# Patient Record
Sex: FEMALE | Race: WHITE | Employment: OTHER | URBAN - METROPOLITAN AREA
[De-identification: names, ages, dates, MRNs, and addresses within clinical notes are randomized per-mention and may not be internally consistent; named-entity substitution may affect disease eponyms.]

---

## 2017-03-09 ENCOUNTER — HOSPITAL ENCOUNTER (OUTPATIENT)
Dept: CT IMAGING | Age: 72
Discharge: HOME | End: 2017-03-09
Attending: SURGERY
Payer: MEDICARE

## 2017-03-09 ENCOUNTER — HOSPITAL ENCOUNTER (OUTPATIENT)
Dept: CT IMAGING | Age: 72
End: 2017-03-09
Attending: SURGERY
Payer: MEDICARE

## 2017-03-09 VITALS — HEIGHT: 63 IN | WEIGHT: 163 LBS | BODY MASS INDEX: 28.88 KG/M2

## 2017-03-09 DIAGNOSIS — I65.23 BILATERAL CAROTID ARTERY STENOSIS: ICD-10-CM

## 2017-03-09 LAB — CREAT BLD-MCNC: 1 MG/DL (ref 0.6–1)

## 2017-03-09 PROCEDURE — 74011636320 HC RX REV CODE- 636/320: Performed by: SURGERY

## 2017-03-09 PROCEDURE — 82565 ASSAY OF CREATININE: CPT

## 2017-03-09 PROCEDURE — 74011000258 HC RX REV CODE- 258: Performed by: SURGERY

## 2017-03-09 PROCEDURE — 70498 CT ANGIOGRAPHY NECK: CPT

## 2017-03-09 RX ORDER — SODIUM CHLORIDE 0.9 % (FLUSH) 0.9 %
10 SYRINGE (ML) INJECTION
Status: COMPLETED | OUTPATIENT
Start: 2017-03-09 | End: 2017-03-09

## 2017-03-09 RX ADMIN — Medication 10 ML: at 10:33

## 2017-03-09 RX ADMIN — IOVERSOL 80 ML: 741 INJECTION INTRA-ARTERIAL; INTRAVENOUS at 10:33

## 2017-03-09 RX ADMIN — SODIUM CHLORIDE 100 ML: 900 INJECTION, SOLUTION INTRAVENOUS at 10:33

## 2017-03-21 PROBLEM — I25.10 CORONARY ATHEROSCLEROSIS OF NATIVE CORONARY VESSEL: Status: ACTIVE | Noted: 2017-03-21

## 2017-03-21 PROBLEM — R01.1 HEART MURMUR: Status: ACTIVE | Noted: 2017-03-21

## 2017-03-21 PROBLEM — D50.9 IRON DEFICIENCY ANEMIA: Status: ACTIVE | Noted: 2017-03-21

## 2017-03-21 PROBLEM — I65.29 CAROTID ARTERY STENOSIS WITHOUT CEREBRAL INFARCTION: Status: ACTIVE | Noted: 2017-03-21

## 2017-03-23 ENCOUNTER — ANESTHESIA EVENT (OUTPATIENT)
Dept: SURGERY | Age: 72
DRG: 039 | End: 2017-03-23
Payer: MEDICARE

## 2017-03-23 ENCOUNTER — HOSPITAL ENCOUNTER (OUTPATIENT)
Dept: SURGERY | Age: 72
Discharge: HOME | End: 2017-03-23
Payer: MEDICARE

## 2017-03-23 VITALS
OXYGEN SATURATION: 96 % | RESPIRATION RATE: 16 BRPM | HEART RATE: 81 BPM | WEIGHT: 167.7 LBS | HEIGHT: 63 IN | SYSTOLIC BLOOD PRESSURE: 183 MMHG | TEMPERATURE: 97.4 F | BODY MASS INDEX: 29.71 KG/M2 | DIASTOLIC BLOOD PRESSURE: 82 MMHG

## 2017-03-23 LAB
ANION GAP BLD CALC-SCNC: 8 MMOL/L (ref 7–16)
BUN SERPL-MCNC: 26 MG/DL (ref 8–23)
CALCIUM SERPL-MCNC: 9.6 MG/DL (ref 8.3–10.4)
CHLORIDE SERPL-SCNC: 104 MMOL/L (ref 98–107)
CO2 SERPL-SCNC: 28 MMOL/L (ref 21–32)
CREAT SERPL-MCNC: 1.23 MG/DL (ref 0.6–1)
ERYTHROCYTE [DISTWIDTH] IN BLOOD BY AUTOMATED COUNT: 15.2 % (ref 11.9–14.6)
GLUCOSE SERPL-MCNC: 183 MG/DL (ref 65–100)
HCT VFR BLD AUTO: 36 % (ref 35.8–46.3)
HGB BLD-MCNC: 12.2 G/DL (ref 11.7–15.4)
MCH RBC QN AUTO: 29 PG (ref 26.1–32.9)
MCHC RBC AUTO-ENTMCNC: 33.9 G/DL (ref 31.4–35)
MCV RBC AUTO: 85.7 FL (ref 79.6–97.8)
PLATELET # BLD AUTO: 221 K/UL (ref 150–450)
PMV BLD AUTO: 10.2 FL (ref 10.8–14.1)
POTASSIUM SERPL-SCNC: 4.4 MMOL/L (ref 3.5–5.1)
RBC # BLD AUTO: 4.2 M/UL (ref 4.05–5.25)
SODIUM SERPL-SCNC: 140 MMOL/L (ref 136–145)
WBC # BLD AUTO: 5.2 K/UL (ref 4.3–11.1)

## 2017-03-23 PROCEDURE — 80048 BASIC METABOLIC PNL TOTAL CA: CPT | Performed by: SURGERY

## 2017-03-23 PROCEDURE — 85027 COMPLETE CBC AUTOMATED: CPT | Performed by: SURGERY

## 2017-03-23 RX ORDER — ACETAMINOPHEN 325 MG/1
325 TABLET ORAL AS NEEDED
COMMUNITY

## 2017-03-23 NOTE — PERIOP NOTES
Patient verified name, , and surgery as listed in [facility]. TYPE  CASE:2            Orders: received. Labs per surgeon:  CBC/BMP. T/S Order signed and held for PREOP. Labs per anesthesia protocol: None needed   EKG  :  EKG ON CHART FROM 3/23/17     [facility] IN TO ASSESS PT AND REVIEW DOCS (CAROTIDS 17, CT HEAD 3/9/17, ECHO 10/1/14, EKG 14, CARDIAC CLEARANCE 3/23/17). NO ORDERS GIVEN OTHER THAN TO FOLLOW UP W/ ECHO DR SHELLEY IS ORDERING. DR SHELLEY ORDERED ECHO FOR PT TO HAVE DONE PRIOR TO SX. ECHO TIME AND DATE NOT YET KNOWN, BUT ORDER IS NOTED IN [facility]. Chart marked and placed in chart room for charge RN to follow up. BLOOD CONSENT ON CHART     Pt medications obtained  PT MEMORY, med bottles NOT visualized. Requested pt to validate each medication, dose and frequency while here today. Instructed patient to continue previous medications as prescribed prior to surgery and  to take the following medications the day of surgery according to anesthesia guidelines with a small sip of water : ASA 81MG, NEXIUM       Continue all previous medications unless otherwise directed. Instructed patient to hold  the following medications prior to surgery: ALL VITAMINS/SUPPLEMENTS     INSTRUCTED PT TO CONTINUE ASA 81MG DAILY    Patient instructed on the following and verbalized understanding:  Arrive at MAIN entrance, time of arrival to be called the day before by 1700. Responsible adult must drive patient to and from hospital, stay during surgery and 24 hours postoperatively. Npo after midnight including gum, mints and ice chips. Use hibiclens in the shower the night before and the morning of surgery. Leave all valuables at home. Instructed on no jewelry or body piercings on the dos. Bring insurance card and ID. No perfumes, oil, powder, colognes, makeup or  lotions on the skin.     Patient verbalized understanding of all instructions and provided all medical/health information to the best of their ability.

## 2017-03-23 NOTE — ANESTHESIA PREPROCEDURE EVALUATION
Anesthetic History     PONV          Review of Systems / Medical History  Patient summary reviewed and pertinent labs reviewed    Pulmonary  Within defined limits                 Neuro/Psych   Within defined limits           Cardiovascular    Hypertension: well controlled  Valvular problems/murmurs        PAD and hyperlipidemia  Pertinent negatives: CAD: denies. Exercise tolerance: >4 METS  Comments: Pt has heart murmur. Dr. Charito Javier has ordered an ECHO prior to surgery and will need review.     GI/Hepatic/Renal     GERD: well controlled           Endo/Other        Arthritis and anemia     Other Findings            Physical Exam    Airway  Mallampati: II  TM Distance: 4 - 6 cm  Neck ROM: normal range of motion   Mouth opening: Normal     Cardiovascular  Regular rate and rhythm,  S1 and S2 normal,  no murmur, click, rub, or gallop  Rhythm: regular  Rate: normal         Dental    Dentition: Full upper dentures and Full lower dentures     Pulmonary  Breath sounds clear to auscultation               Abdominal  GI exam deferred       Other Findings            Anesthetic Plan    ASA: 3  Anesthesia type: general    Monitoring Plan: Arterial line      Induction: Intravenous

## 2017-03-23 NOTE — PERIOP NOTES
Recent Results (from the past 12 hour(s))   CBC W/O DIFF    Collection Time: 03/23/17  2:15 PM   Result Value Ref Range    WBC 5.2 4.3 - 11.1 K/uL    RBC 4.20 4.05 - 5.25 M/uL    HGB 12.2 11.7 - 15.4 g/dL    HCT 36.0 35.8 - 46.3 %    MCV 85.7 79.6 - 97.8 FL    MCH 29.0 26.1 - 32.9 PG    MCHC 33.9 31.4 - 35.0 g/dL    RDW 15.2 (H) 11.9 - 14.6 %    PLATELET 353 666 - 117 K/uL    MPV 10.2 (L) 10.8 - 14.1 FL    BMP \"IN PROCESS\". Chart marked and placed in chart room for charge RN to follow up.

## 2017-03-24 ENCOUNTER — HOSPITAL ENCOUNTER (OUTPATIENT)
Dept: NON INVASIVE DIAGNOSTICS | Age: 72
Discharge: HOME | End: 2017-03-24
Attending: INTERNAL MEDICINE
Payer: MEDICARE

## 2017-03-24 DIAGNOSIS — I65.23 CAROTID ARTERY STENOSIS WITHOUT CEREBRAL INFARCTION, BILATERAL: ICD-10-CM

## 2017-03-24 DIAGNOSIS — R01.1 HEART MURMUR: ICD-10-CM

## 2017-03-24 PROCEDURE — 93306 TTE W/DOPPLER COMPLETE: CPT

## 2017-03-24 NOTE — PERIOP NOTES
Recent Results (from the past 24 hour(s))   CBC W/O DIFF    Collection Time: 03/23/17  2:15 PM   Result Value Ref Range    WBC 5.2 4.3 - 11.1 K/uL    RBC 4.20 4.05 - 5.25 M/uL    HGB 12.2 11.7 - 15.4 g/dL    HCT 36.0 35.8 - 46.3 %    MCV 85.7 79.6 - 97.8 FL    MCH 29.0 26.1 - 32.9 PG    MCHC 33.9 31.4 - 35.0 g/dL    RDW 15.2 (H) 11.9 - 14.6 %    PLATELET 336 482 - 309 K/uL    MPV 10.2 (L) 10.8 - 97.5 FL   METABOLIC PANEL, BASIC    Collection Time: 03/23/17  2:15 PM   Result Value Ref Range    Sodium 140 136 - 145 mmol/L    Potassium 4.4 3.5 - 5.1 mmol/L    Chloride 104 98 - 107 mmol/L    CO2 28 21 - 32 mmol/L    Anion gap 8 7 - 16 mmol/L    Glucose 183 (H) 65 - 100 mg/dL    BUN 26 (H) 8 - 23 MG/DL    Creatinine 1.23 (H) 0.6 - 1.0 MG/DL    GFR est AA 55 (L) >60 ml/min/1.73m2    GFR est non-AA 46 (L) >60 ml/min/1.73m2    Calcium 9.6 8.3 - 10.4 MG/DL   Patient is not diagnosed as diabetic

## 2017-03-27 NOTE — PROGRESS NOTES
Please call patient. Echo with normal LV function and mild aortic stenosis. Low risk for surgery. Will follow aortic valve in office.   Thank you

## 2017-03-30 ENCOUNTER — HOSPITAL ENCOUNTER (INPATIENT)
Age: 72
LOS: 2 days | Discharge: HOME | DRG: 039 | End: 2017-04-01
Attending: SURGERY | Admitting: SURGERY
Payer: MEDICARE

## 2017-03-30 ENCOUNTER — ANESTHESIA (OUTPATIENT)
Dept: SURGERY | Age: 72
DRG: 039 | End: 2017-03-30
Payer: MEDICARE

## 2017-03-30 PROBLEM — I65.22 CAROTID STENOSIS, LEFT: Status: ACTIVE | Noted: 2017-03-30

## 2017-03-30 LAB
ABO + RH BLD: NORMAL
ANION GAP BLD CALC-SCNC: 12 MMOL/L (ref 7–16)
ATRIAL RATE: 74 BPM
BLOOD GROUP ANTIBODIES SERPL: NORMAL
BUN SERPL-MCNC: 17 MG/DL (ref 8–23)
CALCIUM SERPL-MCNC: 8.8 MG/DL (ref 8.3–10.4)
CALCULATED P AXIS, ECG09: 57 DEGREES
CALCULATED R AXIS, ECG10: 44 DEGREES
CALCULATED T AXIS, ECG11: 99 DEGREES
CHLORIDE SERPL-SCNC: 105 MMOL/L (ref 98–107)
CO2 SERPL-SCNC: 22 MMOL/L (ref 21–32)
CREAT SERPL-MCNC: 0.98 MG/DL (ref 0.6–1)
DIAGNOSIS, 93000: NORMAL
ERYTHROCYTE [DISTWIDTH] IN BLOOD BY AUTOMATED COUNT: 14.7 % (ref 11.9–14.6)
GLUCOSE SERPL-MCNC: 176 MG/DL (ref 65–100)
HCT VFR BLD AUTO: 28.9 % (ref 35.8–46.3)
HGB BLD-MCNC: 9.8 G/DL (ref 11.7–15.4)
MAGNESIUM SERPL-MCNC: 1.6 MG/DL (ref 1.8–2.4)
MCH RBC QN AUTO: 28.4 PG (ref 26.1–32.9)
MCHC RBC AUTO-ENTMCNC: 33.9 G/DL (ref 31.4–35)
MCV RBC AUTO: 83.8 FL (ref 79.6–97.8)
P-R INTERVAL, ECG05: 194 MS
PLATELET # BLD AUTO: 154 K/UL (ref 150–450)
PMV BLD AUTO: 9.6 FL (ref 10.8–14.1)
POTASSIUM SERPL-SCNC: 4 MMOL/L (ref 3.5–5.1)
Q-T INTERVAL, ECG07: 376 MS
QRS DURATION, ECG06: 66 MS
QTC CALCULATION (BEZET), ECG08: 417 MS
RBC # BLD AUTO: 3.45 M/UL (ref 4.05–5.25)
SODIUM SERPL-SCNC: 139 MMOL/L (ref 136–145)
SPECIMEN EXP DATE BLD: NORMAL
VENTRICULAR RATE, ECG03: 74 BPM
WBC # BLD AUTO: 4.5 K/UL (ref 4.3–11.1)

## 2017-03-30 PROCEDURE — 77030013794 HC KT TRNSDUC BLD EDWD -B: Performed by: ANESTHESIOLOGY

## 2017-03-30 PROCEDURE — 3E033GC INTRODUCTION OF OTHER THERAPEUTIC SUBSTANCE INTO PERIPHERAL VEIN, PERCUTANEOUS APPROACH: ICD-10-PCS | Performed by: SURGERY

## 2017-03-30 PROCEDURE — 93005 ELECTROCARDIOGRAM TRACING: CPT | Performed by: SURGERY

## 2017-03-30 PROCEDURE — 77030020407 HC IV BLD WRMR ST 3M -A: Performed by: ANESTHESIOLOGY

## 2017-03-30 PROCEDURE — 74011250636 HC RX REV CODE- 250/636: Performed by: ANESTHESIOLOGY

## 2017-03-30 PROCEDURE — 74011000250 HC RX REV CODE- 250: Performed by: SURGERY

## 2017-03-30 PROCEDURE — 77030031139 HC SUT VCRL2 J&J -A: Performed by: SURGERY

## 2017-03-30 PROCEDURE — 77030002986 HC SUT PROL J&J -A: Performed by: SURGERY

## 2017-03-30 PROCEDURE — 80048 BASIC METABOLIC PNL TOTAL CA: CPT | Performed by: SURGERY

## 2017-03-30 PROCEDURE — 76010000173 HC OR TIME 3 TO 3.5 HR INTENSV-TIER 1: Performed by: SURGERY

## 2017-03-30 PROCEDURE — 76060000037 HC ANESTHESIA 3 TO 3.5 HR: Performed by: SURGERY

## 2017-03-30 PROCEDURE — 77030008477 HC STYL SATN SLP COVD -A: Performed by: ANESTHESIOLOGY

## 2017-03-30 PROCEDURE — 03CJ0ZZ EXTIRPATION OF MATTER FROM LEFT COMMON CAROTID ARTERY, OPEN APPROACH: ICD-10-PCS | Performed by: SURGERY

## 2017-03-30 PROCEDURE — C1768 GRAFT, VASCULAR: HCPCS | Performed by: SURGERY

## 2017-03-30 PROCEDURE — 77030002933 HC SUT MCRYL J&J -A: Performed by: SURGERY

## 2017-03-30 PROCEDURE — 77030014008 HC SPNG HEMSTAT J&J -C: Performed by: SURGERY

## 2017-03-30 PROCEDURE — 65610000006 HC RM INTENSIVE CARE

## 2017-03-30 PROCEDURE — 36600 WITHDRAWAL OF ARTERIAL BLOOD: CPT

## 2017-03-30 PROCEDURE — 77030010507 HC ADH SKN DERMBND J&J -B: Performed by: SURGERY

## 2017-03-30 PROCEDURE — 77030032490 HC SLV COMPR SCD KNE COVD -B: Performed by: SURGERY

## 2017-03-30 PROCEDURE — 86900 BLOOD TYPING SEROLOGIC ABO: CPT | Performed by: SURGERY

## 2017-03-30 PROCEDURE — 74011250636 HC RX REV CODE- 250/636: Performed by: SURGERY

## 2017-03-30 PROCEDURE — 77030002987 HC SUT PROL J&J -B: Performed by: SURGERY

## 2017-03-30 PROCEDURE — 77030018824 HC SHNT CAR ARGY COVD -B: Performed by: SURGERY

## 2017-03-30 PROCEDURE — 74011250637 HC RX REV CODE- 250/637: Performed by: SURGERY

## 2017-03-30 PROCEDURE — 77030012890

## 2017-03-30 PROCEDURE — 83735 ASSAY OF MAGNESIUM: CPT | Performed by: SURGERY

## 2017-03-30 PROCEDURE — 77030034888 HC SUT PROL 2 J&J -B: Performed by: SURGERY

## 2017-03-30 PROCEDURE — 77030019908 HC STETH ESOPH SIMS -A: Performed by: ANESTHESIOLOGY

## 2017-03-30 PROCEDURE — 03UN0KZ SUPPLEMENT LEFT EXTERNAL CAROTID ARTERY WITH NONAUTOLOGOUS TISSUE SUBSTITUTE, OPEN APPROACH: ICD-10-PCS | Performed by: SURGERY

## 2017-03-30 PROCEDURE — 85027 COMPLETE CBC AUTOMATED: CPT | Performed by: SURGERY

## 2017-03-30 PROCEDURE — 74011250637 HC RX REV CODE- 250/637: Performed by: ANESTHESIOLOGY

## 2017-03-30 PROCEDURE — 03CN0ZZ EXTIRPATION OF MATTER FROM LEFT EXTERNAL CAROTID ARTERY, OPEN APPROACH: ICD-10-PCS | Performed by: SURGERY

## 2017-03-30 PROCEDURE — 77030018836 HC SOL IRR NACL ICUM -A: Performed by: SURGERY

## 2017-03-30 PROCEDURE — 77030002996 HC SUT SLK J&J -A: Performed by: SURGERY

## 2017-03-30 PROCEDURE — 77030018673: Performed by: SURGERY

## 2017-03-30 PROCEDURE — 74011250636 HC RX REV CODE- 250/636

## 2017-03-30 PROCEDURE — 77030005401 HC CATH RAD ARRO -A: Performed by: ANESTHESIOLOGY

## 2017-03-30 PROCEDURE — 77030011640 HC PAD GRND REM COVD -A: Performed by: SURGERY

## 2017-03-30 PROCEDURE — 74011000250 HC RX REV CODE- 250

## 2017-03-30 PROCEDURE — 77030034850: Performed by: SURGERY

## 2017-03-30 PROCEDURE — 77030020782 HC GWN BAIR PAWS FLX 3M -B: Performed by: ANESTHESIOLOGY

## 2017-03-30 PROCEDURE — 77030013292 HC BOWL MX PRSM J&J -A: Performed by: ANESTHESIOLOGY

## 2017-03-30 PROCEDURE — 03UJ0KZ SUPPLEMENT LEFT COMMON CAROTID ARTERY WITH NONAUTOLOGOUS TISSUE SUBSTITUTE, OPEN APPROACH: ICD-10-PCS | Performed by: SURGERY

## 2017-03-30 PROCEDURE — 77030008703 HC TU ET UNCUF COVD -A: Performed by: ANESTHESIOLOGY

## 2017-03-30 PROCEDURE — 77030010514 HC APPL CLP LIG COVD -B: Performed by: SURGERY

## 2017-03-30 DEVICE — XENOSURE BIOLOGIC PATCH, 0.8CM X 8CM, EIFU
Type: IMPLANTABLE DEVICE | Site: CAROTID | Status: FUNCTIONAL
Brand: XENOSURE BIOLOGIC PATCH

## 2017-03-30 RX ORDER — SODIUM CHLORIDE 0.9 % (FLUSH) 0.9 %
5-10 SYRINGE (ML) INJECTION AS NEEDED
Status: DISCONTINUED | OUTPATIENT
Start: 2017-03-30 | End: 2017-04-01 | Stop reason: HOSPADM

## 2017-03-30 RX ORDER — DEXAMETHASONE SODIUM PHOSPHATE 4 MG/ML
INJECTION, SOLUTION INTRA-ARTICULAR; INTRALESIONAL; INTRAMUSCULAR; INTRAVENOUS; SOFT TISSUE AS NEEDED
Status: DISCONTINUED | OUTPATIENT
Start: 2017-03-30 | End: 2017-03-30 | Stop reason: HOSPADM

## 2017-03-30 RX ORDER — HYDROCODONE BITARTRATE AND ACETAMINOPHEN 7.5; 325 MG/1; MG/1
1 TABLET ORAL AS NEEDED
Status: CANCELLED | OUTPATIENT
Start: 2017-03-30

## 2017-03-30 RX ORDER — SODIUM CHLORIDE, SODIUM LACTATE, POTASSIUM CHLORIDE, CALCIUM CHLORIDE 600; 310; 30; 20 MG/100ML; MG/100ML; MG/100ML; MG/100ML
INJECTION, SOLUTION INTRAVENOUS
Status: DISCONTINUED | OUTPATIENT
Start: 2017-03-30 | End: 2017-03-30 | Stop reason: HOSPADM

## 2017-03-30 RX ORDER — CEFAZOLIN SODIUM IN 0.9 % NACL 2 G/50 ML
2 INTRAVENOUS SOLUTION, PIGGYBACK (ML) INTRAVENOUS EVERY 8 HOURS
Status: DISCONTINUED | OUTPATIENT
Start: 2017-03-30 | End: 2017-03-30

## 2017-03-30 RX ORDER — SODIUM CHLORIDE 0.9 % (FLUSH) 0.9 %
5-10 SYRINGE (ML) INJECTION EVERY 8 HOURS
Status: DISCONTINUED | OUTPATIENT
Start: 2017-03-30 | End: 2017-04-01 | Stop reason: HOSPADM

## 2017-03-30 RX ORDER — OXYCODONE AND ACETAMINOPHEN 7.5; 325 MG/1; MG/1
1 TABLET ORAL
Status: DISCONTINUED | OUTPATIENT
Start: 2017-03-30 | End: 2017-04-01 | Stop reason: HOSPADM

## 2017-03-30 RX ORDER — NICARDIPINE HYDROCHLORIDE 0.1 MG/ML
5-15 INJECTION INTRAVENOUS
Status: DISCONTINUED | OUTPATIENT
Start: 2017-03-30 | End: 2017-03-30

## 2017-03-30 RX ORDER — NEOSTIGMINE METHYLSULFATE 1 MG/ML
INJECTION INTRAVENOUS AS NEEDED
Status: DISCONTINUED | OUTPATIENT
Start: 2017-03-30 | End: 2017-03-30 | Stop reason: HOSPADM

## 2017-03-30 RX ORDER — ROCURONIUM BROMIDE 10 MG/ML
INJECTION, SOLUTION INTRAVENOUS AS NEEDED
Status: DISCONTINUED | OUTPATIENT
Start: 2017-03-30 | End: 2017-03-30 | Stop reason: HOSPADM

## 2017-03-30 RX ORDER — PROPOFOL 10 MG/ML
INJECTION, EMULSION INTRAVENOUS AS NEEDED
Status: DISCONTINUED | OUTPATIENT
Start: 2017-03-30 | End: 2017-03-30 | Stop reason: HOSPADM

## 2017-03-30 RX ORDER — LISINOPRIL AND HYDROCHLOROTHIAZIDE 12.5; 2 MG/1; MG/1
1 TABLET ORAL
Status: DISCONTINUED | OUTPATIENT
Start: 2017-03-31 | End: 2017-04-01 | Stop reason: HOSPADM

## 2017-03-30 RX ORDER — SODIUM CHLORIDE 9 MG/ML
25 INJECTION, SOLUTION INTRAVENOUS CONTINUOUS
Status: DISCONTINUED | OUTPATIENT
Start: 2017-03-30 | End: 2017-03-30 | Stop reason: HOSPADM

## 2017-03-30 RX ORDER — SODIUM CHLORIDE 0.9 % (FLUSH) 0.9 %
5-10 SYRINGE (ML) INJECTION AS NEEDED
Status: DISCONTINUED | OUTPATIENT
Start: 2017-03-30 | End: 2017-03-30 | Stop reason: HOSPADM

## 2017-03-30 RX ORDER — OXYCODONE HYDROCHLORIDE 5 MG/1
5 TABLET ORAL
Status: CANCELLED | OUTPATIENT
Start: 2017-03-30

## 2017-03-30 RX ORDER — HYDROMORPHONE HYDROCHLORIDE 2 MG/ML
0.5 INJECTION, SOLUTION INTRAMUSCULAR; INTRAVENOUS; SUBCUTANEOUS
Status: CANCELLED | OUTPATIENT
Start: 2017-03-30

## 2017-03-30 RX ORDER — MIDAZOLAM HYDROCHLORIDE 1 MG/ML
2 INJECTION, SOLUTION INTRAMUSCULAR; INTRAVENOUS
Status: DISCONTINUED | OUTPATIENT
Start: 2017-03-30 | End: 2017-03-30 | Stop reason: HOSPADM

## 2017-03-30 RX ORDER — CEFAZOLIN SODIUM IN 0.9 % NACL 2 G/50 ML
2 INTRAVENOUS SOLUTION, PIGGYBACK (ML) INTRAVENOUS EVERY 8 HOURS
Status: COMPLETED | OUTPATIENT
Start: 2017-03-30 | End: 2017-03-31

## 2017-03-30 RX ORDER — DIPHENHYDRAMINE HCL 25 MG
50 CAPSULE ORAL
Status: DISCONTINUED | OUTPATIENT
Start: 2017-03-30 | End: 2017-04-01 | Stop reason: HOSPADM

## 2017-03-30 RX ORDER — SODIUM CHLORIDE, SODIUM LACTATE, POTASSIUM CHLORIDE, CALCIUM CHLORIDE 600; 310; 30; 20 MG/100ML; MG/100ML; MG/100ML; MG/100ML
100 INJECTION, SOLUTION INTRAVENOUS CONTINUOUS
Status: DISCONTINUED | OUTPATIENT
Start: 2017-03-30 | End: 2017-03-30 | Stop reason: HOSPADM

## 2017-03-30 RX ORDER — SODIUM CHLORIDE 0.9 % (FLUSH) 0.9 %
5-10 SYRINGE (ML) INJECTION EVERY 8 HOURS
Status: DISCONTINUED | OUTPATIENT
Start: 2017-03-30 | End: 2017-03-30 | Stop reason: HOSPADM

## 2017-03-30 RX ORDER — BUPIVACAINE HYDROCHLORIDE 2.5 MG/ML
INJECTION, SOLUTION EPIDURAL; INFILTRATION; INTRACAUDAL AS NEEDED
Status: DISCONTINUED | OUTPATIENT
Start: 2017-03-30 | End: 2017-03-30 | Stop reason: HOSPADM

## 2017-03-30 RX ORDER — HYDROMORPHONE HYDROCHLORIDE 1 MG/ML
0.5 INJECTION, SOLUTION INTRAMUSCULAR; INTRAVENOUS; SUBCUTANEOUS
Status: DISCONTINUED | OUTPATIENT
Start: 2017-03-30 | End: 2017-03-31

## 2017-03-30 RX ORDER — NALOXONE HYDROCHLORIDE 0.4 MG/ML
0.4 INJECTION, SOLUTION INTRAMUSCULAR; INTRAVENOUS; SUBCUTANEOUS AS NEEDED
Status: DISCONTINUED | OUTPATIENT
Start: 2017-03-30 | End: 2017-04-01 | Stop reason: HOSPADM

## 2017-03-30 RX ORDER — SODIUM CHLORIDE 0.9 % (FLUSH) 0.9 %
5-10 SYRINGE (ML) INJECTION AS NEEDED
Status: CANCELLED | OUTPATIENT
Start: 2017-03-30

## 2017-03-30 RX ORDER — ACETAMINOPHEN 325 MG/1
325 TABLET ORAL
Status: DISCONTINUED | OUTPATIENT
Start: 2017-03-30 | End: 2017-04-01 | Stop reason: HOSPADM

## 2017-03-30 RX ORDER — LIDOCAINE HYDROCHLORIDE 10 MG/ML
INJECTION INFILTRATION; PERINEURAL AS NEEDED
Status: DISCONTINUED | OUTPATIENT
Start: 2017-03-30 | End: 2017-03-30 | Stop reason: HOSPADM

## 2017-03-30 RX ORDER — ONDANSETRON 2 MG/ML
INJECTION INTRAMUSCULAR; INTRAVENOUS AS NEEDED
Status: DISCONTINUED | OUTPATIENT
Start: 2017-03-30 | End: 2017-03-30 | Stop reason: HOSPADM

## 2017-03-30 RX ORDER — GLYCOPYRROLATE 0.2 MG/ML
INJECTION INTRAMUSCULAR; INTRAVENOUS AS NEEDED
Status: DISCONTINUED | OUTPATIENT
Start: 2017-03-30 | End: 2017-03-30 | Stop reason: HOSPADM

## 2017-03-30 RX ORDER — MAGNESIUM SULFATE 1 G/100ML
1 INJECTION INTRAVENOUS AS NEEDED
Status: DISCONTINUED | OUTPATIENT
Start: 2017-03-30 | End: 2017-04-01 | Stop reason: HOSPADM

## 2017-03-30 RX ORDER — DEXTROSE, SODIUM CHLORIDE, AND POTASSIUM CHLORIDE 5; .45; .15 G/100ML; G/100ML; G/100ML
125 INJECTION INTRAVENOUS CONTINUOUS
Status: DISCONTINUED | OUTPATIENT
Start: 2017-03-30 | End: 2017-03-31

## 2017-03-30 RX ORDER — ONDANSETRON 2 MG/ML
4 INJECTION INTRAMUSCULAR; INTRAVENOUS
Status: DISCONTINUED | OUTPATIENT
Start: 2017-03-30 | End: 2017-04-01 | Stop reason: HOSPADM

## 2017-03-30 RX ORDER — FAMOTIDINE 20 MG/1
20 TABLET, FILM COATED ORAL ONCE
Status: COMPLETED | OUTPATIENT
Start: 2017-03-30 | End: 2017-03-30

## 2017-03-30 RX ORDER — PANTOPRAZOLE SODIUM 40 MG/1
40 TABLET, DELAYED RELEASE ORAL
Status: DISCONTINUED | OUTPATIENT
Start: 2017-03-31 | End: 2017-04-01 | Stop reason: HOSPADM

## 2017-03-30 RX ORDER — HEPARIN SODIUM 5000 [USP'U]/ML
INJECTION, SOLUTION INTRAVENOUS; SUBCUTANEOUS AS NEEDED
Status: DISCONTINUED | OUTPATIENT
Start: 2017-03-30 | End: 2017-03-30 | Stop reason: HOSPADM

## 2017-03-30 RX ORDER — ESMOLOL HYDROCHLORIDE 10 MG/ML
INJECTION INTRAVENOUS AS NEEDED
Status: DISCONTINUED | OUTPATIENT
Start: 2017-03-30 | End: 2017-03-30 | Stop reason: HOSPADM

## 2017-03-30 RX ORDER — CEFAZOLIN SODIUM IN 0.9 % NACL 2 G/50 ML
2 INTRAVENOUS SOLUTION, PIGGYBACK (ML) INTRAVENOUS ONCE
Status: COMPLETED | OUTPATIENT
Start: 2017-03-30 | End: 2017-03-30

## 2017-03-30 RX ORDER — FENTANYL CITRATE 50 UG/ML
100 INJECTION, SOLUTION INTRAMUSCULAR; INTRAVENOUS ONCE
Status: DISCONTINUED | OUTPATIENT
Start: 2017-03-30 | End: 2017-03-30 | Stop reason: HOSPADM

## 2017-03-30 RX ORDER — ASPIRIN 81 MG/1
81 TABLET ORAL
Status: DISCONTINUED | OUTPATIENT
Start: 2017-03-31 | End: 2017-03-30

## 2017-03-30 RX ORDER — NALOXONE HYDROCHLORIDE 0.4 MG/ML
0.1 INJECTION, SOLUTION INTRAMUSCULAR; INTRAVENOUS; SUBCUTANEOUS AS NEEDED
Status: CANCELLED | OUTPATIENT
Start: 2017-03-30

## 2017-03-30 RX ORDER — PROTAMINE SULFATE 10 MG/ML
INJECTION, SOLUTION INTRAVENOUS AS NEEDED
Status: DISCONTINUED | OUTPATIENT
Start: 2017-03-30 | End: 2017-03-30 | Stop reason: HOSPADM

## 2017-03-30 RX ORDER — FENTANYL CITRATE 50 UG/ML
INJECTION, SOLUTION INTRAMUSCULAR; INTRAVENOUS AS NEEDED
Status: DISCONTINUED | OUTPATIENT
Start: 2017-03-30 | End: 2017-03-30 | Stop reason: HOSPADM

## 2017-03-30 RX ORDER — LIDOCAINE HYDROCHLORIDE 10 MG/ML
0.1 INJECTION INFILTRATION; PERINEURAL AS NEEDED
Status: DISCONTINUED | OUTPATIENT
Start: 2017-03-30 | End: 2017-03-30 | Stop reason: HOSPADM

## 2017-03-30 RX ORDER — SIMVASTATIN 20 MG/1
20 TABLET, FILM COATED ORAL
Status: DISCONTINUED | OUTPATIENT
Start: 2017-03-30 | End: 2017-04-01 | Stop reason: HOSPADM

## 2017-03-30 RX ORDER — LIDOCAINE HYDROCHLORIDE 20 MG/ML
INJECTION, SOLUTION EPIDURAL; INFILTRATION; INTRACAUDAL; PERINEURAL AS NEEDED
Status: DISCONTINUED | OUTPATIENT
Start: 2017-03-30 | End: 2017-03-30 | Stop reason: HOSPADM

## 2017-03-30 RX ORDER — ASPIRIN 81 MG/1
81 TABLET ORAL DAILY
Status: DISCONTINUED | OUTPATIENT
Start: 2017-03-31 | End: 2017-04-01 | Stop reason: HOSPADM

## 2017-03-30 RX ADMIN — ONDANSETRON 4 MG: 2 INJECTION INTRAMUSCULAR; INTRAVENOUS at 17:53

## 2017-03-30 RX ADMIN — HEPARIN SODIUM 7400 UNITS: 5000 INJECTION, SOLUTION INTRAVENOUS; SUBCUTANEOUS at 11:29

## 2017-03-30 RX ADMIN — DEXTROSE MONOHYDRATE, SODIUM CHLORIDE, AND POTASSIUM CHLORIDE 125 ML/HR: 50; 4.5; 1.49 INJECTION, SOLUTION INTRAVENOUS at 13:59

## 2017-03-30 RX ADMIN — Medication 10 ML: at 14:00

## 2017-03-30 RX ADMIN — DEXAMETHASONE SODIUM PHOSPHATE 6 MG: 4 INJECTION, SOLUTION INTRA-ARTICULAR; INTRALESIONAL; INTRAMUSCULAR; INTRAVENOUS; SOFT TISSUE at 11:15

## 2017-03-30 RX ADMIN — SODIUM CHLORIDE, SODIUM LACTATE, POTASSIUM CHLORIDE, AND CALCIUM CHLORIDE 100 ML/HR: 600; 310; 30; 20 INJECTION, SOLUTION INTRAVENOUS at 08:38

## 2017-03-30 RX ADMIN — SODIUM CHLORIDE, SODIUM LACTATE, POTASSIUM CHLORIDE, CALCIUM CHLORIDE: 600; 310; 30; 20 INJECTION, SOLUTION INTRAVENOUS at 11:55

## 2017-03-30 RX ADMIN — MIDAZOLAM HYDROCHLORIDE 2 MG: 1 INJECTION, SOLUTION INTRAMUSCULAR; INTRAVENOUS at 09:59

## 2017-03-30 RX ADMIN — GLYCOPYRROLATE 0.4 MG: 0.2 INJECTION INTRAMUSCULAR; INTRAVENOUS at 12:45

## 2017-03-30 RX ADMIN — ONDANSETRON 4 MG: 2 INJECTION INTRAMUSCULAR; INTRAVENOUS at 22:03

## 2017-03-30 RX ADMIN — CEFAZOLIN 2 G: 1 INJECTION, POWDER, FOR SOLUTION INTRAMUSCULAR; INTRAVENOUS; PARENTERAL at 10:45

## 2017-03-30 RX ADMIN — FENTANYL CITRATE 50 MCG: 50 INJECTION, SOLUTION INTRAMUSCULAR; INTRAVENOUS at 11:10

## 2017-03-30 RX ADMIN — SODIUM CHLORIDE, SODIUM LACTATE, POTASSIUM CHLORIDE, CALCIUM CHLORIDE: 600; 310; 30; 20 INJECTION, SOLUTION INTRAVENOUS at 10:37

## 2017-03-30 RX ADMIN — PROTAMINE SULFATE 40 MG: 10 INJECTION, SOLUTION INTRAVENOUS at 12:27

## 2017-03-30 RX ADMIN — LIDOCAINE HYDROCHLORIDE 60 MG: 20 INJECTION, SOLUTION EPIDURAL; INFILTRATION; INTRACAUDAL; PERINEURAL at 10:34

## 2017-03-30 RX ADMIN — CEFAZOLIN 2 G: 1 INJECTION, POWDER, FOR SOLUTION INTRAMUSCULAR; INTRAVENOUS; PARENTERAL at 19:53

## 2017-03-30 RX ADMIN — MAGNESIUM SULFATE HEPTAHYDRATE 1 G: 1 INJECTION, SOLUTION INTRAVENOUS at 15:03

## 2017-03-30 RX ADMIN — ESMOLOL HYDROCHLORIDE 30 MG: 10 INJECTION INTRAVENOUS at 12:51

## 2017-03-30 RX ADMIN — SODIUM CHLORIDE, SODIUM LACTATE, POTASSIUM CHLORIDE, AND CALCIUM CHLORIDE: 600; 310; 30; 20 INJECTION, SOLUTION INTRAVENOUS at 11:32

## 2017-03-30 RX ADMIN — ROCURONIUM BROMIDE 40 MG: 10 INJECTION, SOLUTION INTRAVENOUS at 10:34

## 2017-03-30 RX ADMIN — FAMOTIDINE 20 MG: 20 TABLET, FILM COATED ORAL at 08:38

## 2017-03-30 RX ADMIN — HYDROMORPHONE HYDROCHLORIDE 0.5 MG: 1 INJECTION, SOLUTION INTRAMUSCULAR; INTRAVENOUS; SUBCUTANEOUS at 14:26

## 2017-03-30 RX ADMIN — OXYCODONE HYDROCHLORIDE AND ACETAMINOPHEN 1 TABLET: 7.5; 325 TABLET ORAL at 22:56

## 2017-03-30 RX ADMIN — NEOSTIGMINE METHYLSULFATE 3 MG: 1 INJECTION INTRAVENOUS at 12:45

## 2017-03-30 RX ADMIN — FENTANYL CITRATE 50 MCG: 50 INJECTION, SOLUTION INTRAMUSCULAR; INTRAVENOUS at 10:25

## 2017-03-30 RX ADMIN — ESMOLOL HYDROCHLORIDE 30 MG: 10 INJECTION INTRAVENOUS at 12:55

## 2017-03-30 RX ADMIN — ROCURONIUM BROMIDE 10 MG: 10 INJECTION, SOLUTION INTRAVENOUS at 11:31

## 2017-03-30 RX ADMIN — ONDANSETRON 4 MG: 2 INJECTION INTRAMUSCULAR; INTRAVENOUS at 12:23

## 2017-03-30 RX ADMIN — PROPOFOL 160 MG: 10 INJECTION, EMULSION INTRAVENOUS at 10:34

## 2017-03-30 NOTE — PERIOP NOTES
TRANSFER - OUT REPORT:    Verbal report given to Cam RN on [employer]  being transferred to CVU for routine progression of care       Report consisted of patients Situation, Background, Assessment and   Recommendations(SBAR). Information from the following report(s) OR Summary was reviewed with the receiving nurse. Lines:   Peripheral IV 03/30/17 Right Wrist (Active)   Site Assessment Clean, dry, & intact 3/30/2017  8:39 AM   Phlebitis Assessment 0 3/30/2017  8:39 AM   Infiltration Assessment 0 3/30/2017  8:39 AM   Dressing Status Clean, dry, & intact 3/30/2017  8:39 AM   Dressing Type Tape;Transparent 3/30/2017  8:39 AM   Hub Color/Line Status Infusing 3/30/2017  8:39 AM       Peripheral IV 03/30/17 Left Forearm (Active)       Arterial Line 03/30/17 Left Radial artery (Active)        Opportunity for questions and clarification was provided.       Patient transported with:   Monitor  O2 @ 8 liters  Registered Nurse X2; CRNA and Novalux

## 2017-03-30 NOTE — PROGRESS NOTES
TRANSFER - IN REPORT:    Verbal report received from CRNA on [facility]  being received from OR for routine post - op      Report consisted of patients Situation, Background, Assessment and   Recommendations(SBAR). Information from the following report(s) SBAR, Kardex, OR Summary, Procedure Summary, Intake/Output, MAR, Accordion, Recent Results, Med Rec Status, Cardiac Rhythm NSR and Alarm Parameters  was reviewed with the receiving nurse. Opportunity for questions and clarification was provided. Assessment completed upon patients arrival to unit and care assumed.

## 2017-03-30 NOTE — ANESTHESIA POSTPROCEDURE EVALUATION
Post-Anesthesia Evaluation and Assessment    Patient: Julia Hardy MRN: [id number]  SSN: [id number]    YOB: 1945  Age: 66 y.o. Sex: female       Cardiovascular Function/Vital Signs  Visit Vitals    /50    Pulse 70    Temp 36.2 °C (97.1 °F)    Resp 18    Ht 5' 3\" (1.6 m)    Wt 74.3 kg (163 lb 12 oz)    SpO2 100%    BMI 29.01 kg/m2       Patient is status post general anesthesia for Procedure(s):  LEFT CAROTID ARTERY ENDARTERECTOMY. Nausea/Vomiting: None    Postoperative hydration reviewed and adequate. Pain:  Pain Scale 1: Numeric (0 - 10) (03/30/17 1426)  Pain Intensity 1: 7 (03/30/17 1426)   Managed    Neurological Status:   Neuro (WDL): Within Defined Limits (03/30/17 0846)  Neuro  Neurologic State: Alert;Drowsy (03/30/17 1326)  Orientation Level: Oriented X4 (03/30/17 1326)  Cognition: Follows commands (03/30/17 1326)  Speech: Clear (03/30/17 1326)  Assessment L Pupil: Brisk;Round (03/30/17 1326)  Size L Pupil (mm): 3 (03/30/17 1326)  Assessment R Pupil: Brisk;Round (03/30/17 1326)  Size R Pupil (mm): 3 (03/30/17 1326)  LUE Motor Response: Purposeful;Weak (03/30/17 1326)  LLE Motor Response: Purposeful;Weak (03/30/17 1326)  RUE Motor Response: Purposeful;Weak (03/30/17 1326)  RLE Motor Response: Purposeful;Weak (03/30/17 1326)   At baseline    Mental Status and Level of Consciousness: Arousable    Pulmonary Status:   O2 Device: Nasal cannula (03/30/17 1325)   Adequate oxygenation and airway patent    Complications related to anesthesia: None    Post-anesthesia assessment completed.  No concerns- post-op EKG unchanged from prior    Signed By: Maynor Pardo MD     March 30, 2017

## 2017-03-30 NOTE — IP AVS SNAPSHOT
Jennifer Kelly 
 
 
 [address] 
[phone] Patient: Audelia Zepeda MRN: [id number] QRI:2[birth year]03 You are allergic to the following No active allergies Recent Documentation Height Weight BMI OB Status Smoking Status 1.6 m 74.3 kg 29.01 kg/m2 Hysterectomy Former Smoker Emergency Contacts Name Discharge Info Relation Home Work Mobile Leonarda Burleson  Spouse [3] [phone] Indu Lenz  Child [2] [phone] About your hospitalization You were admitted on:  March 30, 2017 You last received care in the:  [facility] 7 MED SURG You were discharged on:  April 1, 2017 Unit phone number:  [phone] Why you were hospitalized Your primary diagnosis was:  Carotid Stenosis, Left Your diagnoses also included:  Hypertension, Post-Operative Nausea And Vomiting Providers Seen During Your Hospitalizations Provider Role Specialty Primary office phone Denise Huddleston MD Attending Provider Vascular Surgery [phone] Your Primary Care Physician (PCP) Primary Care Physician Office Phone Office Fax [facility] [phone] Follow-up Information Follow up With Details Comments Contact Info Tj Roland MD   [address] [place] [address] [phone] Your Appointments Tuesday April 18, 2017 11:15 AM EDT Global Post Op with Denise Huddleston MD  
[facility] ([facility] [address] [place] [id number] [phone] Current Discharge Medication List  
  
START taking these medications Dose & Instructions Dispensing Information Comments Morning Noon Evening Bedtime  
 guaiFENesin  mg ER tablet Commonly known as:  Baljinder & Baljinder Your last dose was: Your next dose is: Over the counter Mucinex, take to keep secretions thin Quantity:  30 Tab Refills:  0  
     
   
   
   
  
 ondansetron 4 mg disintegrating tablet Commonly known as:  ZOFRAN ODT Your last dose was: Your next dose is:    
   
   
 Dose:  4 mg Take 1 Tab by mouth every eight (8) hours as needed for Nausea. Quantity:  30 Tab Refills:  0 CONTINUE these medications which have NOT CHANGED Dose & Instructions Dispensing Information Comments Morning Noon Evening Bedtime Aspirin EC 81 mg tablet Generic drug:  aspirin delayed-release Your last dose was: Your next dose is:    
   
   
 Dose:  81 mg Take 81 mg by mouth every morning. Refills:  0  
     
   
   
   
  
 lisinopril-hydroCHLOROthiazide 20-12.5 mg per tablet Commonly known as:  Wynetta Nose Your last dose was: Your next dose is:    
   
   
 Dose:  1 Tab Take 1 Tab by mouth every morning. Refills:  0 NexIUM 40 mg capsule Generic drug:  esomeprazole Your last dose was: Your next dose is:    
   
   
 Dose:  20 mg Take 20 mg by mouth every morning. Refills:  0  
     
   
   
   
  
 simvastatin 20 mg tablet Commonly known as:  ZOCOR Your last dose was: Your next dose is:    
   
   
 Dose:  20 mg Take 1 Tab by mouth nightly. Quantity:  90 Tab Refills:  0  
     
   
   
   
  
 TYLENOL 325 mg tablet Generic drug:  acetaminophen Your last dose was: Your next dose is:    
   
   
 Dose:  325 mg Take 325 mg by mouth as needed for Pain. Refills:  0  
     
   
   
   
  
 VITAMIN C PO Your last dose was: Your next dose is:    
   
   
 Dose:  1 Tab Take 1 Tab by mouth. Couple of times a week Refills:  0  
     
   
   
   
  
 VITAMIN D3 2,000 unit Tab Generic drug:  cholecalciferol (vitamin D3) Your last dose was: Your next dose is:    
   
   
 Dose:  2000 Units Take 2,000 Units by mouth. Couple times a week Refills:  0  
     
   
   
   
  
 vitamin E 400 unit capsule Commonly known as:  [address] Your last dose was: Your next dose is:    
   
   
 Dose:  400 Units Take 400 Units by mouth. Couple times a week Refills:  0 Where to Get Your Medications Information on where to get these meds will be given to you by the nurse or doctor. ! Ask your nurse or doctor about these medications  
  guaiFENesin  mg ER tablet  
 ondansetron 4 mg disintegrating tablet Discharge Instructions Call Dr. Claudean Fries for fever, chills, bleeding or other concerns  [phone] No heavy lifting greater than 5lbs for 6 weeks Shower ok, no tub bath, driving or swimming for 2 weeks Discharge Orders None ACO Transitions of Care Introducing [facility] [address] Silvia Verma offers a voluntary care coordination program to provide high quality service and care to [facility] fee-for-service beneficiaries. Sathya Casas was designed to help you enhance your health and well-being through the following services: ? Transitions of Care  support for individuals who are transitioning from one care setting to another (example: Hospital to home). ? Chronic and Complex Care Coordination  support for individuals and caregivers of those with serious or chronic illnesses or with more than one chronic (ongoing) condition and those who take a number of different medications. If you meet specific medical criteria, a [address] [place] [address] Rd may call you directly to coordinate your care with your primary care physician and your other care providers. For questions about the [facility] programs, please, contact your physicians office. For general questions or additional information about Accountable Care Organizations: 
Please visit www.medicare.gov/acos. html or call 1-800-MEDICARE (0-373.734.5026) TTY users should call 0-286.440.5229. [employer] Announcement We are excited to announce that we are making your provider's discharge notes available to you in [employer]. You will see these notes when they are completed and signed by the physician that discharged you from your recent hospital stay. If you have any questions or concerns about any information you see in Inf[employer], please call the Health Information Department where you were seen or reach out to your Primary Care Provider for more information about your plan of care. General Information Please provide this summary of care documentation to your next provider. Patient Signature:  ____________________________________________________________ Date:  ____________________________________________________________  
  
Osmar Campbell Provider Signature:  ____________________________________________________________ Date:  ____________________________________________________________

## 2017-03-30 NOTE — BRIEF OP NOTE
BRIEF OPERATIVE NOTE    Date of Procedure: 3/30/2017   Preoperative Diagnosis: Stenosis of left carotid artery [I65.22]  Postoperative Diagnosis: Stenosis of left carotid artery [I65.22    Procedure(s):  LEFT CAROTID ARTERY ENDARTERECTOMY  Surgeon(s) and Role:     Wilma Dixon MD - Primary     * Rancho De La Torre MD - Assisting        Surgical Staff:  Circ-1: Ben Foote RN  Circ-2: Elgin Sanchez  Circ-Relief: Catalina Sy RN  Scrub Tech-1: Ilean Gaucher  Scrub Tech-2: Giselle Hernandez  Scrub Tech-Relief: Yuki Valentin CNA  Event Time In   Incision Start 1105   Incision Close 1257     Anesthesia: General   Estimated Blood Loss: 60 mL  Specimens: * No specimens in log *   Findings: LICA plaque with severe stenosis. Normal completion Doppler signals. Complications: none  Implants:   Implant Name Type Inv.  Item Serial No.  Lot No. LRB No. Used [facility] VASC PERIPATCH [id number] -- Quan Hernández - [id number]   PATCH VASC PERIPATCH [id number] -- Lele Peel [employer] [id number] Left 1 Implanted

## 2017-03-30 NOTE — ANESTHESIA PROCEDURE NOTES
Arterial Line Placement    Start time: 3/30/2017 10:25 AM  End time: 3/30/2017 10:33 AM  Performed by: Kush Dangelo  Authorized by: Mik NORTON     Pre-Procedure  Indications:  Arterial pressure monitoring and blood sampling  Preanesthetic Checklist: patient identified, risks and benefits discussed, anesthesia consent, site marked, patient being monitored, timeout performed and patient being monitored    Timeout Time: 10:24        Procedure:   Prep:  Chlorhexidine  Orientation:  Left  Location:  Radial artery  Catheter size:  20 G  Number of attempts:  2    Assessment:   Post-procedure:  Sterile dressing applied  Patient Tolerance:  Patient tolerated the procedure well with no immediate complications  Comment:   IGNACIA Doshi first attempt  MIRTHA Sanabria CRNA second attempt

## 2017-03-30 NOTE — OP NOTES
[facility]   OPERATIVE REPORT       Name:  Paulina Kim   MR#:  [id number]   :  1945   Account #:  [de-identified]   Date of Adm:  2017       DATE OF SURGERY: 2017    PREOPERATIVE DIAGNOSIS: Left carotid stenosis. POSTOPERATIVE DIAGNOSIS: Left carotid stenosis. NAME OF PROCEDURE: Left carotid endarterectomy. SURGEON: Marianne Samuel MD.    ASSISTANT: Markel Mcneill MD.    ANESTHESIA: General with local supplement. ESTIMATED BLOOD LOSS: 60 mL. SPECIMEN: None. STATEMENT OF MEDICAL NECESSITY: The patient is a 76-year-old   woman who has severe bilateral carotid stenosis that has shown   progression by duplex ultrasound and is confirmed to be greater   than 70% diameter reduction bilaterally. PROCEDURE: The patient was taken to the operating room and   general anesthetic was administered. The left side of the neck   and the upper chest were prepped and draped in the usual sterile   fashion. An [place] cover was used. The skin was anesthetized   along the anterior border of the left sternocleidomastoid muscle   and a standard cervical incision was made. The carotid sheath   was exposed and the common internal and external carotid   arteries were carefully dissected and surrounded with umbilical   tapes. The vagus and hypoglossal nerves were identified and   preserved. The common facial vein was divided between ligatures   of 2-0 silk reinforced with clips. The patient was given 100 units of heparin per kilogram body   weight intravenously and then 2 minutes later the internal, then   the external and then the common carotid arteries in that order   were occluded with Rumels. A longitudinal arteriotomy was made   in the common carotid artery and extended to the plaque and then   into the normal distal left internal carotid artery.  An 8-Gu[place]alan   [place] shunt was then gently inserted into the internal carotid   artery, back-bled and then placed into the common carotid   artery. There arteries were somewhat small in diameter and that   is why 8-Yakut shunt was used rather than 10-Yakut. Patency of   the shunt was confirmed with continuous wave Doppler. The endarterectomy plane was then started in the deep layer of   arterial wall media using an elevator. The proximal endpoint was   sharply transected flush with the common carotid artery. The   external carotid artery was cleared with a standard eversion   technique. The distal endpoint feathered off nicely and was   secured with several tacking sutures of 6-0 Prolene. Two tacking   sutures of 6-0 Prolene were also used on the proximal endpoint. All loose shards of atheromatous debris were carefully removed   with microforceps and the surface of the endarterectomy was   thoroughly irrigated with heparinized saline to ensure   completeness. The arteriotomy was then closed with a standard bovine   pericardium patch anastomosed with 2 running 6-0 Prolene   sutures. Prior to completing the patch closure, the shunt was   divided and then removed from the internal and then the common   carotid artery. The external was then briefly released and the   surface of the endarterectomy was again thoroughly irrigated   with heparinized saline. As the patch closure was being   completed, the Rumel was released from the external to again   back bleed the bifurcation and flow was first restored into the   external carotid artery by releasing the Rumel from the common   carotid artery. Several heartbeats later, the Rumel was released   from the internal carotid artery. There was a normal palpable   pulse, normal external appearance and normal continuous wave   Doppler signal at the internal, external and common carotid   arteries including where the Rumels had been applied. Protamine   was then used to reverse the remaining heparin. The incision was   thoroughly irrigated and reanesthetized.  A small amount of   fibrillar was used to assist with topical hemostasis. The incision was then closed in layers with running 3-0 Vicryl   in the carotid sheath, running 3-0 Vicryl in the platysma and   running 4-0 subcuticular Monocryl in the skin. Dermabond was   applied to the skin incision. The patient tolerated the   procedure well and went to recovery in stable condition.         MD Maria De Jesus Coleman / Curtis Rios   D:  03/30/2017   13:10   T:  03/30/2017   15:50   Job #:  [id number]

## 2017-03-31 LAB — MAGNESIUM SERPL-MCNC: 1.8 MG/DL (ref 1.8–2.4)

## 2017-03-31 PROCEDURE — 65270000029 HC RM PRIVATE

## 2017-03-31 PROCEDURE — 74011250636 HC RX REV CODE- 250/636: Performed by: NURSE PRACTITIONER

## 2017-03-31 PROCEDURE — 74011250636 HC RX REV CODE- 250/636: Performed by: SURGERY

## 2017-03-31 PROCEDURE — 74011250637 HC RX REV CODE- 250/637: Performed by: SURGERY

## 2017-03-31 PROCEDURE — 83735 ASSAY OF MAGNESIUM: CPT | Performed by: SURGERY

## 2017-03-31 PROCEDURE — 36600 WITHDRAWAL OF ARTERIAL BLOOD: CPT

## 2017-03-31 RX ORDER — SODIUM CHLORIDE 9 MG/ML
75 INJECTION, SOLUTION INTRAVENOUS CONTINUOUS
Status: DISCONTINUED | OUTPATIENT
Start: 2017-03-31 | End: 2017-04-01 | Stop reason: HOSPADM

## 2017-03-31 RX ADMIN — PANTOPRAZOLE SODIUM 40 MG: 40 TABLET, DELAYED RELEASE ORAL at 09:27

## 2017-03-31 RX ADMIN — OXYCODONE HYDROCHLORIDE AND ACETAMINOPHEN 1 TABLET: 7.5; 325 TABLET ORAL at 05:35

## 2017-03-31 RX ADMIN — Medication 5 ML: at 14:20

## 2017-03-31 RX ADMIN — SODIUM CHLORIDE 75 ML/HR: 900 INJECTION, SOLUTION INTRAVENOUS at 16:00

## 2017-03-31 RX ADMIN — ASPIRIN 81 MG: 81 TABLET, COATED ORAL at 09:27

## 2017-03-31 RX ADMIN — ONDANSETRON 4 MG: 2 INJECTION INTRAMUSCULAR; INTRAVENOUS at 08:31

## 2017-03-31 RX ADMIN — ACETAMINOPHEN 325 MG: 325 TABLET, FILM COATED ORAL at 21:26

## 2017-03-31 RX ADMIN — ONDANSETRON 4 MG: 2 INJECTION INTRAMUSCULAR; INTRAVENOUS at 14:20

## 2017-03-31 RX ADMIN — SIMVASTATIN 20 MG: 20 TABLET, FILM COATED ORAL at 21:26

## 2017-03-31 RX ADMIN — ACETAMINOPHEN 325 MG: 325 TABLET, FILM COATED ORAL at 14:19

## 2017-03-31 RX ADMIN — LISINOPRIL AND HYDROCHLOROTHIAZIDE 1 TABLET: 12.5; 2 TABLET ORAL at 09:27

## 2017-03-31 RX ADMIN — CEFAZOLIN 2 G: 1 INJECTION, POWDER, FOR SOLUTION INTRAMUSCULAR; INTRAVENOUS; PARENTERAL at 05:26

## 2017-03-31 RX ADMIN — ONDANSETRON 4 MG: 2 INJECTION INTRAMUSCULAR; INTRAVENOUS at 05:43

## 2017-03-31 RX ADMIN — MAGNESIUM SULFATE HEPTAHYDRATE 1 G: 1 INJECTION, SOLUTION INTRAVENOUS at 06:26

## 2017-03-31 RX ADMIN — ACETAMINOPHEN 325 MG: 325 TABLET, FILM COATED ORAL at 09:27

## 2017-03-31 NOTE — PROGRESS NOTES
TRANSFER - OUT REPORT:    Verbal report given to Angelika Gaxiola RN(name) on [facility] [place]  being transferred to 7th floor(unit) for routine progression of care       Report consisted of patients Situation, Background, Assessment and   Recommendations(SBAR). Information from the following report(s) SBAR and OR Summary Med rec, Labs, NSR was reviewed with the receiving nurse. Lines:   Peripheral IV 03/30/17 Left Forearm (Active)   Site Assessment Clean, dry, & intact 3/31/2017  7:00 AM   Phlebitis Assessment 0 3/31/2017  7:00 AM   Infiltration Assessment 0 3/31/2017  7:00 AM   Dressing Status Clean, dry, & intact 3/31/2017  7:00 AM   Dressing Type Tape;Transparent 3/31/2017  7:00 AM   Hub Color/Line Status Green;Patent 3/31/2017  7:00 AM        Opportunity for questions and clarification was provided.       Patient transported with:   [employer]

## 2017-03-31 NOTE — PROGRESS NOTES
TRANSFER - IN REPORT:    Verbal report received from Jeremias RN(name) on Audelia Zepeda  being received from ICU(unit) for routine progression of care      Report consisted of patients Situation, Background, Assessment and   Recommendations(SBAR). Information from the following report(s) SBAR was reviewed with the receiving nurse. Opportunity for questions and clarification was provided. Assessment completed upon patients arrival to unit and care assumed.

## 2017-03-31 NOTE — PROGRESS NOTES
[address] . [place] [address] FAX: [phone]        Vascular Surgery Progress Note    Admit Date: 3/30/2017  POD 1 Day Post-Op    Procedure:  Procedure(s):  LEFT CAROTID ARTERY ENDARTERECTOMY      Subjective:     Patient has complaints of vomiting. Objective:     Blood pressure 143/59, pulse (!) 55, temperature 97 °F (36.1 °C), resp. rate 16, height 5' 3\" (1.6 m), weight 163 lb 12 oz (74.3 kg), SpO2 100 %.     Temp (24hrs), Av.1 °F (36.2 °C), Min:96.8 °F (36 °C), Max:97.5 °F (36.4 °C)      Physical Exam:  GENERAL: alert, cooperative, no distress, appears stated age, THROAT & NECK:  surgical dressing in place, HEART:  regular rate and rhythm, S1, S2 normal, no murmur, click, rub or gallop, INCISION:  neck  covered with surgical dressing, mild edema    Labs:   Recent Labs      17   1411   HGB  9.8*   WBC  4.5   K  4.0   GLU  176*       Data Review: images and reports reviewed  Current Facility-Administered Medications   Medication Dose Route Frequency    acetaminophen (TYLENOL) tablet 325 mg  325 mg Oral Q4H PRN    pantoprazole (PROTONIX) tablet 40 mg  40 mg Oral ACB    lisinopril-hydroCHLOROthiazide (PRINZIDE, ZESTORETIC) 20-12.5 mg per tablet 1 Tab  1 Tab Oral 7am    simvastatin (ZOCOR) tablet 20 mg  20 mg Oral QHS    sodium chloride (NS) flush 5-10 mL  5-10 mL IntraVENous Q8H    sodium chloride (NS) flush 5-10 mL  5-10 mL IntraVENous PRN    naloxone (NARCAN) injection 0.4 mg  0.4 mg IntraVENous PRN    aspirin delayed-release tablet 81 mg  81 mg Oral DAILY    magnesium sulfate 1 g/100 ml IVPB (premix or compounded)  1 g IntraVENous PRN    oxyCODONE-acetaminophen (PERCOCET 7.5) 7.5-325 mg per tablet 1 Tab  1 Tab Oral Q4H PRN    HYDROmorphone (PF) (DILAUDID) injection 0.5 mg  0.5 mg IntraVENous Q3H PRN    ondansetron (ZOFRAN) injection 4 mg  4 mg IntraVENous Q4H PRN    PHENYLephrine (NEOSYNEPHRINE) 30,000 mcg in 0.9% sodium chloride 250 mL infusion  mcg/min IntraVENous TITRATE    diphenhydrAMINE (BENADRYL) capsule 50 mg  50 mg Oral Q6H PRN    sodium chloride 0.9 % bolus infusion 500 mL  500 mL IntraVENous PRN    dextrose 5% - 0.45% NaCl with KCl 20 mEq/L infusion  125 mL/hr IntraVENous CONTINUOUS    niCARdipine (CARDENE) 25 mg in 0.9% sodium chloride 250 mL infusion  0-15 mg/hr IntraVENous TITRATE     Assessment:     Principal Problem:    Carotid stenosis, left (3/30/2017)    Active Problems:    Hypertension ()        Plan/Recommendations/Medical Decision Making:   Continue present treatment   -Continue watching patient for now since she had vomiting episode. May D/C later this afternoon    Elements of this note have been dictated using speech recognition software. As a result, errors of speech recognition may have occurred.

## 2017-03-31 NOTE — PROGRESS NOTES
Sat up in chair position in bed. C/o nausea that passed. Then Vomited 450 cc of cl to light green emesis. Zofran given. Assisted oob to chair. VS stable. No acute distress noted. IVF remained on. Mag this Am below 2.0.  1 gram given for replacement.

## 2017-04-01 VITALS
BODY MASS INDEX: 29.02 KG/M2 | TEMPERATURE: 98 F | HEART RATE: 74 BPM | WEIGHT: 163.75 LBS | DIASTOLIC BLOOD PRESSURE: 62 MMHG | HEIGHT: 63 IN | RESPIRATION RATE: 16 BRPM | SYSTOLIC BLOOD PRESSURE: 147 MMHG | OXYGEN SATURATION: 94 %

## 2017-04-01 PROBLEM — R11.2 POST-OPERATIVE NAUSEA AND VOMITING: Status: ACTIVE | Noted: 2017-04-01

## 2017-04-01 PROBLEM — Z98.890 POST-OPERATIVE NAUSEA AND VOMITING: Status: ACTIVE | Noted: 2017-04-01

## 2017-04-01 PROCEDURE — 74011250637 HC RX REV CODE- 250/637: Performed by: SURGERY

## 2017-04-01 RX ORDER — ONDANSETRON 4 MG/1
4 TABLET, ORALLY DISINTEGRATING ORAL
Qty: 30 TAB | Refills: 0 | Status: SHIPPED | OUTPATIENT
Start: 2017-04-01 | End: 2019-06-20

## 2017-04-01 RX ORDER — GUAIFENESIN 600 MG/1
TABLET, EXTENDED RELEASE ORAL
Qty: 30 TAB | Refills: 0 | Status: SHIPPED
Start: 2017-04-01 | End: 2017-05-11

## 2017-04-01 RX ADMIN — ACETAMINOPHEN 325 MG: 325 TABLET, FILM COATED ORAL at 05:29

## 2017-04-01 RX ADMIN — ASPIRIN 81 MG: 81 TABLET, COATED ORAL at 09:00

## 2017-04-01 RX ADMIN — LISINOPRIL AND HYDROCHLOROTHIAZIDE 1 TABLET: 12.5; 2 TABLET ORAL at 07:00

## 2017-04-01 RX ADMIN — PANTOPRAZOLE SODIUM 40 MG: 40 TABLET, DELAYED RELEASE ORAL at 05:28

## 2017-04-01 RX ADMIN — ACETAMINOPHEN 325 MG: 325 TABLET, FILM COATED ORAL at 09:52

## 2017-04-01 NOTE — PROGRESS NOTES
Discharge instructions reviewed with patient and pt's daughter. Opportunity for questions given. Follow up appt already scheduled. Paper copy of pt's signature placed in chart.

## 2017-04-01 NOTE — DISCHARGE INSTRUCTIONS
Call Dr. WALL [facility] for fever, chills, bleeding or other concerns  [phone]  No heavy lifting greater than 5lbs for 6 weeks  Shower ok, no tub bath, driving or swimming for 2 weeks

## 2017-04-01 NOTE — DISCHARGE SUMMARY
[address] . [facility] 125 FAX: [phone]        Physician Discharge Summary     Patient: Khanh Cam MRN: [id number]  SSN: [id number]    YOB: 1945  Age: 66 y.o. Sex: female       Admit Date: 3/30/2017    Discharge Date: 4/1/2017      Admitting Physician: Rodri Doherty MD     Discharge Physician: Charles Roberts NP/ Dr. Mica Grace    Admission Diagnoses: Stenosis of left carotid artery [I65.22]    Discharge Diagnoses:   Problem List as of 4/1/2017  Date Reviewed: 4/1/2017          Codes Class Noted - Resolved    Post-operative nausea and vomiting ICD-10-CM: R11.2, Z98.890  ICD-9-CM: 787.01  4/1/2017 - Present        * (Principal)Carotid stenosis, left ICD-10-CM: A60.14  ICD-9-CM: 433.10  3/30/2017 - Present    Overview Signed 4/1/2017  6:57 AM by Charles Roberts NP     3/30/17 (Dr Crystal Enriquez) Left carotid endarterectomy. Iron deficiency anemia ICD-10-CM: D50.9  ICD-9-CM: 280.9  3/21/2017 - Present    Overview Signed 3/21/2017  3:56 PM by Jena Posadas      : Has requires frequent transfusions. No obvious source identified. Coronary atherosclerosis of native coronary vessel ICD-10-CM: I25.10  ICD-9-CM: 414.01  3/21/2017 - Present    Overview Signed 3/21/2017  3:57 PM by Jena Garcia. Cardiac  Calcium score (12/28/08) : Total: 218. LAD: 129, Lcx: 29, RCA: 60  2. Lexiscan Cardiolite (3/9/10):  Apical thinning. No ischemia. Normal LV function EF 68%. Heart murmur ICD-10-CM: R01.1  ICD-9-CM: 785.2  3/21/2017 - Present    Overview Signed 3/21/2017  3:59 PM by Jena Garcia. Echo (6/28/12): Normal LV systolic function. EF 60-65%. Grade 1 diastolic dysfunction. Normal atrial sizes. Aortic valve sclerosis with no stenosis. Mean gradient 7.2. No effusion.                Carotid artery stenosis without cerebral infarction ICD-10-CM: I65.29  ICD-9-CM: 433.10  3/21/2017 - Present Hypertension ICD-10-CM: I10  ICD-9-CM: 401.9  Unknown - Present        Cancer ([facility] ICD-10-CM: C80.1  ICD-9-CM: 199.1  Unknown - Present    Overview Signed 12/10/2013  1:06 PM by Lynsey Liu             Occlusion and stenosis of carotid artery without mention of cerebral infarction ICD-10-CM: I65.29  ICD-9-CM: 433.10  12/10/2013 - Present    Overview Signed 12/10/2013  1:08 PM by Darryl Sullivan     Asymptomatic bilateral 50-69%             CKD (chronic kidney disease), stage III ICD-10-CM: N18.3  ICD-9-CM: 585.3  12/10/2013 - Present        Mcneil's esophagus ICD-10-CM: K22.70  ICD-9-CM: 530.85  12/10/2013 - Present        Dyslipidemia ICD-10-CM: E78.5  ICD-9-CM: 272.4  12/10/2013 - Present        Anemia ICD-10-CM: D64.9  ICD-9-CM: 285.9  12/10/2013 - Present        PAD (peripheral artery disease) ([facility] ICD-10-CM: I73.9  ICD-9-CM: 443.9  12/10/2013 - Present    Overview Addendum 3/21/2017  3:58 PM by Chuck Ospina       1. Left iliac stent in 2006. Procedures for this admission: Procedure(s):  LEFT CAROTID ARTERY ENDARTERECTOMY    Discharged Condition: stable    Hospital Course: Patient was admitted for L CEA. This was performed without incident and tolerated well by patient 3/30/17. Post op recovery complicated by N/V. She remained hospitalized for monitoring for additional 24 hours to monitor N/V and wound observation/monitoring. She remained stable overnight, vomiting improved with Zofran and gentle IVF. Nausea today lingering, with aversion to smells. She feels ready to go home. Zofran provided. Pain controlled with Tylenol, she plans to avoid narcotic pain relief due to nausea. Consults: None    Significant Diagnostic Studies: labs    Treatments: IV hydration, antibiotics: perioperative, anticoagulation: ASA and surgery: as above    Discharge Exam: Blood pressure 147/62, pulse 74, temperature 98 °F (36.7 °C), resp.  rate 16, height 5' 3\" (1.6 m), weight 163 lb 12 oz (74.3 kg), SpO2 94 %.     Temp (24hrs), Av.7 °F (36.5 °C), Min:97.3 °F (36.3 °C), Max:98.3 °F (36.8 °C)        Physical Exam: GENERAL: alert, cooperative, no distress, appears stated age, THROAT & NECK: surgical dressing in place, HEART: regular rate and rhythm, S1, S2 normal, no murmur, click, rub or gallop, INCISION: neck L incision with intact glue, mild ecchymosis and no hematoma. Neuro: speech fluent CN 2-12 grossly intact     Labs:       Recent Labs      17  1411   HGB 9.8*   WBC 4.5   K 4.0   *             Disposition: home    Patient Instructions:   Current Discharge Medication List      START taking these medications    Details   ondansetron (ZOFRAN ODT) 4 mg disintegrating tablet Take 1 Tab by mouth every eight (8) hours as needed for Nausea. Qty: 30 Tab, Refills: 0      guaiFENesin ER (MUCINEX) 600 mg ER tablet Over the counter Mucinex, take to keep secretions thin  Qty: 30 Tab, Refills: 0         CONTINUE these medications which have NOT CHANGED    Details   lisinopril-hydroCHLOROthiazide (PRINZIDE, ZESTORETIC) 20-12.5 mg per tablet Take 1 Tab by mouth every morning. aspirin delayed-release (ASPIRIN EC) 81 mg tablet Take 81 mg by mouth every morning. esomeprazole (NEXIUM) 40 mg capsule Take 20 mg by mouth every morning. acetaminophen (TYLENOL) 325 mg tablet Take 325 mg by mouth as needed for Pain.      simvastatin (ZOCOR) 20 mg tablet Take 1 Tab by mouth nightly. Qty: 90 Tab, Refills: 0      cholecalciferol, vitamin D3, (VITAMIN D3) 2,000 unit tab Take 2,000 Units by mouth. Couple times a week      vitamin E (AQUA GEMS) 400 unit capsule Take 400 Units by mouth. Couple times a week      ASCORBIC ACID (VITAMIN C PO) Take 1 Tab by mouth. Couple of times a week             Reference discharge instructions as provided by nursing for diet, labs, medications, activity, wound care and any outpatient referrals.     Follow-up Appointments   Procedures    FOLLOW UP VISIT Appointment in: Other (Specify) Dr Allyson Yoo 2 weeks (patient should already have appt)     Dr Allyson Yoo 2 weeks (patient should already have appt)     Standing Status:   Standing     Number of Occurrences:   1     Order Specific Question:   Appointment in     Answer: Other (Specify)        Signed:  Ricardo Pierre NP  4/1/2017  8:54 AM    Elements of this note have been dictated using speech recognition software. As a result, errors of speech recognition may have occurred. 4/13/2017     2:15 PM    I have personally seen and examined Kellen Render with  KHUSHI Hernandez. I agree and confirm findings in history, physical exam, and assessment/plan as outlined above, except as noted below.     Edmundo Marks MD

## 2017-04-01 NOTE — PROGRESS NOTES
[address] FAX: [phone]        Vascular Surgery Progress Note    Admit Date: 3/30/2017  POD 2 Day Post-Op    Procedure:  Procedure(s):  LEFT CAROTID ARTERY ENDARTERECTOMY      Subjective:     Patient without vomiting overnight, nausea better. Smells bothering her. Cough with thin mucous. No cerebrovascular symptoms overnight    Objective:     Blood pressure 147/62, pulse 74, temperature 98 °F (36.7 °C), resp. rate 16, height 5' 3\" (1.6 m), weight 163 lb 12 oz (74.3 kg), SpO2 94 %. Temp (24hrs), Av.7 °F (36.5 °C), Min:97.3 °F (36.3 °C), Max:98.3 °F (36.8 °C)      Physical Exam:  GENERAL: alert, cooperative, no distress, appears stated age, THROAT & NECK:  surgical dressing in place, HEART:  regular rate and rhythm, S1, S2 normal, no murmur, click, rub or gallop, INCISION:  neck  L incision with intact glue, mild ecchymosis and no hematoma.  Neuro: speech fluent CN 2-12 grossly intact    Labs:   Recent Labs      17   1411   HGB  9.8*   WBC  4.5   K  4.0   GLU  176*       Data Review: images and reports reviewed  Current Facility-Administered Medications   Medication Dose Route Frequency    0.9% sodium chloride infusion  75 mL/hr IntraVENous CONTINUOUS    acetaminophen (TYLENOL) tablet 325 mg  325 mg Oral Q4H PRN    pantoprazole (PROTONIX) tablet 40 mg  40 mg Oral ACB    lisinopril-hydroCHLOROthiazide (PRINZIDE, ZESTORETIC) 20-12.5 mg per tablet 1 Tab  1 Tab Oral 7am    simvastatin (ZOCOR) tablet 20 mg  20 mg Oral QHS    sodium chloride (NS) flush 5-10 mL  5-10 mL IntraVENous Q8H    sodium chloride (NS) flush 5-10 mL  5-10 mL IntraVENous PRN    naloxone (NARCAN) injection 0.4 mg  0.4 mg IntraVENous PRN    aspirin delayed-release tablet 81 mg  81 mg Oral DAILY    magnesium sulfate 1 g/100 ml IVPB (premix or compounded)  1 g IntraVENous PRN    oxyCODONE-acetaminophen (PERCOCET 7.5) 7.5-325 mg per tablet 1 Tab  1 Tab Oral Q4H PRN  ondansetron (ZOFRAN) injection 4 mg  4 mg IntraVENous Q4H PRN    diphenhydrAMINE (BENADRYL) capsule 50 mg  50 mg Oral Q6H PRN     Assessment:     Principal Problem:    Carotid stenosis, left (3/30/2017)      Overview: 3/30/17 (Dr Wilmer Guillen) Left carotid endarterectomy. Active Problems:    Hypertension ()        Plan/Recommendations/Medical Decision Making:   Doing better, feels ready to go home  Cough with mucous- recommend Mucinex    Elements of this note have been dictated using speech recognition software. As a result, errors of speech recognition may have occurred. 4/13/2017     2:15 PM    I have personally seen and examined Luis Fernandez with  KHUSHI Edmonds. I agree and confirm findings in history, physical exam, and assessment/plan as outlined above, except as noted below.     Dino Guajardo MD

## 2017-04-02 ENCOUNTER — PATIENT OUTREACH (OUTPATIENT)
Dept: CASE MANAGEMENT | Age: 72
End: 2017-04-02

## 2017-04-02 NOTE — PROGRESS NOTES
Transition of Care Discharge Follow-up Questionnaire   Date/Time of Call:   4/2/17 10:27a   What was the patient hospitalized for? Carotid stenosis, left   Does the patient understand his/her diagnosis and/or treatment and what happened during the hospitalization? Patient verbalized understanding of treatment and diagnosis. Did the patient receive discharge instructions? Yes   Review any discharge instructions (see notes in [facility]). Ask patient if they understand these. Do they have any questions? Patient verbalized an understanding of instructions. Patient did not have any questions. Were home services ordered (nursing, PT, OT, ST, etc.)? No   If so, has the first visit occurred? If not, why? (Assist with coordination of services if necessary.) n/a   Was any DME ordered? No   If so, has it been received? If not, why?  (Assist with coordination of arranging DME orders if necessary.) n/a   Complete a review of all medications (new, continued and discontinued meds per the D/C instructions and medication tab in [facility]). Patient is compliant with current medications. Were all new prescriptions filled? If not, why?  (Assist with obtainment of medications if necessary.) Yes, patient states medications were filled. Does the patient understand the purpose and dosing instructions for all medications? (If patient has questions, provide explanation and education.) She verbalizes understanding the purpose and dosing instructions for medications. Does the patient have any problems in performing ADLs? (If patient is unable to perform ADLs  what is the limiting factor(s)? Do they have a support system that can assist? If no support system is present, discuss possible assistance that they may be able to obtain.) Patient does not indicate having any problems in performing ADLs. Patient states that she has family support. Patient has not resumed driving as instructed.      Does the patient have all follow-up appointments scheduled? Has transportation been arranged? [facility] follow-up should be within 7 days of discharge; all others should have PCP follow-up within 7 days of discharge; follow-ups with other specialists as appropriate or ordered.) Patient was encouraged to schedule f/u appointment on Monday with PCP. Patient states that she has an appointment with Dr. Susanna Cedillo. Patient states she will be back in the hospital in 6 weeks for her Carotid artery. Any other questions or concerns expressed by the patient? Patient did not express any other concerns or questions. She thanked care coordinator for the call. Tony Garcia LPN  [facility]  Care Coordinator          This note will not be viewable in [address]

## 2017-04-20 ENCOUNTER — PATIENT OUTREACH (OUTPATIENT)
Dept: CASE MANAGEMENT | Age: 72
End: 2017-04-20

## 2017-04-20 NOTE — PROGRESS NOTES
TIMUR f/u outreach call to patient was unsuccessful. This note will not be viewable in [address]

## 2017-04-28 ENCOUNTER — PATIENT OUTREACH (OUTPATIENT)
Dept: CASE MANAGEMENT | Age: 72
End: 2017-04-28

## 2017-04-28 NOTE — PROGRESS NOTES
Transition of Care Discharge Follow-up Questionnaire   Date/Time of Call:   4/28/17 5:05p   What was the patient hospitalized for? Carotid stenosis, left   Does the patient understand his/her diagnosis and/or treatment and what happened during the hospitalization? Patient understands her diagnosis and treatment during hospitalization. Did the patient receive discharge instructions? Yes   Review any discharge instructions (see notes in [facility]). Ask patient if they understand these. Do they have any questions? Patient does not have any questions in regards to instructions. Were home services ordered (nursing, PT, OT, ST, etc.)? No   If so, has the first visit occurred? If not, why? (Assist with coordination of services if necessary.) n/a   Was any DME ordered? No   If so, has it been received? If not, why?  (Assist with coordination of arranging DME orders if necessary.) n/a   Complete a review of all medications (new, continued and discontinued meds per the D/C instructions and medication tab in [facility]). Patient and care coordinator reviewed current medications. Were all new prescriptions filled? If not, why?  (Assist with obtainment of medications if necessary.) Yes. Does the patient understand the purpose and dosing instructions for all medications? (If patient has questions, provide explanation and education.) Yes. Patient has stopped taking vitamins per physician orders for future surgery. Does the patient have any problems in performing ADLs? (If patient is unable to perform ADLs  what is the limiting factor(s)? Do they have a support system that can assist? If no support system is present, discuss possible assistance that they may be able to obtain.) Patient uses a walker for assistance with mobility. Patient has family support. Does the patient have all follow-up appointments scheduled? Has transportation been arranged?   [facility] follow-up should be within 7 days of discharge; all others should have PCP follow-up within 7 days of discharge; follow-ups with other specialists as appropriate or ordered.) Patient has not followed up with PCP. Patient will follow up with Vascular surgeon. Care coordinator educated patient on the importance of follow-up appointments. Any other questions or concerns expressed by the patient? No other questions or concerns were expressed by patient to care coordinator. She was appreciative for call. TIMUR Call Completed By: Melrose Closs, LPN  [facility] [address] [facility] Coordinator          This note will not be viewable in [address]

## 2017-05-11 ENCOUNTER — ANESTHESIA EVENT (OUTPATIENT)
Dept: SURGERY | Age: 72
DRG: 039 | End: 2017-05-11
Payer: MEDICARE

## 2017-05-11 ENCOUNTER — HOSPITAL ENCOUNTER (OUTPATIENT)
Dept: SURGERY | Age: 72
Discharge: HOME | End: 2017-05-11
Payer: MEDICARE

## 2017-05-11 VITALS
TEMPERATURE: 98.2 F | HEIGHT: 63 IN | WEIGHT: 166.06 LBS | RESPIRATION RATE: 18 BRPM | OXYGEN SATURATION: 98 % | BODY MASS INDEX: 29.42 KG/M2 | SYSTOLIC BLOOD PRESSURE: 179 MMHG | HEART RATE: 79 BPM | DIASTOLIC BLOOD PRESSURE: 85 MMHG

## 2017-05-11 LAB
ANION GAP BLD CALC-SCNC: 9 MMOL/L (ref 7–16)
BUN SERPL-MCNC: 23 MG/DL (ref 8–23)
CALCIUM SERPL-MCNC: 9.7 MG/DL (ref 8.3–10.4)
CHLORIDE SERPL-SCNC: 106 MMOL/L (ref 98–107)
CO2 SERPL-SCNC: 25 MMOL/L (ref 21–32)
CREAT SERPL-MCNC: 1.14 MG/DL (ref 0.6–1)
ERYTHROCYTE [DISTWIDTH] IN BLOOD BY AUTOMATED COUNT: 14.4 % (ref 11.9–14.6)
GLUCOSE SERPL-MCNC: 150 MG/DL (ref 65–100)
HCT VFR BLD AUTO: 33.1 % (ref 35.8–46.3)
HGB BLD-MCNC: 10.9 G/DL (ref 11.7–15.4)
MCH RBC QN AUTO: 27.7 PG (ref 26.1–32.9)
MCHC RBC AUTO-ENTMCNC: 32.9 G/DL (ref 31.4–35)
MCV RBC AUTO: 84 FL (ref 79.6–97.8)
PLATELET # BLD AUTO: 209 K/UL (ref 150–450)
PMV BLD AUTO: 9.5 FL (ref 10.8–14.1)
POTASSIUM SERPL-SCNC: 4.2 MMOL/L (ref 3.5–5.1)
RBC # BLD AUTO: 3.94 M/UL (ref 4.05–5.25)
SODIUM SERPL-SCNC: 140 MMOL/L (ref 136–145)
WBC # BLD AUTO: 4 K/UL (ref 4.3–11.1)

## 2017-05-11 PROCEDURE — 85027 COMPLETE CBC AUTOMATED: CPT | Performed by: SURGERY

## 2017-05-11 PROCEDURE — 80048 BASIC METABOLIC PNL TOTAL CA: CPT | Performed by: SURGERY

## 2017-05-11 RX ORDER — APREPITANT 40 MG/1
40 CAPSULE ORAL ONCE
Status: CANCELLED | OUTPATIENT
Start: 2017-05-15 | End: 2017-05-15

## 2017-05-11 NOTE — PERIOP NOTES
Recent Results (from the past 12 hour(s))   CBC W/O DIFF    Collection Time: 05/11/17  9:20 AM   Result Value Ref Range    WBC 4.0 (L) 4.3 - 11.1 K/uL    RBC 3.94 (L) 4.05 - 5.25 M/uL    HGB 10.9 (L) 11.7 - 15.4 g/dL    HCT 33.1 (L) 35.8 - 46.3 %    MCV 84.0 79.6 - 97.8 FL    MCH 27.7 26.1 - 32.9 PG    MCHC 32.9 31.4 - 35.0 g/dL    RDW 14.4 11.9 - 14.6 %    PLATELET 877 325 - 463 K/uL    MPV 9.5 (L) 10.8 - 04.0 FL   METABOLIC PANEL, BASIC    Collection Time: 05/11/17  9:20 AM   Result Value Ref Range    Sodium 140 136 - 145 mmol/L    Potassium 4.2 3.5 - 5.1 mmol/L    Chloride 106 98 - 107 mmol/L    CO2 25 21 - 32 mmol/L    Anion gap 9 7 - 16 mmol/L    Glucose 150 (H) 65 - 100 mg/dL    BUN 23 8 - 23 MG/DL    Creatinine 1.14 (H) 0.6 - 1.0 MG/DL    GFR est AA >60 >60 ml/min/1.73m2    GFR est non-AA 50 (L) >60 ml/min/1.73m2    Calcium 9.7 8.3 - 10.4 MG/DL   Reviewed

## 2017-05-11 NOTE — ANESTHESIA PREPROCEDURE EVALUATION
Anesthetic History     PONV          Review of Systems / Medical History  Patient summary reviewed and pertinent labs reviewed    Pulmonary  Within defined limits                 Neuro/Psych   Within defined limits           Cardiovascular    Hypertension: well controlled  Valvular problems/murmurs        PAD and hyperlipidemia  Pertinent negatives: CAD: denies. Exercise tolerance: >4 METS  Comments: Pt has heart murmur. Dr. Janusz Montalvo has ordered an ECHO prior to surgery and will need review.     GI/Hepatic/Renal     GERD: well controlled           Endo/Other        Arthritis and anemia     Other Findings              Physical Exam    Airway  Mallampati: II  TM Distance: 4 - 6 cm  Neck ROM: normal range of motion   Mouth opening: Normal     Cardiovascular  Regular rate and rhythm,  S1 and S2 normal,  no murmur, click, rub, or gallop  Rhythm: regular  Rate: normal         Dental    Dentition: Full upper dentures and Full lower dentures     Pulmonary  Breath sounds clear to auscultation               Abdominal  GI exam deferred       Other Findings            Anesthetic Plan    ASA: 3  Anesthesia type: general    Monitoring Plan: Arterial line      Induction: Intravenous

## 2017-05-11 NOTE — PERIOP NOTES
Patient verified name, , and surgery as listed in [facility]. TYPE  CASE:3  Orders per surgeon:  Received  Labs per surgeon:CBC,BMP,T/S DOS: results pending  Labs per anesthesia protocol: no additional  : results pending  EKG  :  3/30/17, echo 3/24/17  Dr Onelia Denson in to see pt, chart reviewed and pt approved for surgery. Emend 40mg po DOS ordered for PONV. Patient provided with handouts including guide to surgery , transfusions, pain management and hand hygiene for the family and community. Pt verbalizes understanding of all pre-op instructions . Instructed that family must be present in building at all times. Nothing to eat or drink after midnight the night prior to surgery. Hibiclens and instructions given per hospital policy. Instructed patient to continue  previous medications as prescribed prior to surgery and  to take the following medications the day of surgery according to anesthesia guidelines : Tylenol, ASA 81mg, nexium  Original medication prescription bottles not visualized during patient appointment. Continue all previous medications unless otherwise directed. Instructed patient to hold  the following medications prior to surgery: all vitamins and supplements      Patient verbalized understanding of all instructions and provided all medical/health information to the best of their ability.

## 2017-05-15 ENCOUNTER — ANESTHESIA (OUTPATIENT)
Dept: SURGERY | Age: 72
DRG: 039 | End: 2017-05-15
Payer: MEDICARE

## 2017-05-15 ENCOUNTER — HOSPITAL ENCOUNTER (INPATIENT)
Age: 72
LOS: 1 days | Discharge: HOME | DRG: 039 | End: 2017-05-16
Attending: SURGERY | Admitting: SURGERY
Payer: MEDICARE

## 2017-05-15 PROBLEM — I65.21 CAROTID STENOSIS, RIGHT: Status: ACTIVE | Noted: 2017-05-15

## 2017-05-15 LAB
ABO + RH BLD: NORMAL
ANION GAP BLD CALC-SCNC: 10 MMOL/L (ref 7–16)
BLOOD GROUP ANTIBODIES SERPL: NORMAL
BUN SERPL-MCNC: 22 MG/DL (ref 8–23)
CALCIUM SERPL-MCNC: 8.3 MG/DL (ref 8.3–10.4)
CHLORIDE SERPL-SCNC: 109 MMOL/L (ref 98–107)
CO2 SERPL-SCNC: 24 MMOL/L (ref 21–32)
CREAT SERPL-MCNC: 1.04 MG/DL (ref 0.6–1)
ERYTHROCYTE [DISTWIDTH] IN BLOOD BY AUTOMATED COUNT: 14.6 % (ref 11.9–14.6)
GLUCOSE SERPL-MCNC: 149 MG/DL (ref 65–100)
HCT VFR BLD AUTO: 29 % (ref 35.8–46.3)
HGB BLD-MCNC: 9.6 G/DL (ref 11.7–15.4)
MAGNESIUM SERPL-MCNC: 1.7 MG/DL (ref 1.8–2.4)
MCH RBC QN AUTO: 27.6 PG (ref 26.1–32.9)
MCHC RBC AUTO-ENTMCNC: 33.1 G/DL (ref 31.4–35)
MCV RBC AUTO: 83.3 FL (ref 79.6–97.8)
PLATELET # BLD AUTO: 178 K/UL (ref 150–450)
PMV BLD AUTO: 9.3 FL (ref 10.8–14.1)
POTASSIUM SERPL-SCNC: 3.8 MMOL/L (ref 3.5–5.1)
RBC # BLD AUTO: 3.48 M/UL (ref 4.05–5.25)
SODIUM SERPL-SCNC: 143 MMOL/L (ref 136–145)
SPECIMEN EXP DATE BLD: NORMAL
WBC # BLD AUTO: 5.3 K/UL (ref 4.3–11.1)

## 2017-05-15 PROCEDURE — 77030002996 HC SUT SLK J&J -A: Performed by: SURGERY

## 2017-05-15 PROCEDURE — 74011250636 HC RX REV CODE- 250/636

## 2017-05-15 PROCEDURE — 77030008477 HC STYL SATN SLP COVD -A: Performed by: ANESTHESIOLOGY

## 2017-05-15 PROCEDURE — 77030034850: Performed by: SURGERY

## 2017-05-15 PROCEDURE — 74011000250 HC RX REV CODE- 250: Performed by: SURGERY

## 2017-05-15 PROCEDURE — 77030031139 HC SUT VCRL2 J&J -A: Performed by: SURGERY

## 2017-05-15 PROCEDURE — 74011250636 HC RX REV CODE- 250/636: Performed by: SURGERY

## 2017-05-15 PROCEDURE — 77030032490 HC SLV COMPR SCD KNE COVD -B: Performed by: SURGERY

## 2017-05-15 PROCEDURE — 77030013292 HC BOWL MX PRSM J&J -A: Performed by: ANESTHESIOLOGY

## 2017-05-15 PROCEDURE — 77030008703 HC TU ET UNCUF COVD -A: Performed by: ANESTHESIOLOGY

## 2017-05-15 PROCEDURE — 77030005401 HC CATH RAD ARRO -A: Performed by: ANESTHESIOLOGY

## 2017-05-15 PROCEDURE — 77030010514 HC APPL CLP LIG COVD -B: Performed by: SURGERY

## 2017-05-15 PROCEDURE — 77030019908 HC STETH ESOPH SIMS -A: Performed by: ANESTHESIOLOGY

## 2017-05-15 PROCEDURE — 77030002933 HC SUT MCRYL J&J -A: Performed by: SURGERY

## 2017-05-15 PROCEDURE — 74011250637 HC RX REV CODE- 250/637: Performed by: SURGERY

## 2017-05-15 PROCEDURE — 80048 BASIC METABOLIC PNL TOTAL CA: CPT | Performed by: SURGERY

## 2017-05-15 PROCEDURE — 76060000035 HC ANESTHESIA 2 TO 2.5 HR: Performed by: SURGERY

## 2017-05-15 PROCEDURE — 65610000006 HC RM INTENSIVE CARE

## 2017-05-15 PROCEDURE — 03UK0KZ SUPPLEMENT RIGHT INTERNAL CAROTID ARTERY WITH NONAUTOLOGOUS TISSUE SUBSTITUTE, OPEN APPROACH: ICD-10-PCS | Performed by: SURGERY

## 2017-05-15 PROCEDURE — 77010033678 HC OXYGEN DAILY

## 2017-05-15 PROCEDURE — 77030020782 HC GWN BAIR PAWS FLX 3M -B: Performed by: ANESTHESIOLOGY

## 2017-05-15 PROCEDURE — 77030011640 HC PAD GRND REM COVD -A: Performed by: SURGERY

## 2017-05-15 PROCEDURE — 76010000171 HC OR TIME 2 TO 2.5 HR INTENSV-TIER 1: Performed by: SURGERY

## 2017-05-15 PROCEDURE — 77030010507 HC ADH SKN DERMBND J&J -B: Performed by: SURGERY

## 2017-05-15 PROCEDURE — 77030013794 HC KT TRNSDUC BLD EDWD -B: Performed by: ANESTHESIOLOGY

## 2017-05-15 PROCEDURE — 74011250637 HC RX REV CODE- 250/637: Performed by: ANESTHESIOLOGY

## 2017-05-15 PROCEDURE — 74011250636 HC RX REV CODE- 250/636: Performed by: ANESTHESIOLOGY

## 2017-05-15 PROCEDURE — C1768 GRAFT, VASCULAR: HCPCS | Performed by: SURGERY

## 2017-05-15 PROCEDURE — 86900 BLOOD TYPING SEROLOGIC ABO: CPT | Performed by: SURGERY

## 2017-05-15 PROCEDURE — 77030018836 HC SOL IRR NACL ICUM -A: Performed by: SURGERY

## 2017-05-15 PROCEDURE — 77030034888 HC SUT PROL 2 J&J -B: Performed by: SURGERY

## 2017-05-15 PROCEDURE — 74011000250 HC RX REV CODE- 250

## 2017-05-15 PROCEDURE — 83735 ASSAY OF MAGNESIUM: CPT | Performed by: SURGERY

## 2017-05-15 PROCEDURE — 03CK0Z6 EXTIRPATE MATTER FROM R INT CAROTID, BIFURC, OPEN: ICD-10-PCS | Performed by: SURGERY

## 2017-05-15 PROCEDURE — 77030014008 HC SPNG HEMSTAT J&J -C: Performed by: SURGERY

## 2017-05-15 PROCEDURE — 85027 COMPLETE CBC AUTOMATED: CPT | Performed by: SURGERY

## 2017-05-15 PROCEDURE — 77030018673: Performed by: SURGERY

## 2017-05-15 PROCEDURE — 77030018824 HC SHNT CAR ARGY COVD -B: Performed by: SURGERY

## 2017-05-15 PROCEDURE — 77030020407 HC IV BLD WRMR ST 3M -A: Performed by: ANESTHESIOLOGY

## 2017-05-15 DEVICE — XENOSURE BIOLOGIC PATCH, 0.8CM X 8CM, EIFU
Type: IMPLANTABLE DEVICE | Site: CAROTID | Status: FUNCTIONAL
Brand: XENOSURE BIOLOGIC PATCH

## 2017-05-15 RX ORDER — SODIUM CHLORIDE 9 MG/ML
50 INJECTION, SOLUTION INTRAVENOUS CONTINUOUS
Status: DISCONTINUED | OUTPATIENT
Start: 2017-05-15 | End: 2017-05-15 | Stop reason: HOSPADM

## 2017-05-15 RX ORDER — DIPHENHYDRAMINE HYDROCHLORIDE 50 MG/ML
12.5 INJECTION, SOLUTION INTRAMUSCULAR; INTRAVENOUS ONCE
Status: DISCONTINUED | OUTPATIENT
Start: 2017-05-15 | End: 2017-05-15

## 2017-05-15 RX ORDER — ONDANSETRON 2 MG/ML
INJECTION INTRAMUSCULAR; INTRAVENOUS AS NEEDED
Status: DISCONTINUED | OUTPATIENT
Start: 2017-05-15 | End: 2017-05-15 | Stop reason: HOSPADM

## 2017-05-15 RX ORDER — DIPHENHYDRAMINE HCL 25 MG
50 CAPSULE ORAL
Status: DISCONTINUED | OUTPATIENT
Start: 2017-05-15 | End: 2017-05-16 | Stop reason: HOSPADM

## 2017-05-15 RX ORDER — CEFAZOLIN SODIUM IN 0.9 % NACL 2 G/50 ML
2 INTRAVENOUS SOLUTION, PIGGYBACK (ML) INTRAVENOUS EVERY 8 HOURS
Status: COMPLETED | OUTPATIENT
Start: 2017-05-15 | End: 2017-05-16

## 2017-05-15 RX ORDER — SODIUM CHLORIDE, SODIUM LACTATE, POTASSIUM CHLORIDE, CALCIUM CHLORIDE 600; 310; 30; 20 MG/100ML; MG/100ML; MG/100ML; MG/100ML
100 INJECTION, SOLUTION INTRAVENOUS CONTINUOUS
Status: DISCONTINUED | OUTPATIENT
Start: 2017-05-15 | End: 2017-05-15

## 2017-05-15 RX ORDER — PROPOFOL 10 MG/ML
INJECTION, EMULSION INTRAVENOUS AS NEEDED
Status: DISCONTINUED | OUTPATIENT
Start: 2017-05-15 | End: 2017-05-15 | Stop reason: HOSPADM

## 2017-05-15 RX ORDER — ONDANSETRON 2 MG/ML
4 INJECTION INTRAMUSCULAR; INTRAVENOUS
Status: DISCONTINUED | OUTPATIENT
Start: 2017-05-15 | End: 2017-05-16 | Stop reason: HOSPADM

## 2017-05-15 RX ORDER — OXYCODONE AND ACETAMINOPHEN 5; 325 MG/1; MG/1
1 TABLET ORAL
Status: DISCONTINUED | OUTPATIENT
Start: 2017-05-15 | End: 2017-05-16 | Stop reason: HOSPADM

## 2017-05-15 RX ORDER — SODIUM CHLORIDE 0.9 % (FLUSH) 0.9 %
5-10 SYRINGE (ML) INJECTION EVERY 8 HOURS
Status: DISCONTINUED | OUTPATIENT
Start: 2017-05-15 | End: 2017-05-16 | Stop reason: HOSPADM

## 2017-05-15 RX ORDER — LIDOCAINE HYDROCHLORIDE 10 MG/ML
INJECTION INFILTRATION; PERINEURAL AS NEEDED
Status: DISCONTINUED | OUTPATIENT
Start: 2017-05-15 | End: 2017-05-15 | Stop reason: HOSPADM

## 2017-05-15 RX ORDER — LIDOCAINE HYDROCHLORIDE 10 MG/ML
0.1 INJECTION INFILTRATION; PERINEURAL AS NEEDED
Status: DISCONTINUED | OUTPATIENT
Start: 2017-05-15 | End: 2017-05-15 | Stop reason: HOSPADM

## 2017-05-15 RX ORDER — FAMOTIDINE 20 MG/1
20 TABLET, FILM COATED ORAL ONCE
Status: COMPLETED | OUTPATIENT
Start: 2017-05-15 | End: 2017-05-15

## 2017-05-15 RX ORDER — SODIUM CHLORIDE 0.9 % (FLUSH) 0.9 %
5-10 SYRINGE (ML) INJECTION AS NEEDED
Status: DISCONTINUED | OUTPATIENT
Start: 2017-05-15 | End: 2017-05-15

## 2017-05-15 RX ORDER — ASPIRIN 81 MG/1
81 TABLET ORAL
Status: DISCONTINUED | OUTPATIENT
Start: 2017-05-16 | End: 2017-05-16 | Stop reason: HOSPADM

## 2017-05-15 RX ORDER — NICARDIPINE HYDROCHLORIDE 0.1 MG/ML
5-15 INJECTION INTRAVENOUS
Status: DISCONTINUED | OUTPATIENT
Start: 2017-05-15 | End: 2017-05-15 | Stop reason: SDUPTHER

## 2017-05-15 RX ORDER — ACETAMINOPHEN 325 MG/1
325 TABLET ORAL
Status: DISCONTINUED | OUTPATIENT
Start: 2017-05-15 | End: 2017-05-16 | Stop reason: HOSPADM

## 2017-05-15 RX ORDER — SODIUM CHLORIDE, SODIUM LACTATE, POTASSIUM CHLORIDE, CALCIUM CHLORIDE 600; 310; 30; 20 MG/100ML; MG/100ML; MG/100ML; MG/100ML
100 INJECTION, SOLUTION INTRAVENOUS CONTINUOUS
Status: DISCONTINUED | OUTPATIENT
Start: 2017-05-15 | End: 2017-05-15 | Stop reason: HOSPADM

## 2017-05-15 RX ORDER — POTASSIUM CHLORIDE 20 MEQ/1
20 TABLET, EXTENDED RELEASE ORAL
Status: COMPLETED | OUTPATIENT
Start: 2017-05-15 | End: 2017-05-15

## 2017-05-15 RX ORDER — GLYCOPYRROLATE 0.2 MG/ML
INJECTION INTRAMUSCULAR; INTRAVENOUS AS NEEDED
Status: DISCONTINUED | OUTPATIENT
Start: 2017-05-15 | End: 2017-05-15 | Stop reason: HOSPADM

## 2017-05-15 RX ORDER — ROCURONIUM BROMIDE 10 MG/ML
INJECTION, SOLUTION INTRAVENOUS AS NEEDED
Status: DISCONTINUED | OUTPATIENT
Start: 2017-05-15 | End: 2017-05-15 | Stop reason: HOSPADM

## 2017-05-15 RX ORDER — MORPHINE SULFATE 10 MG/ML
5 INJECTION, SOLUTION INTRAMUSCULAR; INTRAVENOUS
Status: DISCONTINUED | OUTPATIENT
Start: 2017-05-15 | End: 2017-05-16 | Stop reason: HOSPADM

## 2017-05-15 RX ORDER — HEPARIN SODIUM 1000 [USP'U]/ML
INJECTION, SOLUTION INTRAVENOUS; SUBCUTANEOUS AS NEEDED
Status: DISCONTINUED | OUTPATIENT
Start: 2017-05-15 | End: 2017-05-15 | Stop reason: HOSPADM

## 2017-05-15 RX ORDER — LIDOCAINE HYDROCHLORIDE 20 MG/ML
INJECTION, SOLUTION EPIDURAL; INFILTRATION; INTRACAUDAL; PERINEURAL AS NEEDED
Status: DISCONTINUED | OUTPATIENT
Start: 2017-05-15 | End: 2017-05-15 | Stop reason: HOSPADM

## 2017-05-15 RX ORDER — DEXAMETHASONE SODIUM PHOSPHATE 100 MG/10ML
INJECTION INTRAMUSCULAR; INTRAVENOUS AS NEEDED
Status: DISCONTINUED | OUTPATIENT
Start: 2017-05-15 | End: 2017-05-15 | Stop reason: HOSPADM

## 2017-05-15 RX ORDER — NEOSTIGMINE METHYLSULFATE 1 MG/ML
INJECTION INTRAVENOUS AS NEEDED
Status: DISCONTINUED | OUTPATIENT
Start: 2017-05-15 | End: 2017-05-15 | Stop reason: HOSPADM

## 2017-05-15 RX ORDER — SODIUM CHLORIDE 9 MG/ML
INJECTION, SOLUTION INTRAVENOUS
Status: DISCONTINUED | OUTPATIENT
Start: 2017-05-15 | End: 2017-05-15 | Stop reason: HOSPADM

## 2017-05-15 RX ORDER — MAGNESIUM SULFATE 1 G/100ML
1 INJECTION INTRAVENOUS AS NEEDED
Status: DISCONTINUED | OUTPATIENT
Start: 2017-05-15 | End: 2017-05-16 | Stop reason: HOSPADM

## 2017-05-15 RX ORDER — PANTOPRAZOLE SODIUM 40 MG/1
40 TABLET, DELAYED RELEASE ORAL
Status: DISCONTINUED | OUTPATIENT
Start: 2017-05-16 | End: 2017-05-16 | Stop reason: HOSPADM

## 2017-05-15 RX ORDER — DEXTROSE, SODIUM CHLORIDE, AND POTASSIUM CHLORIDE 5; .45; .15 G/100ML; G/100ML; G/100ML
125 INJECTION INTRAVENOUS CONTINUOUS
Status: DISCONTINUED | OUTPATIENT
Start: 2017-05-15 | End: 2017-05-16 | Stop reason: HOSPADM

## 2017-05-15 RX ORDER — ASPIRIN 81 MG/1
81 TABLET ORAL DAILY
Status: DISCONTINUED | OUTPATIENT
Start: 2017-05-16 | End: 2017-05-15 | Stop reason: SDUPTHER

## 2017-05-15 RX ORDER — PROTAMINE SULFATE 10 MG/ML
INJECTION, SOLUTION INTRAVENOUS AS NEEDED
Status: DISCONTINUED | OUTPATIENT
Start: 2017-05-15 | End: 2017-05-15 | Stop reason: HOSPADM

## 2017-05-15 RX ORDER — MIDAZOLAM HYDROCHLORIDE 1 MG/ML
2 INJECTION, SOLUTION INTRAMUSCULAR; INTRAVENOUS ONCE
Status: DISCONTINUED | OUTPATIENT
Start: 2017-05-15 | End: 2017-05-15 | Stop reason: HOSPADM

## 2017-05-15 RX ORDER — SODIUM CHLORIDE 9 MG/ML
250 INJECTION, SOLUTION INTRAVENOUS AS NEEDED
Status: DISCONTINUED | OUTPATIENT
Start: 2017-05-15 | End: 2017-05-16 | Stop reason: HOSPADM

## 2017-05-15 RX ORDER — HEPARIN SODIUM 5000 [USP'U]/ML
INJECTION, SOLUTION INTRAVENOUS; SUBCUTANEOUS AS NEEDED
Status: DISCONTINUED | OUTPATIENT
Start: 2017-05-15 | End: 2017-05-15 | Stop reason: HOSPADM

## 2017-05-15 RX ORDER — CEFAZOLIN SODIUM IN 0.9 % NACL 2 G/50 ML
2 INTRAVENOUS SOLUTION, PIGGYBACK (ML) INTRAVENOUS ONCE
Status: COMPLETED | OUTPATIENT
Start: 2017-05-15 | End: 2017-05-15

## 2017-05-15 RX ORDER — MIDAZOLAM HYDROCHLORIDE 1 MG/ML
2 INJECTION, SOLUTION INTRAMUSCULAR; INTRAVENOUS
Status: DISCONTINUED | OUTPATIENT
Start: 2017-05-15 | End: 2017-05-15 | Stop reason: HOSPADM

## 2017-05-15 RX ORDER — OXYCODONE AND ACETAMINOPHEN 5; 325 MG/1; MG/1
1 TABLET ORAL AS NEEDED
Status: DISCONTINUED | OUTPATIENT
Start: 2017-05-15 | End: 2017-05-15

## 2017-05-15 RX ORDER — FENTANYL CITRATE 50 UG/ML
25 INJECTION, SOLUTION INTRAMUSCULAR; INTRAVENOUS ONCE
Status: DISCONTINUED | OUTPATIENT
Start: 2017-05-15 | End: 2017-05-15 | Stop reason: HOSPADM

## 2017-05-15 RX ORDER — ESMOLOL HYDROCHLORIDE 10 MG/ML
INJECTION INTRAVENOUS AS NEEDED
Status: DISCONTINUED | OUTPATIENT
Start: 2017-05-15 | End: 2017-05-15 | Stop reason: HOSPADM

## 2017-05-15 RX ORDER — OXYCODONE HYDROCHLORIDE 5 MG/1
5 TABLET ORAL
Status: DISCONTINUED | OUTPATIENT
Start: 2017-05-15 | End: 2017-05-15

## 2017-05-15 RX ORDER — SODIUM CHLORIDE 0.9 % (FLUSH) 0.9 %
5-10 SYRINGE (ML) INJECTION AS NEEDED
Status: DISCONTINUED | OUTPATIENT
Start: 2017-05-15 | End: 2017-05-15 | Stop reason: HOSPADM

## 2017-05-15 RX ORDER — BUPIVACAINE HYDROCHLORIDE 2.5 MG/ML
INJECTION, SOLUTION EPIDURAL; INFILTRATION; INTRACAUDAL AS NEEDED
Status: DISCONTINUED | OUTPATIENT
Start: 2017-05-15 | End: 2017-05-15 | Stop reason: HOSPADM

## 2017-05-15 RX ORDER — SODIUM CHLORIDE 0.9 % (FLUSH) 0.9 %
5-10 SYRINGE (ML) INJECTION EVERY 8 HOURS
Status: DISCONTINUED | OUTPATIENT
Start: 2017-05-15 | End: 2017-05-15 | Stop reason: HOSPADM

## 2017-05-15 RX ORDER — LISINOPRIL AND HYDROCHLOROTHIAZIDE 12.5; 2 MG/1; MG/1
1 TABLET ORAL
Status: DISCONTINUED | OUTPATIENT
Start: 2017-05-16 | End: 2017-05-16 | Stop reason: HOSPADM

## 2017-05-15 RX ORDER — FENTANYL CITRATE 50 UG/ML
INJECTION, SOLUTION INTRAMUSCULAR; INTRAVENOUS AS NEEDED
Status: DISCONTINUED | OUTPATIENT
Start: 2017-05-15 | End: 2017-05-15 | Stop reason: HOSPADM

## 2017-05-15 RX ORDER — SIMVASTATIN 10 MG/1
20 TABLET, FILM COATED ORAL
Status: DISCONTINUED | OUTPATIENT
Start: 2017-05-15 | End: 2017-05-16 | Stop reason: HOSPADM

## 2017-05-15 RX ORDER — NALOXONE HYDROCHLORIDE 0.4 MG/ML
0.4 INJECTION, SOLUTION INTRAMUSCULAR; INTRAVENOUS; SUBCUTANEOUS AS NEEDED
Status: DISCONTINUED | OUTPATIENT
Start: 2017-05-15 | End: 2017-05-16 | Stop reason: HOSPADM

## 2017-05-15 RX ORDER — SODIUM CHLORIDE 0.9 % (FLUSH) 0.9 %
5-10 SYRINGE (ML) INJECTION AS NEEDED
Status: DISCONTINUED | OUTPATIENT
Start: 2017-05-15 | End: 2017-05-16 | Stop reason: HOSPADM

## 2017-05-15 RX ORDER — HYDROMORPHONE HYDROCHLORIDE 1 MG/ML
0.5 INJECTION, SOLUTION INTRAMUSCULAR; INTRAVENOUS; SUBCUTANEOUS
Status: DISCONTINUED | OUTPATIENT
Start: 2017-05-15 | End: 2017-05-15

## 2017-05-15 RX ADMIN — NEOSTIGMINE METHYLSULFATE 3 MG: 1 INJECTION INTRAVENOUS at 15:50

## 2017-05-15 RX ADMIN — FAMOTIDINE 20 MG: 20 TABLET ORAL at 11:29

## 2017-05-15 RX ADMIN — PROPOFOL 150 MG: 10 INJECTION, EMULSION INTRAVENOUS at 14:01

## 2017-05-15 RX ADMIN — CEFAZOLIN 2 G: 1 INJECTION, POWDER, FOR SOLUTION INTRAMUSCULAR; INTRAVENOUS; PARENTERAL at 21:43

## 2017-05-15 RX ADMIN — DEXAMETHASONE SODIUM PHOSPHATE 10 MG: 100 INJECTION INTRAMUSCULAR; INTRAVENOUS at 14:22

## 2017-05-15 RX ADMIN — ESMOLOL HYDROCHLORIDE 20 MG: 10 INJECTION INTRAVENOUS at 15:58

## 2017-05-15 RX ADMIN — ROCURONIUM BROMIDE 50 MG: 10 INJECTION, SOLUTION INTRAVENOUS at 14:01

## 2017-05-15 RX ADMIN — MORPHINE SULFATE 5 MG: 10 INJECTION INTRAMUSCULAR; INTRAVENOUS; SUBCUTANEOUS at 17:45

## 2017-05-15 RX ADMIN — Medication 10 ML: at 19:54

## 2017-05-15 RX ADMIN — ONDANSETRON 4 MG: 2 INJECTION INTRAMUSCULAR; INTRAVENOUS at 14:22

## 2017-05-15 RX ADMIN — SODIUM CHLORIDE: 9 INJECTION, SOLUTION INTRAVENOUS at 14:01

## 2017-05-15 RX ADMIN — SODIUM CHLORIDE, SODIUM LACTATE, POTASSIUM CHLORIDE, AND CALCIUM CHLORIDE 1000 ML: 600; 310; 30; 20 INJECTION, SOLUTION INTRAVENOUS at 11:29

## 2017-05-15 RX ADMIN — POTASSIUM CHLORIDE 20 MEQ: 20 TABLET, EXTENDED RELEASE ORAL at 21:43

## 2017-05-15 RX ADMIN — FENTANYL CITRATE 50 MCG: 50 INJECTION, SOLUTION INTRAMUSCULAR; INTRAVENOUS at 14:15

## 2017-05-15 RX ADMIN — Medication 10 ML: at 21:43

## 2017-05-15 RX ADMIN — SIMVASTATIN 20 MG: 10 TABLET, FILM COATED ORAL at 21:42

## 2017-05-15 RX ADMIN — CEFAZOLIN 2 G: 1 INJECTION, POWDER, FOR SOLUTION INTRAMUSCULAR; INTRAVENOUS; PARENTERAL at 14:15

## 2017-05-15 RX ADMIN — LIDOCAINE HYDROCHLORIDE 60 MG: 20 INJECTION, SOLUTION EPIDURAL; INFILTRATION; INTRACAUDAL; PERINEURAL at 14:01

## 2017-05-15 RX ADMIN — FENTANYL CITRATE 50 MCG: 50 INJECTION, SOLUTION INTRAMUSCULAR; INTRAVENOUS at 14:22

## 2017-05-15 RX ADMIN — HEPARIN SODIUM 7500 UNITS: 1000 INJECTION, SOLUTION INTRAVENOUS; SUBCUTANEOUS at 14:43

## 2017-05-15 RX ADMIN — FENTANYL CITRATE 100 MCG: 50 INJECTION, SOLUTION INTRAMUSCULAR; INTRAVENOUS at 13:49

## 2017-05-15 RX ADMIN — LIDOCAINE HYDROCHLORIDE 40 MG: 20 INJECTION, SOLUTION EPIDURAL; INFILTRATION; INTRACAUDAL; PERINEURAL at 13:58

## 2017-05-15 RX ADMIN — MAGNESIUM SULFATE HEPTAHYDRATE 1 G: 1 INJECTION, SOLUTION INTRAVENOUS at 19:53

## 2017-05-15 RX ADMIN — DEXTROSE MONOHYDRATE, SODIUM CHLORIDE, AND POTASSIUM CHLORIDE 125 ML/HR: 50; 4.5; 1.49 INJECTION, SOLUTION INTRAVENOUS at 17:47

## 2017-05-15 RX ADMIN — PROTAMINE SULFATE 40 MG: 10 INJECTION, SOLUTION INTRAVENOUS at 15:35

## 2017-05-15 RX ADMIN — ESMOLOL HYDROCHLORIDE 30 MG: 10 INJECTION INTRAVENOUS at 15:57

## 2017-05-15 RX ADMIN — MIDAZOLAM HYDROCHLORIDE 2 MG: 1 INJECTION, SOLUTION INTRAMUSCULAR; INTRAVENOUS at 12:25

## 2017-05-15 RX ADMIN — SODIUM CHLORIDE, SODIUM LACTATE, POTASSIUM CHLORIDE, AND CALCIUM CHLORIDE: 600; 310; 30; 20 INJECTION, SOLUTION INTRAVENOUS at 13:47

## 2017-05-15 RX ADMIN — GLYCOPYRROLATE 0.1 MG: 0.2 INJECTION INTRAMUSCULAR; INTRAVENOUS at 14:29

## 2017-05-15 RX ADMIN — GLYCOPYRROLATE 0.4 MG: 0.2 INJECTION INTRAMUSCULAR; INTRAVENOUS at 15:50

## 2017-05-15 NOTE — IP AVS SNAPSHOT
[facility] [place] 
 
 
 [address] [place] [address] 
[phone] Patient: Audelia Zepeda MRN: [id number] ETU:[id number] You are allergic to the following No active allergies Recent Documentation Height Weight BMI OB Status Smoking Status 1.6 m 74.9 kg 29.24 kg/m2 Hysterectomy Former Smoker Emergency Contacts Name Discharge Info Relation Home Work Mobile Brad Zepeda DISCHARGE CAREGIVER [3] Spouse [3] [phone] Mary Card DISCHARGE CAREGIVER [3] Child [2] [phone] About your hospitalization You were admitted on:  May 15, 2017 You last received care in the:  [facility] 1 CV INTENSIVE CARE You were discharged on:  May 16, 2017 Unit phone number:  [phone] Why you were hospitalized Your primary diagnosis was:  Carotid Stenosis, Right Your diagnoses also included:  Hypertension Providers Seen During Your Hospitalizations Provider Role Specialty Primary office phone Magdalene Juarez MD Attending Provider Vascular Surgery [phone] Your Primary Care Physician (PCP) Primary Care Physician Office Phone Office Fax [facility] [phone] Follow-up Information Follow up With Details Comments Contact Info Rosibel Bain MD   [address] [facility] [address] 
[phone] Magdalene Juarez MD On 5/20/2017 at 10:00 AM [facility] [address] 
[phone] Your Appointments Tuesday June 20, 2017 10:00 AM EDT Global Post Op with Magdalene Juarez MD  
[facility] [address] [facility] [id number] [phone] Current Discharge Medication List  
  
CONTINUE these medications which have NOT CHANGED Dose & Instructions Dispensing Information Comments Morning Noon Evening Bedtime Aspirin EC 81 mg tablet Generic drug:  aspirin delayed-release Your last dose was: Your next dose is:    
   
   
 Dose:  81 mg Take 81 mg by mouth every morning. Refills:  0  
     
   
   
   
  
 lisinopril-hydroCHLOROthiazide 20-12.5 mg per tablet Commonly known as:  Lauraine Lass Your last dose was: Your next dose is:    
   
   
 Dose:  1 Tab Take 1 Tab by mouth every morning. Refills:  0 NexIUM 40 mg capsule Generic drug:  esomeprazole Your last dose was: Your next dose is:    
   
   
 Dose:  20 mg Take 20 mg by mouth every morning. Refills:  0  
     
   
   
   
  
 ondansetron 4 mg disintegrating tablet Commonly known as:  ZOFRAN ODT Your last dose was: Your next dose is:    
   
   
 Dose:  4 mg Take 1 Tab by mouth every eight (8) hours as needed for Nausea. Quantity:  30 Tab Refills:  0  
     
   
   
   
  
 simvastatin 20 mg tablet Commonly known as:  ZOCOR Your last dose was: Your next dose is:    
   
   
 Dose:  20 mg Take 1 Tab by mouth nightly. Quantity:  90 Tab Refills:  0  
     
   
   
   
  
 TYLENOL 325 mg tablet Generic drug:  acetaminophen Your last dose was: Your next dose is:    
   
   
 Dose:  325 mg Take 325 mg by mouth as needed for Pain. Refills:  0  
     
   
   
   
  
 VITAMIN C PO Your last dose was: Your next dose is:    
   
   
 Dose:  1 Tab Take 1 Tab by mouth. Couple of times a week Refills:  0  
     
   
   
   
  
 VITAMIN D3 2,000 unit Tab Generic drug:  cholecalciferol (vitamin D3) Your last dose was: Your next dose is:    
   
   
 Dose:  2000 Units Take 2,000 Units by mouth. Couple times a week Refills:  0  
     
   
   
   
  
 vitamin E 400 unit capsule Commonly known as:  Ibis Ag 83 Your last dose was: Your next dose is:    
   
   
 Dose:  400 Units Take 400 Units by mouth. Couple times a week Refills:  0 Discharge Instructions DISCHARGE SUMMARY from Nurse The following personal items are in your possession at time of discharge: 
 
Dental Appliances: Uppers, Lowers Visual Aid: Glasses, At home Home Medications: None Jewelry: None Clothing: Footwear, Pants, Shirt, Socks, Undergarments Other Valuables: [address] PATIENT INSTRUCTIONS: 
 
 
F-face looks uneven A-arms unable to move or move unevenly S-speech slurred or non-existent T-time-call 911 as soon as signs and symptoms begin-DO NOT go Back to bed or wait to see if you get better-TIME IS BRAIN. Warning Signs of HEART ATTACK Call 911 if you have these symptoms: 
? Chest discomfort. Most heart attacks involve discomfort in the center of the chest that lasts more than a few minutes, or that goes away and comes back. It can feel like uncomfortable pressure, squeezing, fullness, or pain. ? Discomfort in other areas of the upper body. Symptoms can include pain or discomfort in one or both arms, the back, neck, jaw, or stomach. ? Shortness of breath with or without chest discomfort. ? Other signs may include breaking out in a cold sweat, nausea, or lightheadedness. Don't wait more than five minutes to call [address]! Fast action can save your life. Calling 911 is almost always the fastest way to get lifesaving treatment. Emergency Medical Services staff can begin treatment when they arrive  up to an hour sooner than if someone gets to the hospital by car. The discharge information has been reviewed with the patient. The patient verbalized understanding.  
 
Discharge medications reviewed with the patient and appropriate educational materials and side effects teaching were provided. Carotid Endarterectomy: What to Expect at [facility] Your Recovery A carotid endarterectomy (say \"ziggy-RAW-maddie wyatt-tuh-BK-federico-nola\") is surgery to remove fatty build-up (plaque) from one of the carotid arteries. There are two carotid arteriesone on each side of the neckthat supply blood to the brain. When plaque builds up in either artery, it can make it hard for blood to flow to the brain. This surgery may lower your risk of having a stroke. You may have a sore throat for a few days. You can expect the cut (incision) in your neck to be sore for about a week. The area around the incision may also be swollen and bruised at first. The area in front of the incision may be numb. This usually gets better after 6 to 12 months. Your doctor closed the incision in your neck with stitches. The stitches will be removed 7 to 10 days after surgery, or you may have stitches that dissolve on their own. You may feel more tired than usual for several weeks after surgery. You will probably be able to go back to work or your usual activities in 1 to 2 weeks. This care sheet gives you a general idea about how long it will take for you to recover. But each person recovers at a different pace. Follow the steps below to feel better as quickly as possible. How can you care for yourself at home? Activity · Rest when you feel tired. Getting enough sleep will help you recover. · Try to walk each day. Start by walking a little more than you did the day before. Bit by bit, increase the amount you walk. Walking boosts blood flow and helps prevent pneumonia and constipation. · Avoid strenuous activities, such as bicycle riding, jogging, weight lifting, or aerobic exercise. Your doctor will tell you when it's okay to do strenuous activity. · For 1 to 2 weeks, avoid lifting anything that would make you strain. This may include a child, heavy grocery bags and milk containers, a heavy briefcase or backpack, cat litter or dog food bags, or a vacuum . · Ask your doctor when you can drive again. · You will probably need to take 1 to 2 weeks off from work. It depends on the type of work you do and how you feel. · You may shower and take baths as usual. But do not soak the incision for the first 2 weeks, or until your doctor tells you it is okay. Pat the incision dry. · Your doctor will tell you when you can have sex again. Diet · You can eat your normal diet. If your stomach is upset, try bland, low-fat foods like plain rice, broiled chicken, toast, and yogurt. · Drink plenty of fluids (unless your doctor tells you not to). · You may notice that your bowel movements are not regular right after your surgery. This is common. Try to avoid constipation and straining with bowel movements. You may want to take a fiber supplement every day. If you have not had a bowel movement after a couple of days, ask your doctor about taking a mild laxative. Medicines · Your doctor will tell you if and when you can restart your medicines. He or she will also give you instructions about taking any new medicines. · If you take blood thinners, such as warfarin (Coumadin), clopidogrel (Plavix), or aspirin, be sure to talk to your doctor. He or she will tell you if and when to start taking those medicines again. Make sure that you understand exactly what your doctor wants you to do. · Your doctor may advise you to take aspirin when you go home. This helps prevent blood clots. Take your medicines exactly as prescribed. Call your doctor if you think you are having a problem with your medicine. · Be safe with medicines. Take pain medicines exactly as directed. ¨ If the doctor gave you a prescription medicine for pain, take it as prescribed.  
¨ If you are not taking a prescription pain medicine, ask your doctor if you can take an over-the-counter medicine. ¨ Do not take two or more pain medicines at the same time unless the doctor told you to. Many pain medicines have acetaminophen, which is Tylenol. Too much acetaminophen (Tylenol) can be harmful. · If you think your pain medicine is making you sick to your stomach: 
¨ Take your medicine after meals (unless your doctor has told you not to). ¨ Ask your doctor for a different pain medicine. · If your doctor prescribed antibiotics, take them as directed. Do not stop taking them just because you feel better. You need to take the full course of antibiotics. · If you take a blood thinner, such as aspirin, be sure you get instructions about how to take your medicine safely. Blood thinners can cause serious bleeding problems. Incision care · If you have strips of tape over your incision, leave the tape on for a week or until it falls off. · Wash the area daily with water and pat it dry. Other cleaning products, such as hydrogen peroxide, can make the wound heal more slowly. You may cover the area with a gauze bandage if it weeps or rubs against clothing. Change the bandage every day. · Keep the area clean and dry. Follow-up care is a key part of your treatment and safety. Be sure to make and go to all appointments, and call your doctor if you are having problems. It's also a good idea to know your test results and keep a list of the medicines you take. When should you call for help? Call 911 anytime you think you may need emergency care. For example, call if: 
· You passed out (lost consciousness). · You have severe trouble breathing. · You have a tight bulge in your neck on the side where the surgery was done. · You have symptoms of a stroke. These may include: 
¨ Sudden numbness, tingling, weakness, or loss of movement in your face, arm, or leg, especially on only one side of your body. ¨ Sudden vision changes. ¨ Sudden trouble speaking. ¨ Sudden confusion or trouble understanding simple statements. ¨ Sudden problems with walking or balance. ¨ A sudden, severe headache that is different from past headaches. · You have chest pain or pressure. This may occur with: ¨ Sweating. ¨ Shortness of breath. ¨ Nausea or vomiting. ¨ Pain that spreads from the chest to the neck, jaw, or one or both shoulders or arms. ¨ Dizziness or lightheadedness. ¨ A fast or uneven pulse. After calling 911, chew 1 adult-strength or 2 to 4 low-dose aspirin. Wait for an ambulance. Do not try to drive yourself. Call your doctor now or seek immediate medical care if: 
· You are sick to your stomach or cannot keep fluids down. · You have pain that does not get better after you take pain medicine. · You have a fever over 100°F. 
· You have loose stitches, or your incision comes open. · Bright red blood has soaked through the bandage over your incision. · You have signs of infection, such as: 
¨ Increased pain, swelling, warmth, or redness. ¨ Red streaks leading from the incisions. ¨ Pus draining from the incisions. ¨ Swollen lymph nodes in your neck, armpits, or groin. ¨ A fever. Watch closely for any changes in your health, and be sure to contact your doctor if you have any problems. Where can you learn more? Go to http://brent-marleny.info/. Enter R612 in the search box to learn more about \"Carotid Endarterectomy: What to Expect at Home. \" Current as of: January 27, 2016 Content Version: 11.2 © 8893-5622 [employer]. Care instructions adapted under license by [employer] (which disclaims liability or warranty for this information). If you have questions about a medical condition or this instruction, always ask your healthcare professional. [place] [address] any warranty or liability for your use of this information. Stopping Smoking: Care Instructions Your Care Instructions Cigarette smokers crave the nicotine in cigarettes. Giving it up is much harder than simply changing a habit. Your body has to stop craving the nicotine. It is hard to quit, but you can do it. There are many tools that people use to quit smoking. You may find that combining tools works best for you. There are several steps to quitting. First you get ready to quit. Then you get support to help you. After that, you learn new skills and behaviors to become a nonsmoker. For many people, a necessary step is getting and using medicine. Your doctor will help you set up the plan that best meets your needs. You may want to attend a smoking cessation program to help you quit smoking. When you choose a program, look for one that has proven success. Ask your doctor for ideas. You will greatly increase your chances of success if you take medicine as well as get counseling or join a cessation program. 
Some of the changes you feel when you first quit tobacco are uncomfortable. Your body will miss the nicotine at first, and you may feel short-tempered and grumpy. You may have trouble sleeping or concentrating. Medicine can help you deal with these symptoms. You may struggle with changing your smoking habits and rituals. The last step is the tricky one: Be prepared for the smoking urge to continue for a time. This is a lot to deal with, but keep at it. You will feel better. Follow-up care is a key part of your treatment and safety. Be sure to make and go to all appointments, and call your doctor if you are having problems. Its also a good idea to know your test results and keep a list of the medicines you take. How can you care for yourself at home? · Ask your family, friends, and coworkers for support. You have a better chance of quitting if you have help and support. · Join a support group, such as Nicotine Anonymous, for people who are trying to quit smoking. · Consider signing up for a smoking cessation program, such as the American Lung Association's Freedom from Smoking program. 
· Set a quit date. Pick your date carefully so that it is not right in the middle of a big deadline or stressful time. Once you quit, do not even take a puff. Get rid of all ashtrays and lighters after your last cigarette. Clean your house and your clothes so that they do not smell of smoke. · Learn how to be a nonsmoker. Think about ways you can avoid those things that make you reach for a cigarette. ¨ Avoid situations that put you at greatest risk for smoking. For some people, it is hard to have a drink with friends without smoking. For others, they might skip a coffee break with coworkers who smoke. ¨ Change your daily routine. Take a different route to work or eat a meal in a different place. · Cut down on stress. Calm yourself or release tension by doing an activity you enjoy, such as reading a book, taking a hot bath, or gardening. · Talk to your doctor or pharmacist about nicotine replacement therapy, which replaces the nicotine in your body. You still get nicotine but you do not use tobacco. Nicotine replacement products help you slowly reduce the amount of nicotine you need. These products come in several forms, many of them available over-the-counter: ¨ Nicotine patches ¨ Nicotine gum and lozenges ¨ Nicotine inhaler · Ask your doctor about bupropion (Wellbutrin) or varenicline (Chantix), which are prescription medicines. They do not contain nicotine. They help you by reducing withdrawal symptoms, such as stress and anxiety. · Some people find hypnosis, acupuncture, and massage helpful for ending the smoking habit. · Eat a healthy diet and get regular exercise. Having healthy habits will help your body move past its craving for nicotine. · Be prepared to keep trying.  Most people are not successful the first few times they try to quit. Do not get mad at yourself if you smoke again. Make a list of things you learned and think about when you want to try again, such as next week, next month, or next year. Where can you learn more? Go to http://brent-marleny.info/. Enter H611 in the search box to learn more about \"Stopping Smoking: Care Instructions. \" Current as of: May 26, 2016 Content Version: 11.2 © 0088-6363 [employer]. Care instructions adapted under license by [employer] (which disclaims liability or warranty for this information). If you have questions about a medical condition or this instruction, always ask your healthcare professional. [address] any warranty or liability for your use of this information. Heart-Healthy Diet: Care Instructions Your Care Instructions A heart-healthy diet has lots of vegetables, fruits, nuts, beans, and whole grains, and is low in salt. It limits foods that are high in saturated fat, such as meats, cheeses, and fried foods. It may be hard to change your diet, but even small changes can lower your risk of heart attack and heart disease. Follow-up care is a key part of your treatment and safety. Be sure to make and go to all appointments, and call your doctor if you are having problems. It's also a good idea to know your test results and keep a list of the medicines you take. How can you care for yourself at home? Watch your portions · Learn what a serving is. A \"serving\" and a \"portion\" are not always the same thing. Make sure that you are not eating larger portions than are recommended. For example, a serving of pasta is ½ cup. A serving size of meat is 2 to 3 ounces. A 3-ounce serving is about the size of a deck of cards. Measure serving sizes until you are good at [place]" them. Keep in mind that restaurants often serve portions that are 2 or 3 times the size of one serving. · To keep your energy level up and keep you from feeling hungry, eat often but in smaller portions. · Eat only the number of calories you need to stay at a healthy weight. If you need to lose weight, eat fewer calories than your body burns (through exercise and other physical activity). Eat more fruits and vegetables · Eat a variety of fruit and vegetables every day. Dark green, deep orange, red, or yellow fruits and vegetables are especially good for you. Examples include spinach, carrots, peaches, and berries. · Keep carrots, celery, and other veggies handy for snacks. Buy fruit that is in season and store it where you can see it so that you will be tempted to eat it. · Cook dishes that have a lot of veggies in them, such as stir-fries and soups. Limit saturated and trans fat · Read food labels, and try to avoid saturated and trans fats. They increase your risk of heart disease. Trans fat is found in many processed foods such as cookies and crackers. · Use olive or canola oil when you cook. Try cholesterol-lowering spreads, such as Benecol or Take Control. · Bake, broil, grill, or steam foods instead of frying them. · Choose lean meats instead of high-fat meats such as hot dogs and sausages. Cut off all visible fat when you prepare meat. · Eat fish, skinless poultry, and meat alternatives such as soy products instead of high-fat meats. Soy products, such as tofu, may be especially good for your heart. · Choose low-fat or fat-free milk and dairy products. Eat fish · Eat at least two servings of fish a week. Certain fish, such as salmon and tuna, contain omega-3 fatty acids, which may help reduce your risk of heart attack. Eat foods high in fiber · Eat a variety of grain products every day. Include whole-grain foods that have lots of fiber and nutrients. Examples of whole-grain foods include oats, whole wheat bread, and brown rice. · Buy whole-grain breads and cereals, instead of white bread or pastries. Limit salt and sodium · Limit how much salt and sodium you eat to help lower your blood pressure. · Taste food before you salt it. Add only a little salt when you think you need it. With time, your taste buds will adjust to less salt. · Eat fewer snack items, fast foods, and other high-salt, processed foods. Check food labels for the amount of sodium in packaged foods. · Choose low-sodium versions of canned goods (such as soups, vegetables, and beans). Limit sugar · Limit drinks and foods with added sugar. These include candy, desserts, and soda pop. Limit alcohol · Limit alcohol to no more than 2 drinks a day for men and 1 drink a day for women. Too much alcohol can cause health problems. When should you call for help? Watch closely for changes in your health, and be sure to contact your doctor if: 
· You would like help planning heart-healthy meals. Where can you learn more? Go to http://brent-marleny.info/. Enter V137 in the search box to learn more about \"Heart-Healthy Diet: Care Instructions. \" Current as of: January 27, 2016 Content Version: 11.2 © 7629-1172 [employer]. Care instructions adapted under license by [employer] (which disclaims liability or warranty for this information). If you have questions about a medical condition or this instruction, always ask your healthcare professional. [place] [address] any warranty or liability for your use of this information. Pain Medicine Side Effects: Care Instructions Your Care Instructions When you go to a medical facility in pain, you may get a strong medicine to give you relief. The medicine may be given in a vein (by IV) or as an injection (shot). Examples of this type of pain medicine include fentanyl, hydromorphone, and morphine. While these medicines help relieve pain, they also have side effects. For your safety, it's important that you know how this strong pain medicine affects you. Common side effects can include: 
· Nausea or vomiting. · Feeling dizzy or lightheaded. · Feeling sleepy. The doctor has checked you carefully, but problems can develop later. If you notice any problems or new symptoms, get medical treatment right away. Follow-up care is a key part of your treatment and safety. Be sure to make and go to all appointments, and call your doctor if you are having problems. It's also a good idea to know your test results and keep a list of the medicines you take. How can you care for yourself at home? Activity · Don't do anything for 24 hours that requires attention to detail. This medicine makes your mind foggy. It takes time for the effects to wear off completely. · Don't drive a car until you are sure the effects from the medicine are gone. Medicines · Be safe with medicines. Read and follow all instructions on the label. ¨ If the doctor gave you a prescription medicine for pain, take it as prescribed. ¨ If you are not taking a prescription pain medicine, ask your doctor if you can take an over-the-counter medicine. Diet · You can eat your normal diet, unless your doctor gives you other instructions. If your stomach is upset, try clear liquids and bland, low-fat foods like plain toast or rice. · Drink plenty of fluids (unless your doctor tells you not to). · Don't drink alcohol for 24 hours. When should you call for help? Call 911 anytime you think you may need emergency care. For example, call if: 
· You have trouble breathing. · You passed out (lost consciousness). Call your doctor now or seek immediate medical care if: 
· You have new or worse pain. Watch closely for changes in your health, and be sure to contact your doctor if: 
· You do not get better as expected. Where can you learn more? Go to http://brent-marleny.info/. Enter G910 in the search box to learn more about \"Pain Medicine Side Effects: Care Instructions. \" Current as of: October 14, 2016 Content Version: 11.2 © 2285-6805 [employer]. Care instructions adapted under license by [employer] (which disclaims liability or warranty for this information). If you have questions about a medical condition or this instruction, always ask your healthcare professional. [address] any warranty or liability for your use of this information. Pain Medicine: Care Instructions Your Care Instructions Pain can keep you from doing the things you want to do. Medicine may help you feel better. There are many kinds of pain medicine. One type you can buy over the counter is acetaminophen (Tylenol). Other medicines help both pain and swelling. These are called nonsteroidal anti-inflammatory drugs (NSAIDs). They include aspirin, ibuprofen (Advil, Motrin), and naproxen (Aleve). All of these drugs can cause side effects. Take them just as the package label tells you to. The most common side effects are stomach upset, heartburn, and nausea. Taking these drugs with food may help. If you take NSAIDs often, you could get stomach ulcers or kidney problems. This can also happen if you take them for a long time. NSAIDs rarely cause a bad allergic reaction. Many pain medicines need to be prescribed by a doctor. Some of these drugs, called opioids, can be addicting. Examples are hydrocodone, morphine, fentanyl, and codeine. They often can be used safely if you are under a doctor's care. Follow-up care is a key part of your treatment and safety. Be sure to make and go to all appointments, and call your doctor if you are having problems. It's also a good idea to know your test results and keep a list of the medicines you take. How can you care for yourself at home? · Be safe with medicines.  If you take an over-the-counter pain medicine, such as acetaminophen (Tylenol), ibuprofen (Advil, Motrin), or naproxen (Aleve), read and follow all instructions on the label. · Do not give aspirin to anyone younger than 20. It has been linked to Reye syndrome, a serious illness. · Be careful when taking over-the-counter cold or flu medicines and Tylenol at the same time. Many of these medicines contain acetaminophen, which is Tylenol. Read the labels to make sure that you are not taking more than the recommended dose. Too much Tylenol can be harmful. · Do not take two or more pain medicines at the same time unless the doctor told you to. · Do not drink alcohol and take pain medicine at the same time. · If your pain pills make you constipated: ¨ Talk to your doctor about a laxative. ¨ Drink plenty of fluids, enough so that your urine is light yellow or clear like water. Drink water, fruit juice, or other drinks that do not contain caffeine or alcohol. If you have kidney, heart, or liver disease and have to limit fluids, talk with your doctor before you increase the amount of fluids you drink. ¨ Take fiber, such as Citrucel or Metamucil, daily if needed. Read and follow all instructions on the label. If you take pain medicine for more than a few days, talk to your doctor before you take fiber. When should you call for help? Call your doctor now or seek immediate medical care if: 
· Your pain medicine is not easing your pain. · You have stomach pain, an upset stomach, or heartburn that lasts or comes back. · You can't sleep because of the pain. Watch closely for changes in your health, and be sure to contact your doctor if: 
· You do not get better as expected. Where can you learn more? Go to http://brent-marleny.info/. Enter 68 [id number] 46 in the search box to learn more about \"Pain Medicine: Care Instructions. \" Current as of: November 15, 2016 Content Version: 11.2 © 6757-2225 [employer].  Care instructions adapted under license by [address] (which disclaims liability or warranty for this information). If you have questions about a medical condition or this instruction, always ask your healthcare professional. [address] any warranty or liability for your use of this information. Discharge Orders None ACO Transitions of Care Introducing [employer] [address] Silvia Verma offers a voluntary care coordination program to provide high quality service and care to [facility] fee-for-service beneficiaries. Michelle Ayala was designed to help you enhance your health and well-being through the following services: ? Transitions of Care  support for individuals who are transitioning from one care setting to another (example: Hospital to home). ? Chronic and Complex Care Coordination  support for individuals and caregivers of those with serious or chronic illnesses or with more than one chronic (ongoing) condition and those who take a number of different medications. If you meet specific medical criteria, a Critical access hospital [address] may call you directly to coordinate your care with your primary care physician and your other care providers. For questions about the [facility] programs, please, contact your physicians office. For general questions or additional information about Accountable Care Organizations: 
Please visit www.medicare.gov/acos. html or call 1-800-MEDICARE (7-731.622.4056) TTY users should call 0-161.132.1661. General Information Please provide this summary of care documentation to your next provider. Patient Signature:  ____________________________________________________________ Date:  ____________________________________________________________  
  
[facility] Provider Signature:  ____________________________________________________________ Date:  ____________________________________________________________

## 2017-05-15 NOTE — ANESTHESIA PROCEDURE NOTES
Arterial Line Placement    Start time: 5/15/2017 1:10 PM  End time: 5/15/2017 1:15 PM  Performed by: Sánchez De Jesus  Authorized by: Sánchez De Jesus     Pre-Procedure  Indications:  Arterial pressure monitoring and blood sampling  Preanesthetic Checklist: patient identified, risks and benefits discussed, anesthesia consent, site marked, patient being monitored, timeout performed and patient being monitored    Timeout Time: 13:10        Procedure:   Prep:  ChloraPrep  Seldinger Technique?: Yes    Orientation:  Right  Location:  Radial artery  Catheter size:  20 G  Number of attempts:  1  Cont Cardiac Output Sensor: No      Assessment:   Post-procedure:  Line secured and sterile dressing applied  Patient Tolerance:  Patient tolerated the procedure well with no immediate complications  Comment:   1 mL 1% lidocaine

## 2017-05-15 NOTE — PERIOP NOTES
TRANSFER - OUT REPORT:    Verbal report given to Kilo Knutson on [employer]  being transferred to cvu for routine progression of care       Report consisted of patients Situation, Background, Assessment and   Recommendations(SBAR). Information from the following report(s) OR Summary was reviewed with the receiving nurse. Lines:   Peripheral IV 05/15/17 Left Forearm (Active)   Site Assessment Clean, dry, & intact 5/15/2017 11:28 AM   Phlebitis Assessment 0 5/15/2017 11:28 AM   Infiltration Assessment 0 5/15/2017 11:28 AM   Dressing Status Clean, dry, & intact 5/15/2017 11:28 AM   Dressing Type Tape;Transparent 5/15/2017 11:28 AM   Hub Color/Line Status Pink; Infusing 5/15/2017 11:28 AM       Peripheral IV 05/15/17 Right Arm (Active)       Arterial Line 05/15/17 Right Radial artery (Active)        Opportunity for questions and clarification was provided.       Patient transported with:   Monitor  O2 @ 4 liters  Registered Nurse  CRNA

## 2017-05-15 NOTE — PROGRESS NOTES
TRANSFER - IN REPORT:    Verbal report received from Jimmy FRANCO(name) on Audelia Zepeda  being received from OR  (unit) for routine progression of care      Report consisted of patients Situation, Background, Assessment and   Recommendations(SBAR). Information from the following report(s) OR Summary and Procedure Summary was reviewed with the receiving nurse. Screening Assessment for C Diff:     1. Three (3) or more diarrheal (liquid unformed) stools in less than 24 hours  no     2. If yes, has patient off laxatives for more than 24 hours? Not applicable     3. Was a stool specimen sent for C. Difficile toxin A and B? No     4. Was the patient placed on contact isolation? No    Opportunity for questions and clarification was provided. Assessment completed upon patients arrival to unit and care assumed. Cardene at 5 mg/hr.

## 2017-05-15 NOTE — ANESTHESIA POSTPROCEDURE EVALUATION
Post-Anesthesia Evaluation and Assessment    Patient: Maria Luisa Eldridge MRN: [id number]  SSN: [id number]    YOB: 1945  Age: 67 y.o. Sex: female       Cardiovascular Function/Vital Signs  Visit Vitals    /47 (BP 1 Location: Right arm, BP Patient Position: At rest)    Pulse 63    Temp 36.6 °C (97.8 °F)    Resp 16    Ht 5' 3\" (1.6 m)    Wt 74.9 kg (165 lb 1 oz)    SpO2 99%    BMI 29.24 kg/m2       Patient is status post general anesthesia for Procedure(s):  RIGHT CAROTID ENDARTERECTOMY . Nausea/Vomiting: None    Postoperative hydration reviewed and adequate. Pain:  Pain Scale 1: Numeric (0 - 10) (05/15/17 1745)  Pain Intensity 1: 7 (05/15/17 1745)   Managed    Neurological Status:   Neuro (WDL): Within Defined Limits (05/15/17 1109)  Neuro  Assessment L Pupil: Brisk;Round (05/15/17 1700)  Size L Pupil (mm): 2 (05/15/17 1700)  Assessment R Pupil: Brisk;Round (05/15/17 1700)  Size R Pupil (mm): 2 (05/15/17 1700)   At baseline    Mental Status and Level of Consciousness: Arousable    Pulmonary Status:   O2 Device: Nasal cannula (05/15/17 1610)   Adequate oxygenation and airway patent    Complications related to anesthesia: None    Post-anesthesia assessment completed.  No concerns    Signed By: Janak Elizondo MD     May 15, 2017

## 2017-05-15 NOTE — H&P
[facility]   [address] [place] [address] , [place]. [address] [facility] [address]  Phone: [phone]  Fax: [phone]    Ekaterina Number  : 1945    Follow-up of carotid artery stenosis. History of Present Illness: Patient recently underwent LCEA and has done well. She now presents for right CEA for >80% stenosis. No post-op issues. She remains asymptomatic. Past Medical History:   Diagnosis Date    Anemia     Mcneil's esophagus     Coronary atherosclerosis of native coronary vessel     no stents, no CABG, increased calcium score    GERD (gastroesophageal reflux disease)     daily meds    HLD (hyperlipidemia)     Hypercholesterolemia     Hypertension     Mild aortic stenosis     echo: 3/24/17  GINETTE 1.7 cm2, peak velocity 2.2 m/s, mean pressure gradient 10 mmHg, peak pressure gradient 212 mm Hg.  Occlusion and stenosis of carotid artery without mention of cerebral infarction     Asymptomatic bilateral 50-69%    Osteoarthritis     feet    PAD (peripheral artery disease) (HCC)     PONV (postoperative nausea and vomiting)     S/P insertion of iliac artery stent     left    Shingles        Comprehensive ROS negative other than as stated in HPI.     Current Facility-Administered Medications   Medication Dose Route Frequency Provider Last Rate Last Dose    ceFAZolin in 0.9% NS (ANCEF) IVPB soln 2 g  2 g IntraVENous Moriah Nguyen MD   Stopped at 05/15/17 1129    lidocaine (XYLOCAINE) 10 mg/mL (1 %) injection 0.1 mL  0.1 mL SubCUTAneous PRN Samm Hollis MD        lactated ringers infusion  100 mL/hr IntraVENous CONTINUOUS Samm Hollis MD        lactated ringers bolus infusion 1,000 mL  1,000 mL IntraVENous ONCE PRN Samm Hollis MD 4,000 mL/hr at 05/15/17 1129 1,000 mL at 05/15/17 1129    0.9% sodium chloride infusion  50 mL/hr IntraVENous CONTINUOUS Samm Hollis MD        sodium chloride (NS) flush 5-10 mL  5-10 mL IntraVENous Q8H Amnada Anurag Kim MD        sodium chloride (NS) flush 5-10 mL  5-10 mL IntraVENous PRN Eugene Castro MD        fentaNYL citrate (PF) injection 25 mcg  25 mcg IntraVENous Jeannette MD Josias        midazolam (VERSED) injection 2 mg  2 mg IntraVENous ONCE PRN Eugene Castro MD   2 mg at 05/15/17 1225    midazolam (VERSED) injection 2 mg  2 mg IntraVENous ONCE Eugene Castro MD           No Known Allergies    Physical Examination:   Vitals:    05/15/17 1120   BP: 170/81   BP 1 Location: Right arm   BP Patient Position: At rest   Pulse: 76   Resp: 18   Temp: 98.2 °F (36.8 °C)   SpO2: 100%   Weight: 165 lb 1 oz (74.9 kg)   Height: 5' 3\" (1.6 m)     Visit Vitals    /81 (BP 1 Location: Right arm, BP Patient Position: At rest)    Pulse 76    Temp 98.2 °F (36.8 °C)    Resp 18    Ht 5' 3\" (1.6 m)    Wt 165 lb 1 oz (74.9 kg)    SpO2 100%    BMI 29.24 kg/m2     General appearance: alert, cooperative, no distress, appears stated age  Head: Normocephalic, without obvious abnormality, atraumatic  Neck: supple, symmetrical, trachea midline, no JVD and well-healed left cervical incision. Lungs: clear to auscultation bilaterally  Heart: regular rate and rhythm, S1, S2 normal, no murmur, click, rub or gallop  Neurologic: Grossly normal    Imaging: CTA    Impression: R carotid stenosis    Recommendations/Plans: Right carotid endarterectomy. Need for surgery, risks, alternatives previously discussed in office. All questions answered. She understands and agrees to proceed. Desiree Bear MD    Elements of this note have been dictated using speech recognition software. As a result, errors of speech recognition may have occurred.

## 2017-05-15 NOTE — BRIEF OP NOTE
BRIEF OPERATIVE NOTE    Date of Procedure: 5/15/2017   Preoperative Diagnosis: Stenosis of right carotid artery [I65.21]  Postoperative Diagnosis: Stenosis of right carotid artery [I65.21]    Procedure(s):  RIGHT CAROTID ENDARTERECTOMY   Surgeon(s) and Role:     Marjorie Diop MD - Primary     * Reji Spears MD - Assisting         Assistant Staff:     Surgical Staff:  Circ-1: Luis Cronin RN  Circ-Relief: Clover Millan RN  Scrub Tech-1: Arlen Mora  Scrub Tech-2: José Hernandez  Scrub Tech-Relief: Suzanne Fisher  Event Time In   Incision Start 1420   Incision Close 1557     Anesthesia: General   Estimated Blood Loss: 50 mL  Specimens: * No specimens in log *   Findings: calcified, stenotic CELESTINA plaque. Normal completion Doppler signals. Complications: none  Implants:   Implant Name Type Inv.  Item Serial No.  Lot No. LRB No. Used [facility] VASC PERIPATCH [id number] -- [place] - [id number]   PATCH VASC PERIPATCH [id number] -- [employer] [id number] 1 Implanted

## 2017-05-16 VITALS
OXYGEN SATURATION: 97 % | WEIGHT: 165.06 LBS | DIASTOLIC BLOOD PRESSURE: 50 MMHG | SYSTOLIC BLOOD PRESSURE: 109 MMHG | BODY MASS INDEX: 29.25 KG/M2 | HEART RATE: 57 BPM | TEMPERATURE: 97.5 F | HEIGHT: 63 IN | RESPIRATION RATE: 17 BRPM

## 2017-05-16 LAB
HCT VFR BLD AUTO: 27.4 % (ref 35.8–46.3)
HGB BLD-MCNC: 8.8 G/DL (ref 11.7–15.4)
MAGNESIUM SERPL-MCNC: 2 MG/DL (ref 1.8–2.4)
POTASSIUM SERPL-SCNC: 4.5 MMOL/L (ref 3.5–5.1)

## 2017-05-16 PROCEDURE — 36600 WITHDRAWAL OF ARTERIAL BLOOD: CPT

## 2017-05-16 PROCEDURE — 85018 HEMOGLOBIN: CPT | Performed by: SURGERY

## 2017-05-16 PROCEDURE — 83735 ASSAY OF MAGNESIUM: CPT | Performed by: SURGERY

## 2017-05-16 PROCEDURE — 74011250636 HC RX REV CODE- 250/636: Performed by: SURGERY

## 2017-05-16 PROCEDURE — 84132 ASSAY OF SERUM POTASSIUM: CPT | Performed by: SURGERY

## 2017-05-16 PROCEDURE — 74011250637 HC RX REV CODE- 250/637: Performed by: SURGERY

## 2017-05-16 RX ADMIN — CEFAZOLIN 2 G: 1 INJECTION, POWDER, FOR SOLUTION INTRAMUSCULAR; INTRAVENOUS; PARENTERAL at 05:27

## 2017-05-16 RX ADMIN — ASPIRIN 81 MG: 81 TABLET, COATED ORAL at 07:18

## 2017-05-16 RX ADMIN — PANTOPRAZOLE SODIUM 40 MG: 40 TABLET, DELAYED RELEASE ORAL at 07:18

## 2017-05-16 RX ADMIN — ONDANSETRON 4 MG: 2 INJECTION INTRAMUSCULAR; INTRAVENOUS at 06:18

## 2017-05-16 RX ADMIN — Medication 10 ML: at 05:27

## 2017-05-16 RX ADMIN — DEXTROSE MONOHYDRATE, SODIUM CHLORIDE, AND POTASSIUM CHLORIDE 125 ML/HR: 50; 4.5; 1.49 INJECTION, SOLUTION INTRAVENOUS at 03:10

## 2017-05-16 NOTE — DISCHARGE INSTRUCTIONS
DISCHARGE SUMMARY from Nurse    The following personal items are in your possession at time of discharge:    Dental Appliances: Uppers, Lowers  Visual Aid: Glasses, At home     Home Medications: None  Jewelry: None  Clothing: Footwear, Pants, Shirt, Socks, Undergarments  Other Valuables: Purse             PATIENT INSTRUCTIONS:    After general anesthesia or intravenous sedation, for 24 hours or while taking prescription Narcotics:  · Limit your activities  · Do not drive and operate hazardous machinery  · Do not make important personal or business decisions  · Do  not drink alcoholic beverages  · If you have not urinated within 8 hours after discharge, please contact your surgeon on call. Report the following to your surgeon:  · Excessive pain, swelling, redness or odor of or around the surgical area  · Temperature over 100.5  · Nausea and vomiting lasting longer than 4 hours or if unable to take medications  · Any signs of decreased circulation or nerve impairment to extremity: change in color, persistent  numbness, tingling, coldness or increase pain  · Any questions        What to do at Home:  Recommended activity: Activity as tolerated      *  Please give a list of your current medications to your Primary Care Provider. *  Please update this list whenever your medications are discontinued, doses are      changed, or new medications (including over-the-counter products) are added. *  Please carry medication information at all times in case of emergency situations. These are general instructions for a healthy lifestyle:    No smoking/ No tobacco products/ Avoid exposure to second hand smoke    Surgeon General's Warning:  Quitting smoking now greatly reduces serious risk to your health.     Obesity, smoking, and sedentary lifestyle greatly increases your risk for illness    A healthy diet, regular physical exercise & weight monitoring are important for maintaining a healthy lifestyle    You may be retaining fluid if you have a history of heart failure or if you experience any of the following symptoms:  Weight gain of 3 pounds or more overnight or 5 pounds in a week, increased swelling in our hands or feet or shortness of breath while lying flat in bed. Please call your doctor as soon as you notice any of these symptoms; do not wait until your next office visit. Recognize signs and symptoms of STROKE:    F-face looks uneven    A-arms unable to move or move unevenly    S-speech slurred or non-existent    T-time-call 911 as soon as signs and symptoms begin-DO NOT go       Back to bed or wait to see if you get better-TIME IS BRAIN. Warning Signs of HEART ATTACK     Call 911 if you have these symptoms:   Chest discomfort. Most heart attacks involve discomfort in the center of the chest that lasts more than a few minutes, or that goes away and comes back. It can feel like uncomfortable pressure, squeezing, fullness, or pain.  Discomfort in other areas of the upper body. Symptoms can include pain or discomfort in one or both arms, the back, neck, jaw, or stomach.  Shortness of breath with or without chest discomfort.  Other signs may include breaking out in a cold sweat, nausea, or lightheadedness. Don't wait more than five minutes to call 911 - MINUTES MATTER! Fast action can save your life. Calling 911 is almost always the fastest way to get lifesaving treatment. Emergency Medical Services staff can begin treatment when they arrive -- up to an hour sooner than if someone gets to the hospital by car. The discharge information has been reviewed with the patient. The patient verbalized understanding. Discharge medications reviewed with the patient and appropriate educational materials and side effects teaching were provided.                Carotid Endarterectomy: What to Expect at [address] [place] [address]  A carotid endarterectomy (say \"kuh-RAW-tid zy-nsb-owf-REK-tuh-nola\") is surgery to remove fatty build-up (plaque) from one of the carotid arteries. There are two carotid arteries--one on each side of the neck--that supply blood to the brain. When plaque builds up in either artery, it can make it hard for blood to flow to the brain. This surgery may lower your risk of having a stroke. You may have a sore throat for a few days. You can expect the cut (incision) in your neck to be sore for about a week. The area around the incision may also be swollen and bruised at first. The area in front of the incision may be numb. This usually gets better after 6 to 12 months. Your doctor closed the incision in your neck with stitches. The stitches will be removed 7 to 10 days after surgery, or you may have stitches that dissolve on their own. You may feel more tired than usual for several weeks after surgery. You will probably be able to go back to work or your usual activities in 1 to 2 weeks. This care sheet gives you a general idea about how long it will take for you to recover. But each person recovers at a different pace. Follow the steps below to feel better as quickly as possible. How can you care for yourself at home? Activity  · Rest when you feel tired. Getting enough sleep will help you recover. · Try to walk each day. Start by walking a little more than you did the day before. Bit by bit, increase the amount you walk. Walking boosts blood flow and helps prevent pneumonia and constipation. · Avoid strenuous activities, such as bicycle riding, jogging, weight lifting, or aerobic exercise. Your doctor will tell you when it's okay to do strenuous activity. · For 1 to 2 weeks, avoid lifting anything that would make you strain. This may include a child, heavy grocery bags and milk containers, a heavy briefcase or backpack, cat litter or dog food bags, or a vacuum . · Ask your doctor when you can drive again. · You will probably need to take 1 to 2 weeks off from work.  It depends on the type of work you do and how you feel. · You may shower and take baths as usual. But do not soak the incision for the first 2 weeks, or until your doctor tells you it is okay. Pat the incision dry. · Your doctor will tell you when you can have sex again. Diet  · You can eat your normal diet. If your stomach is upset, try bland, low-fat foods like plain rice, broiled chicken, toast, and yogurt. · Drink plenty of fluids (unless your doctor tells you not to). · You may notice that your bowel movements are not regular right after your surgery. This is common. Try to avoid constipation and straining with bowel movements. You may want to take a fiber supplement every day. If you have not had a bowel movement after a couple of days, ask your doctor about taking a mild laxative. Medicines  · Your doctor will tell you if and when you can restart your medicines. He or she will also give you instructions about taking any new medicines. · If you take blood thinners, such as warfarin (Coumadin), clopidogrel (Plavix), or aspirin, be sure to talk to your doctor. He or she will tell you if and when to start taking those medicines again. Make sure that you understand exactly what your doctor wants you to do. · Your doctor may advise you to take aspirin when you go home. This helps prevent blood clots. Take your medicines exactly as prescribed. Call your doctor if you think you are having a problem with your medicine. · Be safe with medicines. Take pain medicines exactly as directed. ¨ If the doctor gave you a prescription medicine for pain, take it as prescribed. ¨ If you are not taking a prescription pain medicine, ask your doctor if you can take an over-the-counter medicine. ¨ Do not take two or more pain medicines at the same time unless the doctor told you to. Many pain medicines have acetaminophen, which is Tylenol. Too much acetaminophen (Tylenol) can be harmful.   · If you think your pain medicine is making you sick to your stomach:  ¨ Take your medicine after meals (unless your doctor has told you not to). ¨ Ask your doctor for a different pain medicine. · If your doctor prescribed antibiotics, take them as directed. Do not stop taking them just because you feel better. You need to take the full course of antibiotics. · If you take a blood thinner, such as aspirin, be sure you get instructions about how to take your medicine safely. Blood thinners can cause serious bleeding problems. Incision care  · If you have strips of tape over your incision, leave the tape on for a week or until it falls off. · Wash the area daily with water and pat it dry. Other cleaning products, such as hydrogen peroxide, can make the wound heal more slowly. You may cover the area with a gauze bandage if it weeps or rubs against clothing. Change the bandage every day. · Keep the area clean and dry. Follow-up care is a key part of your treatment and safety. Be sure to make and go to all appointments, and call your doctor if you are having problems. It's also a good idea to know your test results and keep a list of the medicines you take. When should you call for help? Call 911 anytime you think you may need emergency care. For example, call if:  · You passed out (lost consciousness). · You have severe trouble breathing. · You have a tight bulge in your neck on the side where the surgery was done. · You have symptoms of a stroke. These may include:  ¨ Sudden numbness, tingling, weakness, or loss of movement in your face, arm, or leg, especially on only one side of your body. ¨ Sudden vision changes. ¨ Sudden trouble speaking. ¨ Sudden confusion or trouble understanding simple statements. ¨ Sudden problems with walking or balance. ¨ A sudden, severe headache that is different from past headaches. · You have chest pain or pressure. This may occur with:  ¨ Sweating. ¨ Shortness of breath. ¨ Nausea or vomiting.   ¨ Pain that spreads from the chest to the neck, jaw, or one or both shoulders or arms. ¨ Dizziness or lightheadedness. ¨ A fast or uneven pulse. After calling 911, chew 1 adult-strength or 2 to 4 low-dose aspirin. Wait for an ambulance. Do not try to drive yourself. Call your doctor now or seek immediate medical care if:  · You are sick to your stomach or cannot keep fluids down. · You have pain that does not get better after you take pain medicine. · You have a fever over 100°F.  · You have loose stitches, or your incision comes open. · Bright red blood has soaked through the bandage over your incision. · You have signs of infection, such as:  ¨ Increased pain, swelling, warmth, or redness. ¨ Red streaks leading from the incisions. ¨ Pus draining from the incisions. ¨ Swollen lymph nodes in your neck, armpits, or groin. ¨ A fever. Watch closely for any changes in your health, and be sure to contact your doctor if you have any problems. Where can you learn more? Go to http://brent-marleny.info/. Enter O176 in the search box to learn more about \"Carotid Endarterectomy: What to Expect at Home. \"  Current as of: January 27, 2016  Content Version: 11.2  © 9673-5317 [employer]. Care instructions adapted under license by [employer] (which disclaims liability or warranty for this information). If you have questions about a medical condition or this instruction, always ask your healthcare professional. [place] [address] any warranty or liability for your use of this information. Stopping Smoking: Care Instructions  Your Care Instructions  Cigarette smokers crave the nicotine in cigarettes. Giving it up is much harder than simply changing a habit. Your body has to stop craving the nicotine. It is hard to quit, but you can do it. There are many tools that people use to quit smoking. You may find that combining tools works best for you. There are several steps to quitting. First you get ready to quit. Then you get support to help you. After that, you learn new skills and behaviors to become a nonsmoker. For many people, a necessary step is getting and using medicine. Your doctor will help you set up the plan that best meets your needs. You may want to attend a smoking cessation program to help you quit smoking. When you choose a program, look for one that has proven success. Ask your doctor for ideas. You will greatly increase your chances of success if you take medicine as well as get counseling or join a cessation program.  Some of the changes you feel when you first quit tobacco are uncomfortable. Your body will miss the nicotine at first, and you may feel short-tempered and grumpy. You may have trouble sleeping or concentrating. Medicine can help you deal with these symptoms. You may struggle with changing your smoking habits and rituals. The last step is the tricky one: Be prepared for the smoking urge to continue for a time. This is a lot to deal with, but keep at it. You will feel better. Follow-up care is a key part of your treatment and safety. Be sure to make and go to all appointments, and call your doctor if you are having problems. Its also a good idea to know your test results and keep a list of the medicines you take. How can you care for yourself at home? · Ask your family, friends, and coworkers for support. You have a better chance of quitting if you have help and support. · Join a support group, such as Nicotine Anonymous, for people who are trying to quit smoking. · Consider signing up for a smoking cessation program, such as the American Lung Association's Freedom from Smoking program.  · Set a quit date. Pick your date carefully so that it is not right in the middle of a big deadline or stressful time. Once you quit, do not even take a puff. Get rid of all ashtrays and lighters after your last cigarette.  Clean your house and your clothes so that they do not smell of smoke. · Learn how to be a nonsmoker. Think about ways you can avoid those things that make you reach for a cigarette. ¨ Avoid situations that put you at greatest risk for smoking. For some people, it is hard to have a drink with friends without smoking. For others, they might skip a coffee break with coworkers who smoke. ¨ Change your daily routine. Take a different route to work or eat a meal in a different place. · Cut down on stress. Calm yourself or release tension by doing an activity you enjoy, such as reading a book, taking a hot bath, or gardening. · Talk to your doctor or pharmacist about nicotine replacement therapy, which replaces the nicotine in your body. You still get nicotine but you do not use tobacco. Nicotine replacement products help you slowly reduce the amount of nicotine you need. These products come in several forms, many of them available over-the-counter:  ¨ Nicotine patches  ¨ Nicotine gum and lozenges  ¨ Nicotine inhaler  · Ask your doctor about bupropion (Wellbutrin) or varenicline (Chantix), which are prescription medicines. They do not contain nicotine. They help you by reducing withdrawal symptoms, such as stress and anxiety. · Some people find hypnosis, acupuncture, and massage helpful for ending the smoking habit. · Eat a healthy diet and get regular exercise. Having healthy habits will help your body move past its craving for nicotine. · Be prepared to keep trying. Most people are not successful the first few times they try to quit. Do not get mad at yourself if you smoke again. Make a list of things you learned and think about when you want to try again, such as next week, next month, or next year. Where can you learn more? Go to http://brent-marleny.info/. Enter R407 in the search box to learn more about \"Stopping Smoking: Care Instructions. \"  Current as of: May 26, 2016  Content Version: 11.2  © 3199-5479 [employer], [facility].  Care instructions adapted under license by [employer] (which disclaims liability or warranty for this information). If you have questions about a medical condition or this instruction, always ask your healthcare professional. [address] any warranty or liability for your use of this information. Heart-Healthy Diet: Care Instructions  Your Care Instructions    A heart-healthy diet has lots of vegetables, fruits, nuts, beans, and whole grains, and is low in salt. It limits foods that are high in saturated fat, such as meats, cheeses, and fried foods. It may be hard to change your diet, but even small changes can lower your risk of heart attack and heart disease. Follow-up care is a key part of your treatment and safety. Be sure to make and go to all appointments, and call your doctor if you are having problems. It's also a good idea to know your test results and keep a list of the medicines you take. How can you care for yourself at home? Watch your portions  · Learn what a serving is. A \"serving\" and a \"portion\" are not always the same thing. Make sure that you are not eating larger portions than are recommended. For example, a serving of pasta is ½ cup. A serving size of meat is 2 to 3 ounces. A 3-ounce serving is about the size of a deck of cards. Measure serving sizes until you are good at [place]" them. Keep in mind that restaurants often serve portions that are 2 or 3 times the size of one serving. · To keep your energy level up and keep you from feeling hungry, eat often but in smaller portions. · Eat only the number of calories you need to stay at a healthy weight. If you need to lose weight, eat fewer calories than your body burns (through exercise and other physical activity). Eat more fruits and vegetables  · Eat a variety of fruit and vegetables every day. Dark green, deep orange, red, or yellow fruits and vegetables are especially good for you.  Examples include spinach, carrots, peaches, and berries. · Keep carrots, celery, and other veggies handy for snacks. Buy fruit that is in season and store it where you can see it so that you will be tempted to eat it. · Cook dishes that have a lot of veggies in them, such as stir-fries and soups. Limit saturated and trans fat  · Read food labels, and try to avoid saturated and trans fats. They increase your risk of heart disease. Trans fat is found in many processed foods such as cookies and crackers. · Use olive or canola oil when you cook. Try cholesterol-lowering spreads, such as Benecol or Take Control. · Bake, broil, grill, or steam foods instead of frying them. · Choose lean meats instead of high-fat meats such as hot dogs and sausages. Cut off all visible fat when you prepare meat. · Eat fish, skinless poultry, and meat alternatives such as soy products instead of high-fat meats. Soy products, such as tofu, may be especially good for your heart. · Choose low-fat or fat-free milk and dairy products. Eat fish  · Eat at least two servings of fish a week. Certain fish, such as salmon and tuna, contain omega-3 fatty acids, which may help reduce your risk of heart attack. Eat foods high in fiber  · Eat a variety of grain products every day. Include whole-grain foods that have lots of fiber and nutrients. Examples of whole-grain foods include oats, whole wheat bread, and brown rice. · Buy whole-grain breads and cereals, instead of white bread or pastries. Limit salt and sodium  · Limit how much salt and sodium you eat to help lower your blood pressure. · Taste food before you salt it. Add only a little salt when you think you need it. With time, your taste buds will adjust to less salt. · Eat fewer snack items, fast foods, and other high-salt, processed foods. Check food labels for the amount of sodium in packaged foods.   · Choose low-sodium versions of canned goods (such as soups, vegetables, and beans). Limit sugar  · Limit drinks and foods with added sugar. These include candy, desserts, and soda pop. Limit alcohol  · Limit alcohol to no more than 2 drinks a day for men and 1 drink a day for women. Too much alcohol can cause health problems. When should you call for help? Watch closely for changes in your health, and be sure to contact your doctor if:  · You would like help planning heart-healthy meals. Where can you learn more? Go to http://brent-marleny.info/. Enter V137 in the search box to learn more about \"Heart-Healthy Diet: Care Instructions. \"  Current as of: January 27, 2016  Content Version: 11.2  © 6700-5782 [employer]. Care instructions adapted under license by [employer] (which disclaims liability or warranty for this information). If you have questions about a medical condition or this instruction, always ask your healthcare professional. [place] [address] any warranty or liability for your use of this information. Pain Medicine Side Effects: Care Instructions  Your Care Instructions  When you go to a medical facility in pain, you may get a strong medicine to give you relief. The medicine may be given in a vein (by IV) or as an injection (shot). Examples of this type of pain medicine include fentanyl, hydromorphone, and morphine. While these medicines help relieve pain, they also have side effects. For your safety, it's important that you know how this strong pain medicine affects you. Common side effects can include:  · Nausea or vomiting. · Feeling dizzy or lightheaded. · Feeling sleepy. The doctor has checked you carefully, but problems can develop later. If you notice any problems or new symptoms, get medical treatment right away. Follow-up care is a key part of your treatment and safety. Be sure to make and go to all appointments, and call your doctor if you are having problems.  It's also a good idea to know your test results and keep a list of the medicines you take. How can you care for yourself at home? Activity  · Don't do anything for 24 hours that requires attention to detail. This medicine makes your mind foggy. It takes time for the effects to wear off completely. · Don't drive a car until you are sure the effects from the medicine are gone. Medicines  · Be safe with medicines. Read and follow all instructions on the label. ¨ If the doctor gave you a prescription medicine for pain, take it as prescribed. ¨ If you are not taking a prescription pain medicine, ask your doctor if you can take an over-the-counter medicine. Diet  · You can eat your normal diet, unless your doctor gives you other instructions. If your stomach is upset, try clear liquids and bland, low-fat foods like plain toast or rice. · Drink plenty of fluids (unless your doctor tells you not to). · Don't drink alcohol for 24 hours. When should you call for help? Call 911 anytime you think you may need emergency care. For example, call if:  · You have trouble breathing. · You passed out (lost consciousness). Call your doctor now or seek immediate medical care if:  · You have new or worse pain. Watch closely for changes in your health, and be sure to contact your doctor if:  · You do not get better as expected. Where can you learn more? Go to http://brent-marleny.info/. Enter G910 in the search box to learn more about \"Pain Medicine Side Effects: Care Instructions. \"  Current as of: October 14, 2016  Content Version: 11.2  © 7280-2077 [employer]. Care instructions adapted under license by [employer] (which disclaims liability or warranty for this information). If you have questions about a medical condition or this instruction, always ask your healthcare professional. [address] any warranty or liability for your use of this information.            Pain Medicine: Care Instructions  Your Care Instructions  Pain can keep you from doing the things you want to do. Medicine may help you feel better. There are many kinds of pain medicine. One type you can buy over the counter is acetaminophen (Tylenol). Other medicines help both pain and swelling. These are called nonsteroidal anti-inflammatory drugs (NSAIDs). They include aspirin, ibuprofen (Advil, Motrin), and naproxen (Aleve). All of these drugs can cause side effects. Take them just as the package label tells you to. The most common side effects are stomach upset, heartburn, and nausea. Taking these drugs with food may help. If you take NSAIDs often, you could get stomach ulcers or kidney problems. This can also happen if you take them for a long time. NSAIDs rarely cause a bad allergic reaction. Many pain medicines need to be prescribed by a doctor. Some of these drugs, called opioids, can be addicting. Examples are hydrocodone, morphine, fentanyl, and codeine. They often can be used safely if you are under a doctor's care. Follow-up care is a key part of your treatment and safety. Be sure to make and go to all appointments, and call your doctor if you are having problems. It's also a good idea to know your test results and keep a list of the medicines you take. How can you care for yourself at home? · Be safe with medicines. If you take an over-the-counter pain medicine, such as acetaminophen (Tylenol), ibuprofen (Advil, Motrin), or naproxen (Aleve), read and follow all instructions on the label. · Do not give aspirin to anyone younger than 20. It has been linked to Reye syndrome, a serious illness. · Be careful when taking over-the-counter cold or flu medicines and Tylenol at the same time. Many of these medicines contain acetaminophen, which is Tylenol. Read the labels to make sure that you are not taking more than the recommended dose. Too much Tylenol can be harmful.   · Do not take two or more pain medicines at the same time unless the doctor told you to. · Do not drink alcohol and take pain medicine at the same time. · If your pain pills make you constipated:  ¨ Talk to your doctor about a laxative. ¨ Drink plenty of fluids, enough so that your urine is light yellow or clear like water. Drink water, fruit juice, or other drinks that do not contain caffeine or alcohol. If you have kidney, heart, or liver disease and have to limit fluids, talk with your doctor before you increase the amount of fluids you drink. ¨ Take fiber, such as Citrucel or Metamucil, daily if needed. Read and follow all instructions on the label. If you take pain medicine for more than a few days, talk to your doctor before you take fiber. When should you call for help? Call your doctor now or seek immediate medical care if:  · Your pain medicine is not easing your pain. · You have stomach pain, an upset stomach, or heartburn that lasts or comes back. · You can't sleep because of the pain. Watch closely for changes in your health, and be sure to contact your doctor if:  · You do not get better as expected. Where can you learn more? Go to http://brent-marleny.info/. Enter 68 [id number] [phone] in the search box to learn more about \"Pain Medicine: Care Instructions. \"  Current as of: November 15, 2016  Content Version: 11.2  © 9184-1269 [employer]. Care instructions adapted under license by [employer] (which disclaims liability or warranty for this information). If you have questions about a medical condition or this instruction, always ask your healthcare professional. [place] [address] any warranty or liability for your use of this information.

## 2017-05-16 NOTE — PROGRESS NOTES
POD #1  No c/o except mild nausea  Blood pressure 106/48, pulse 61, temperature 97.5 °F (36.4 °C), resp. rate 19, height 5' 3\" (1.6 m), weight 165 lb 1 oz (74.9 kg), SpO2 97 %. Neuro intact  CV- RRR  A/P: Doing well. DC home.

## 2017-05-16 NOTE — PROGRESS NOTES
Discharge instructions given to patient. All questions answered. Patient stable. Left neck incision  Clean, dry and intact. No drainage or hematoma present. Awaiting ride home via .

## 2017-05-16 NOTE — OP NOTES
[facility]   OPERATIVE REPORT       Name:  Sharmaine Ayala   MR#:  [id number]   :  1945   Account #:  [de-identified]   Date of Adm:  05/15/2017       DATE OF SURGERY: 05/15/2017    PREOPERATIVE DIAGNOSIS: Right internal carotid artery stenosis. POSTOPERATIVE DIAGNOSIS: Right internal carotid artery stenosis. PROCEDURE: Right carotid endarterectomy. SURGEON: Martina Gomez MD.    ASSISTANT: Betsy Armenta MD.    ANESTHESIA: General with local supplement. ESTIMATED BLOOD LOSS: 50 mL. SPECIMEN: None. STATEMENT OF MEDICAL NECESSITY: The patient is a 69-year-old   woman who presented with bilateral carotid stenosis that had   progressed to greater than 70% bilaterally. She has already   undergone a left carotid endarterectomy and now presents for the   staged right carotid endarterectomy. She has fully recovered   from her previous left carotid endarterectomy. DESCRIPTION OF PROCEDURE: The patient was taken to the operating   room and general anesthetic was administered. The right side of   the neck and upper chest were prepped and draped in the usual   sterile fashion. Thyra Cellar covers were used. The skin was   anesthetized along the anterior border of the   sternocleidomastoid muscle and a standard longitudinal incision   was made. The carotid bifurcation was exposed. The common facial   vein was carefully dissected out and then divided between double   ligatures of 2-0 silk. The common external and internal carotid   arteries were carefully dissected and the vagus and hypoglossal   nerves were identified and preserved. Umbilical tapes were   applied to the internal, external, and common carotid arteries. Heparin 100 units per kilogram was given intravenously and then   2 minutes later, the internal and then the external and then the   common carotid arteries were occluded in that order.  A   longitudinal arteriotomy was made in the common carotid artery,   extended to the heavily calcified stenotic plaque at the right   carotid bifurcation and then extended into the normal mid to   distal right internal carotid artery. The plaque was noted to be   severely stenotic and heavily calcified. A 10-Iceland[place] Sidney   shunt was then gently inserted into the internal carotid artery,   back bled and then the proximal end was placed in the common   carotid artery. Patency of the shunt was confirmed with   continuous wave Doppler. The endarterectomy plane was   established in the deep layer of arterial wall media using an   elevator. The endarterectomy was then carried proximally to the   common carotid artery and the endpoint there was cut flush with   the common carotid artery intima. The plaque was then elevated   toward the external carotid artery, which was then cleared with   a standard eversion technique. The distal endpoint feathered off   nicely and 3 tacking sutures of 6-0 Prolene were used there. 2   tacking sutures of 6-0 Prolene were used on the proximal   endpoint as well. All loose shards of atheromatous debris were carefully removed   with microforceps and the surface of the endarterectomy was   thoroughly irrigated with heparinized saline to ensure   completeness. The arteriotomy was then closed with a standard bovine   pericardium patch anastomosed with 2 running 6-0 Prolene   sutures. Before completing the anastomosis, the shunt was   divided and then removed from the internal and then the common   carotid artery. The external was then released to back bleed the   bifurcation and the surface of the endarterectomy was thoroughly   irrigated with heparinized saline. As the patch closure was   being completed, the Rumel was released again from the external   to backbleed the bifurcation and then once the anastomosis was   completed, the Benitez was released from the common carotid   artery to initially reperfuse the external carotid artery.    Several heartbeats later, the Rumel was released from the   internal carotid artery. At completion, there was a normal   external appearance, a normal palpable pulse, and a normal   continuous wave Doppler signal at the internal, external and   common carotid arteries including where the Rumels had been   applied. Protamine was then given to reverse the remaining   heparin. The incision was thoroughly irrigated. A small amount   of fibrillar was used to assist with topical hemostasis. The incision was then closed in layers with running 3-0 Vicryl   in the carotid sheath, running 3-0 Vicryl in the platysma, and   running 4-0 subcuticular Monocryl in the skin. Dermabond was   applied to the skin incision. The patient tolerated the   procedure well and went to recovery in stable condition.         Lois Rosenberg MD      [facility] / [place]   D:  05/15/2017   16:21   T:  05/16/2017   10:49   Job #:  [id number]

## 2017-05-16 NOTE — PROGRESS NOTES
Patient's right radial art line removed at this time. Pressure held to achieve hemostasis. Will continue to monitor. Jimenez removed at this time. No difficulty noted .   Will monitor closely

## 2017-05-17 ENCOUNTER — PATIENT OUTREACH (OUTPATIENT)
Dept: CASE MANAGEMENT | Age: 72
End: 2017-05-17

## 2017-05-17 NOTE — PROGRESS NOTES
This note will not be viewable in [address] Transition of Care Discharge Follow-Up Questionnaire         Date/Time of Call:   May 17, 2017 3:37PM   What was the patient hospitalized for? Patient hospitalized for Carotid Stenosis, right           Does the patient understand his/her diagnosis and/or treatment and what happened during the hospitalization? Patient states understanding of diagnosis and treatment during hospitalization. Did the patient receive discharge instructions? Patient states discharge instructions explained and received before discharge to home. Review any discharge instructions (see notes in [facility]). Ask patient if they understand these. Do they have any questions? Patient states no questions regarding discharge instructions. Were home services ordered (nursing, PT, OT, ST, etc.)? No home health services ordered       If so, has the first visit occurred? If not, why? (Assist with coordination of services if necessary. ) NA         Was any DME ordered? No durable medical equipment ordered. If so, has it been received? If not, why?  (Assist with coordination of arranging DME orders if necessary. ) NA         Complete a review of all medications (new, continued and discontinued meds per the D/C instructions and medication tab in [facility]). Care Coordinator reviewed all medications with patient per [facility], no new medications prescribed. Were all new prescriptions filled? If not, why?  (Assist with obtainment of medications if necessary. ) NA         Does the patient understand the purpose and dosing instructions for all medications? (If patient has questions, provide explanation and education.) Patient states understanding of medication purposes and dosing instructions. Does the patient have any problems in performing ADLs? (If patient is unable to perform ADLs  what is the limiting factor(s)?   Do they have a support system that can assist? If no support system is present, discuss possible assistance that they may be able to obtain.) Patient states she is independent with ADL's and ambulation. Patient states she is doing good and improving daily. Does the patient have all follow-up appointments scheduled? Has transportation been arranged? [facility] follow-up should be within 7 days of discharge; all others should have PCP follow-up within 7 days of discharge; follow-ups with other specialists as appropriate or ordered.) Patient states follow up appointment scheduled with [facility], May 24, 2017 @ 10:00AM with Dr. John Huerta. Care Coordinator advised patient to schedule hospital follow up with PCP. Patient states she will call and schedule appointment this week. Patient has no transportation needs. Any other questions or concerns expressed by the patient? Patient states no questions or concerns. Schedule next appointment with ABHISHEK ANTONIO Coordinator or refer to RN Case Manager/  as appropriate. No further needs identified, patient instructed to call Care Coordinator if further questions or concerns arise.    TIMUR Call Completed By: Asha Lane LPN  Care Coordinator   [facility] ACJOS[facility] MIGUEL

## 2017-06-02 ENCOUNTER — PATIENT OUTREACH (OUTPATIENT)
Dept: CASE MANAGEMENT | Age: 72
End: 2017-06-02

## 2017-06-02 NOTE — PROGRESS NOTES
Follow Up Call- Care Coordinator spoke with patient Mrs. Zepeda who states she is doing well at this time, patient states she has been seen by Vascular Surgeon 05/24/2017. Care Coordinator advised patient to follow up with PCP, patient states she will schedule appointment with PCP soon. Mrs. Zepeda states she has no additional needs, questions or concerns. This note will not be viewable in [address]

## 2017-06-15 ENCOUNTER — PATIENT OUTREACH (OUTPATIENT)
Dept: CASE MANAGEMENT | Age: 72
End: 2017-06-15

## 2017-06-15 NOTE — PROGRESS NOTES
Follow Up Call- Care Coordinator spoke with patient Mrs. Zepeda who states she is doing well, patient states hospital follow up has occurred with Dr. Susanna Izaguirre (Vascular Surgeon). Care Coordinator once again advised patient to schedule follow up with PCP and stressed the importance of follow up with PCP. Mr. Sunita Chiu states she will schedule an appointment with PCP very soon. Mrs. Zepeda states she has no questions or concerns. This note will not be viewable in [address]

## 2018-11-08 ENCOUNTER — HOSPITAL ENCOUNTER (OUTPATIENT)
Dept: MAMMOGRAPHY | Age: 73
Discharge: HOME | End: 2018-11-08
Attending: INTERNAL MEDICINE
Payer: MEDICARE

## 2018-11-08 DIAGNOSIS — Z12.31 VISIT FOR SCREENING MAMMOGRAM: ICD-10-CM

## 2018-11-08 PROCEDURE — 77063 BREAST TOMOSYNTHESIS BI: CPT

## 2020-10-15 PROBLEM — I35.0 NONRHEUMATIC AORTIC VALVE STENOSIS: Status: ACTIVE | Noted: 2020-10-15

## 2020-11-17 PROCEDURE — 88305 TISSUE EXAM BY PATHOLOGIST: CPT

## 2020-11-18 ENCOUNTER — HOSPITAL ENCOUNTER (OUTPATIENT)
Dept: LAB | Age: 75
Discharge: HOME | End: 2020-11-18

## 2020-11-29 ENCOUNTER — APPOINTMENT (OUTPATIENT)
Dept: CT IMAGING | Age: 75
DRG: 233 | End: 2020-11-29
Attending: EMERGENCY MEDICINE
Payer: MEDICARE

## 2020-11-29 ENCOUNTER — APPOINTMENT (OUTPATIENT)
Dept: GENERAL RADIOLOGY | Age: 75
DRG: 233 | End: 2020-11-29
Attending: EMERGENCY MEDICINE
Payer: MEDICARE

## 2020-11-29 ENCOUNTER — HOSPITAL ENCOUNTER (EMERGENCY)
Age: 75
Discharge: ANOTHER HOSPITAL | DRG: 233 | End: 2020-11-29
Attending: EMERGENCY MEDICINE
Payer: MEDICARE

## 2020-11-29 ENCOUNTER — HOSPITAL ENCOUNTER (INPATIENT)
Age: 75
LOS: 11 days | Discharge: HOME HEALTH | DRG: 233 | End: 2020-12-10
Attending: INTERNAL MEDICINE | Admitting: INTERNAL MEDICINE
Payer: MEDICARE

## 2020-11-29 VITALS
TEMPERATURE: 98.5 F | SYSTOLIC BLOOD PRESSURE: 157 MMHG | OXYGEN SATURATION: 98 % | RESPIRATION RATE: 24 BRPM | WEIGHT: 159 LBS | DIASTOLIC BLOOD PRESSURE: 70 MMHG | HEART RATE: 79 BPM | HEIGHT: 64 IN | BODY MASS INDEX: 27.14 KG/M2

## 2020-11-29 DIAGNOSIS — I35.0 NONRHEUMATIC AORTIC VALVE STENOSIS: Chronic | ICD-10-CM

## 2020-11-29 DIAGNOSIS — R07.2 PRECORDIAL PAIN: ICD-10-CM

## 2020-11-29 DIAGNOSIS — R09.02 HYPOXIA: ICD-10-CM

## 2020-11-29 DIAGNOSIS — I10 ESSENTIAL HYPERTENSION: Chronic | ICD-10-CM

## 2020-11-29 DIAGNOSIS — R55 SYNCOPE AND COLLAPSE: ICD-10-CM

## 2020-11-29 DIAGNOSIS — E78.5 DYSLIPIDEMIA: Chronic | ICD-10-CM

## 2020-11-29 DIAGNOSIS — I25.119 ATHEROSCLEROSIS OF NATIVE CORONARY ARTERY OF NATIVE HEART WITH ANGINA PECTORIS (HCC): Chronic | ICD-10-CM

## 2020-11-29 DIAGNOSIS — I21.4 NSTEMI (NON-ST ELEVATED MYOCARDIAL INFARCTION) (HCC): Primary | ICD-10-CM

## 2020-11-29 DIAGNOSIS — Z95.1 S/P CABG X 3: ICD-10-CM

## 2020-11-29 DIAGNOSIS — I21.4 NSTEMI (NON-ST ELEVATED MYOCARDIAL INFARCTION) (HCC): ICD-10-CM

## 2020-11-29 DIAGNOSIS — N18.32 STAGE 3B CHRONIC KIDNEY DISEASE (HCC): Chronic | ICD-10-CM

## 2020-11-29 DIAGNOSIS — Z99.11 ENCOUNTER FOR WEANING FROM VENTILATOR (HCC): ICD-10-CM

## 2020-11-29 DIAGNOSIS — D50.0 IRON DEFICIENCY ANEMIA DUE TO CHRONIC BLOOD LOSS: Chronic | ICD-10-CM

## 2020-11-29 PROBLEM — E83.42 HYPOMAGNESEMIA: Status: ACTIVE | Noted: 2020-11-29

## 2020-11-29 PROBLEM — D50.9 IRON DEFICIENCY ANEMIA: Chronic | Status: ACTIVE | Noted: 2017-03-21

## 2020-11-29 PROBLEM — R07.9 CHEST PAIN: Status: ACTIVE | Noted: 2020-11-29

## 2020-11-29 PROBLEM — I25.10 CORONARY ATHEROSCLEROSIS OF NATIVE CORONARY VESSEL: Chronic | Status: ACTIVE | Noted: 2017-03-21

## 2020-11-29 LAB
ALBUMIN SERPL-MCNC: 3.9 G/DL (ref 3.2–4.6)
ALBUMIN/GLOB SERPL: 1 {RATIO} (ref 1.2–3.5)
ALP SERPL-CCNC: 147 U/L (ref 50–130)
ALT SERPL-CCNC: 28 U/L (ref 12–65)
ANION GAP SERPL CALC-SCNC: 9 MMOL/L (ref 7–16)
AST SERPL-CCNC: 20 U/L (ref 15–37)
BACTERIA URNS QL MICRO: 0 /HPF
BASOPHILS # BLD: 0 K/UL (ref 0–0.2)
BASOPHILS NFR BLD: 0 % (ref 0–2)
BILIRUB SERPL-MCNC: 0.4 MG/DL (ref 0.2–1.1)
BUN SERPL-MCNC: 24 MG/DL (ref 8–23)
CALCIUM SERPL-MCNC: 9.6 MG/DL (ref 8.3–10.4)
CASTS URNS QL MICRO: NORMAL /LPF
CHLORIDE SERPL-SCNC: 105 MMOL/L (ref 98–107)
CO2 SERPL-SCNC: 24 MMOL/L (ref 21–32)
CREAT SERPL-MCNC: 1.2 MG/DL (ref 0.6–1)
DIFFERENTIAL METHOD BLD: ABNORMAL
EOSINOPHIL # BLD: 0.1 K/UL (ref 0–0.8)
EOSINOPHIL NFR BLD: 1 % (ref 0.5–7.8)
EPI CELLS #/AREA URNS HPF: NORMAL /HPF
ERYTHROCYTE [DISTWIDTH] IN BLOOD BY AUTOMATED COUNT: 15.1 % (ref 11.9–14.6)
GLOBULIN SER CALC-MCNC: 4.1 G/DL (ref 2.3–3.5)
GLUCOSE BLD STRIP.AUTO-MCNC: 198 MG/DL (ref 65–100)
GLUCOSE SERPL-MCNC: 145 MG/DL (ref 65–100)
HCT VFR BLD AUTO: 28.3 % (ref 35.8–46.3)
HGB BLD-MCNC: 8.7 G/DL (ref 11.7–15.4)
IMM GRANULOCYTES # BLD AUTO: 0 K/UL (ref 0–0.5)
IMM GRANULOCYTES NFR BLD AUTO: 1 % (ref 0–5)
LYMPHOCYTES # BLD: 1 K/UL (ref 0.5–4.6)
LYMPHOCYTES NFR BLD: 13 % (ref 13–44)
MAGNESIUM SERPL-MCNC: 1.5 MG/DL (ref 1.8–2.4)
MCH RBC QN AUTO: 27.7 PG (ref 26.1–32.9)
MCHC RBC AUTO-ENTMCNC: 30.7 G/DL (ref 31.4–35)
MCV RBC AUTO: 90.1 FL (ref 79.6–97.8)
MONOCYTES # BLD: 0.5 K/UL (ref 0.1–1.3)
MONOCYTES NFR BLD: 7 % (ref 4–12)
NEUTS SEG # BLD: 6 K/UL (ref 1.7–8.2)
NEUTS SEG NFR BLD: 78 % (ref 43–78)
NRBC # BLD: 0 K/UL (ref 0–0.2)
PLATELET # BLD AUTO: 221 K/UL (ref 150–450)
PMV BLD AUTO: 9.9 FL (ref 9.4–12.3)
POTASSIUM SERPL-SCNC: 3.5 MMOL/L (ref 3.5–5.1)
PROT SERPL-MCNC: 8 G/DL (ref 6.3–8.2)
RBC # BLD AUTO: 3.14 M/UL (ref 4.05–5.2)
RBC #/AREA URNS HPF: NORMAL /HPF
SODIUM SERPL-SCNC: 138 MMOL/L (ref 136–145)
TROPONIN-HIGH SENSITIVITY: 167.3 PG/ML (ref 0–14)
TROPONIN-HIGH SENSITIVITY: 261.8 PG/ML (ref 0–14)
WBC # BLD AUTO: 7.6 K/UL (ref 4.3–11.1)
WBC URNS QL MICRO: NORMAL /HPF

## 2020-11-29 PROCEDURE — 74011250636 HC RX REV CODE- 250/636: Performed by: NURSE PRACTITIONER

## 2020-11-29 PROCEDURE — 65660000000 HC RM CCU STEPDOWN

## 2020-11-29 PROCEDURE — 86923 COMPATIBILITY TEST ELECTRIC: CPT

## 2020-11-29 PROCEDURE — 71045 X-RAY EXAM CHEST 1 VIEW: CPT

## 2020-11-29 PROCEDURE — 85025 COMPLETE CBC W/AUTO DIFF WBC: CPT

## 2020-11-29 PROCEDURE — 74011000250 HC RX REV CODE- 250: Performed by: EMERGENCY MEDICINE

## 2020-11-29 PROCEDURE — 93005 ELECTROCARDIOGRAM TRACING: CPT | Performed by: EMERGENCY MEDICINE

## 2020-11-29 PROCEDURE — 2709999900 HC NON-CHARGEABLE SUPPLY

## 2020-11-29 PROCEDURE — 74011250637 HC RX REV CODE- 250/637: Performed by: EMERGENCY MEDICINE

## 2020-11-29 PROCEDURE — 72170 X-RAY EXAM OF PELVIS: CPT

## 2020-11-29 PROCEDURE — 96374 THER/PROPH/DIAG INJ IV PUSH: CPT

## 2020-11-29 PROCEDURE — 84484 ASSAY OF TROPONIN QUANT: CPT

## 2020-11-29 PROCEDURE — 86900 BLOOD TYPING SEROLOGIC ABO: CPT

## 2020-11-29 PROCEDURE — 83735 ASSAY OF MAGNESIUM: CPT

## 2020-11-29 PROCEDURE — 30233N1 TRANSFUSION OF NONAUTOLOGOUS RED BLOOD CELLS INTO PERIPHERAL VEIN, PERCUTANEOUS APPROACH: ICD-10-PCS | Performed by: INTERNAL MEDICINE

## 2020-11-29 PROCEDURE — 36415 COLL VENOUS BLD VENIPUNCTURE: CPT

## 2020-11-29 PROCEDURE — 74011250637 HC RX REV CODE- 250/637: Performed by: NURSE PRACTITIONER

## 2020-11-29 PROCEDURE — 99285 EMERGENCY DEPT VISIT HI MDM: CPT

## 2020-11-29 PROCEDURE — 81003 URINALYSIS AUTO W/O SCOPE: CPT

## 2020-11-29 PROCEDURE — 81015 MICROSCOPIC EXAM OF URINE: CPT

## 2020-11-29 PROCEDURE — 70450 CT HEAD/BRAIN W/O DYE: CPT

## 2020-11-29 PROCEDURE — 82962 GLUCOSE BLOOD TEST: CPT

## 2020-11-29 PROCEDURE — 80053 COMPREHEN METABOLIC PANEL: CPT

## 2020-11-29 RX ORDER — PANTOPRAZOLE SODIUM 40 MG/1
40 TABLET, DELAYED RELEASE ORAL
Status: DISCONTINUED | OUTPATIENT
Start: 2020-11-30 | End: 2020-12-10 | Stop reason: HOSPADM

## 2020-11-29 RX ORDER — MAGNESIUM SULFATE HEPTAHYDRATE 40 MG/ML
2 INJECTION, SOLUTION INTRAVENOUS ONCE
Status: COMPLETED | OUTPATIENT
Start: 2020-11-29 | End: 2020-11-30

## 2020-11-29 RX ORDER — MAG HYDROX/ALUMINUM HYD/SIMETH 200-200-20
30 SUSPENSION, ORAL (FINAL DOSE FORM) ORAL
Status: DISCONTINUED | OUTPATIENT
Start: 2020-11-29 | End: 2020-12-05

## 2020-11-29 RX ORDER — NALOXONE HYDROCHLORIDE 0.4 MG/ML
0.4 INJECTION, SOLUTION INTRAMUSCULAR; INTRAVENOUS; SUBCUTANEOUS AS NEEDED
Status: DISCONTINUED | OUTPATIENT
Start: 2020-11-29 | End: 2020-12-05

## 2020-11-29 RX ORDER — ASPIRIN 81 MG/1
81 TABLET ORAL
Status: DISCONTINUED | OUTPATIENT
Start: 2020-11-30 | End: 2020-12-04 | Stop reason: SDUPTHER

## 2020-11-29 RX ORDER — ONDANSETRON 2 MG/ML
4 INJECTION INTRAMUSCULAR; INTRAVENOUS
Status: DISCONTINUED | OUTPATIENT
Start: 2020-11-29 | End: 2020-12-05

## 2020-11-29 RX ORDER — LABETALOL HYDROCHLORIDE 5 MG/ML
20 INJECTION, SOLUTION INTRAVENOUS ONCE
Status: COMPLETED | OUTPATIENT
Start: 2020-11-29 | End: 2020-11-29

## 2020-11-29 RX ORDER — SODIUM CHLORIDE 9 MG/ML
75 INJECTION, SOLUTION INTRAVENOUS CONTINUOUS
Status: DISCONTINUED | OUTPATIENT
Start: 2020-11-30 | End: 2020-12-01

## 2020-11-29 RX ORDER — NITROGLYCERIN 0.4 MG/1
0.4 TABLET SUBLINGUAL
Status: DISCONTINUED | OUTPATIENT
Start: 2020-11-29 | End: 2020-12-05

## 2020-11-29 RX ORDER — SODIUM CHLORIDE 0.9 % (FLUSH) 0.9 %
5-40 SYRINGE (ML) INJECTION AS NEEDED
Status: DISCONTINUED | OUTPATIENT
Start: 2020-11-29 | End: 2020-12-05

## 2020-11-29 RX ORDER — ASPIRIN 325 MG
325 TABLET ORAL
Status: COMPLETED | OUTPATIENT
Start: 2020-11-29 | End: 2020-11-29

## 2020-11-29 RX ORDER — MORPHINE SULFATE 2 MG/ML
2 INJECTION, SOLUTION INTRAMUSCULAR; INTRAVENOUS
Status: DISCONTINUED | OUTPATIENT
Start: 2020-11-29 | End: 2020-12-04 | Stop reason: SDUPTHER

## 2020-11-29 RX ORDER — DIPHENHYDRAMINE HCL 25 MG
25 CAPSULE ORAL
Status: DISCONTINUED | OUTPATIENT
Start: 2020-11-29 | End: 2020-12-05

## 2020-11-29 RX ORDER — DOCUSATE SODIUM 100 MG/1
100 CAPSULE, LIQUID FILLED ORAL
Status: DISCONTINUED | OUTPATIENT
Start: 2020-11-29 | End: 2020-12-05

## 2020-11-29 RX ORDER — ACETAMINOPHEN 325 MG/1
325 TABLET ORAL
Status: DISCONTINUED | OUTPATIENT
Start: 2020-11-29 | End: 2020-12-05

## 2020-11-29 RX ORDER — ROSUVASTATIN CALCIUM 10 MG/1
5 TABLET, COATED ORAL
Status: DISCONTINUED | OUTPATIENT
Start: 2020-11-29 | End: 2020-12-01

## 2020-11-29 RX ORDER — LISINOPRIL 20 MG/1
20 TABLET ORAL DAILY
Status: DISCONTINUED | OUTPATIENT
Start: 2020-11-30 | End: 2020-12-04

## 2020-11-29 RX ORDER — SODIUM CHLORIDE 0.9 % (FLUSH) 0.9 %
5-40 SYRINGE (ML) INJECTION EVERY 8 HOURS
Status: DISCONTINUED | OUTPATIENT
Start: 2020-11-29 | End: 2020-12-05

## 2020-11-29 RX ADMIN — LABETALOL HYDROCHLORIDE 20 MG: 5 INJECTION INTRAVENOUS at 17:08

## 2020-11-29 RX ADMIN — ASPIRIN 325 MG: 325 TABLET, FILM COATED ORAL at 19:04

## 2020-11-29 RX ADMIN — Medication 5 ML: at 23:48

## 2020-11-29 RX ADMIN — SODIUM CHLORIDE 75 ML/HR: 900 INJECTION, SOLUTION INTRAVENOUS at 23:54

## 2020-11-29 RX ADMIN — NITROGLYCERIN 1 INCH: 20 OINTMENT TOPICAL at 23:41

## 2020-11-29 RX ADMIN — MAGNESIUM SULFATE HEPTAHYDRATE 2 G: 40 INJECTION, SOLUTION INTRAVENOUS at 23:40

## 2020-11-29 RX ADMIN — ROSUVASTATIN 5 MG: 10 TABLET, FILM COATED ORAL at 23:40

## 2020-11-29 NOTE — ED TRIAGE NOTES
Pt reports she had syncopal episode while walking to the bathroom. Pt reports history of iron/blood transfusion last week. Pt of Dr. Charly Light. Pt reports chest pain and shortness of breath with exertion.     Ruben Bañuelos RN

## 2020-11-29 NOTE — ED PROVIDER NOTES
[facility]       HPI:    44-year-old female history of anemia, hypertension, mild aortic stenosis presents emergency department with complaint of chest pain and syncope. Patient has had 2 Venofer transfusions and blood transfusions over the past 2 weeks. Additionally she has had a GI evaluation for GI bleeding which was unremarkable per her report. Today she was walking and had a syncopal episode. No preceding symptoms. Unclear if she struck her head but she did wake on the floor with a mild headache. Denies neck pain or back pain. She does report some left buttock discomfort. She has ambulated since the fall. Does report some chest pain with exertion but none at rest.  No dyspnea. She has a history of CAD based on abnormal calcium score in 2008. Does not have known history of stents or CABG. Most recent stress test was 2019. This showed ST depression with exertion but normal perfusion. She saw Dr. Edwin Carolina last month and was having chest heaviness with activity at that point. There was concern obviously for angina but unfortunately her care was complicated by recurrent anemia requiring iron transfusions in the PRBC transfusions. Last echo was on 11/6/2020 which showed normal LVEF, grade 2 diastolic dysfunction, moderate AS with a valve area of 1.28 cm², mean gradient of 13 with a peak of 22. REVIEW OF SYSTEMS     ROS Negative Except as Listed in HPI    PAST MEDICAL HISTORY     Past Medical History:   Diagnosis Date    Anemia     Mcneil's esophagus     Coronary atherosclerosis of native coronary vessel     no stents, no CABG, increased calcium score    GERD (gastroesophageal reflux disease)     daily meds    HLD (hyperlipidemia)     Hypercholesterolemia     Hypertension     Mild aortic stenosis     echo: 3/24/17  GINETTE 1.7 cm2, peak velocity 2.2 m/s, mean pressure gradient 10 mmHg, peak pressure gradient 212 mm Hg.      Occlusion and stenosis of carotid artery without mention of cerebral infarction     Asymptomatic bilateral 50-69%    Osteoarthritis     feet    [facility]     PONV (postoperative nausea and vomiting)     S/P insertion of iliac artery stent     left    Shingles      Past Surgical History:   Procedure Laterality Date    HX ANGIOPLASTY      Left common iliac artery with stent    HX APPENDECTOMY      HX CAROTID ENDARTERECTOMY Left 2017    HX CAROTID ENDARTERECTOMY Right 05/15/2017    HX COLONOSCOPY      for GI bleed    HX ENDOSCOPY      multiple scopes for upper GI bleed    HX HYSTERECTOMY      HX ORTHOPAEDIC      lumbar SURGERY SPINAL STENOSIS    HX TONSILLECTOMY      VASCULAR SURGERY PROCEDURE UNLIST Left     iliac artery stent     Social History     Socioeconomic History    Marital status:      Spouse name: Not on file    Number of children: Not on file    Years of education: Not on file    Highest education level: Not on file   Tobacco Use    Smoking status: Former Smoker     Last attempt to quit: 1977     Years since quittin.2    Smokeless tobacco: Never Used   Substance and Sexual Activity    Alcohol use: No    Drug use: No     Prior to Admission Medications   Prescriptions Last Dose Informant Patient Reported? Taking? ASCORBIC ACID (VITAMIN C PO)   Yes No   Sig: Take 1 Tab by mouth. Couple of times a week   acetaminophen (TYLENOL) 325 mg tablet   Yes No   Sig: Take 325 mg by mouth as needed for Pain. aspirin delayed-release (ASPIRIN EC) 81 mg tablet   Yes No   Sig: Take 81 mg by mouth every morning. cholecalciferol, vitamin D3, (VITAMIN D3) 2,000 unit tab   Yes No   Sig: Take 2,000 Units by mouth. Couple times a week   esomeprazole (NEXIUM) 40 mg capsule   Yes No   Sig: Take 20 mg by mouth every morning. hydroCHLOROthiazide 25 mg tab 25 mg, lisinopril 20 mg tab 20 mg   Yes No   Sig: Take  by mouth daily.    nitroglycerin (NITROSTAT) 0.4 mg SL tablet No No   Si Tab by SubLINGual route every five (5) minutes as needed for Chest Pain. Up to 3 doses. rosuvastatin (CRESTOR) 5 mg tablet   Yes No   Sig: Take  by mouth nightly. vitamin E (AQUA GEMS) 400 unit capsule   Yes No   Sig: Take 400 Units by mouth. Couple times a week      Facility-Administered Medications: None     No Known Allergies     PHYSICAL EXAM       Vitals:    20 1650   BP: (!) 191/90   Pulse: (!) 112   Resp: 16   Temp: 98.5 °F (36.9 °C)   SpO2: 99%    Vital signs were reviewed. Physical Exam  Vitals signs and nursing note reviewed. Constitutional:       General: She is not in acute distress. Appearance: She is not ill-appearing, toxic-appearing or diaphoretic. HENT:      Head: Atraumatic. Mouth/Throat:      Mouth: Mucous membranes are moist.   Eyes:      Extraocular Movements: Extraocular movements intact. Pupils: Pupils are equal, round, and reactive to light. Cardiovascular:      Rate and Rhythm: Normal rate and regular rhythm. Heart sounds: Murmur present. Pulmonary:      Effort: Pulmonary effort is normal.      Breath sounds: Normal breath sounds. Abdominal:      Palpations: Abdomen is soft. Tenderness: There is no abdominal tenderness. Musculoskeletal:      Comments: No extremity tenderness, swelling, deformities noted. No neck or back tenderness. Skin:     Comments: No lacerations. Neurological:      Comments: Awake, alert. GCS 15. CN II-XII grossly intact. Speech clear. No gross lateralizing neuro deficits.   Sensation grossly intact in the upper and lower extremities   Psychiatric:         Behavior: Behavior normal.          MEDICAL DECISION MAKING       Initial Impression and Treatment Plan  Nontoxic-appearing 65-year-old female presenting to the ED after syncopal episode in the setting of ongoing issues with exertional chest pain and recurrent anemia requiring transfusions with recent extensive work-up with GI which has been unremarkable. She states she supposed to get capsule endoscopy at some point. She has no chest pain at rest.  She is hypertensive and tachycardic. Nontoxic in appearance. Initial EKG showed a sinus tach with abnormalities in inferior leads. Sent picture of EKG Dr Dinesh Lange, who did not feel this represented STEMI. Recommends usual syncope/chest pain workup otherwise. Recent Results (from the past 8 hour(s))   CBC WITH AUTOMATED DIFF    Collection Time: 11/29/20  4:54 PM   Result Value Ref Range    WBC 7.6 4.3 - 11.1 K/uL    RBC 3.14 (L) 4.05 - 5.2 M/uL    HGB 8.7 (L) 11.7 - 15.4 g/dL    HCT 28.3 (L) 35.8 - 46.3 %    MCV 90.1 79.6 - 97.8 FL    MCH 27.7 26.1 - 32.9 PG    MCHC 30.7 (L) 31.4 - 35.0 g/dL    RDW 15.1 (H) 11.9 - 14.6 %    PLATELET 537 741 - 681 K/uL    MPV 9.9 9.4 - 12.3 FL    ABSOLUTE NRBC 0.00 0.0 - 0.2 K/uL    DF AUTOMATED      NEUTROPHILS 78 43 - 78 %    LYMPHOCYTES 13 13 - 44 %    MONOCYTES 7 4.0 - 12.0 %    EOSINOPHILS 1 0.5 - 7.8 %    BASOPHILS 0 0.0 - 2.0 %    IMMATURE GRANULOCYTES 1 0.0 - 5.0 %    ABS. NEUTROPHILS 6.0 1.7 - 8.2 K/UL    ABS. LYMPHOCYTES 1.0 0.5 - 4.6 K/UL    ABS. MONOCYTES 0.5 0.1 - 1.3 K/UL    ABS. EOSINOPHILS 0.1 0.0 - 0.8 K/UL    ABS. BASOPHILS 0.0 0.0 - 0.2 K/UL    ABS. IMM. GRANS. 0.0 0.0 - 0.5 K/UL   METABOLIC PANEL, COMPREHENSIVE    Collection Time: 11/29/20  4:54 PM   Result Value Ref Range    Sodium 138 136 - 145 mmol/L    Potassium 3.5 3.5 - 5.1 mmol/L    Chloride 105 98 - 107 mmol/L    CO2 24 21 - 32 mmol/L    Anion gap 9 7 - 16 mmol/L    Glucose 145 (H) 65 - 100 mg/dL    BUN 24 (H) 8 - 23 MG/DL    Creatinine 1.20 (H) 0.6 - 1.0 MG/DL    GFR est AA 56 (L) >60 ml/min/1.73m2    GFR est non-AA 46 (L) >60 ml/min/1.73m2    Calcium 9.6 8.3 - 10.4 MG/DL    Bilirubin, total 0.4 0.2 - 1.1 MG/DL    ALT (SGPT) 28 12 - 65 U/L    AST (SGOT) 20 15 - 37 U/L    Alk.  phosphatase 147 (H) 50 - 130 U/L    Protein, total 8.0 6.3 - 8.2 g/dL    Albumin 3.9 3.2 - 4.6 g/dL Globulin 4.1 (H) 2.3 - 3.5 g/dL    A-G Ratio 1.0 (L) 1.2 - 3.5     TROPONIN-HIGH SENSITIVITY    Collection Time: 11/29/20  4:54 PM   Result Value Ref Range    Troponin-High Sensitivity 167.3 (HH) 0 - 14 pg/mL   MAGNESIUM    Collection Time: 11/29/20  4:54 PM   Result Value Ref Range    Magnesium 1.5 (L) 1.8 - 2.4 mg/dL   GLUCOSE, POC    Collection Time: 11/29/20  5:28 PM   Result Value Ref Range    Glucose (POC) 198 (H) 65 - 100 mg/dL         Xr Pelv Ap Only    Result Date: 11/29/2020  Pelvis INDICATION: Pelvic pain after fall A supine view of the pelvis was obtained. Bony pelvis is intact. No fractures are seen. The hips are normally located. Left common iliac vascular stent is present. IMPRESSION: No pelvic fractures identified. Ct Head Wo Cont    Result Date: 11/29/2020  Head CT INDICATION: Syncope Multiple axial images obtained through the brain without intravenous contrast. Radiation dose reduction techniques were used for this study: All CT scans performed at this facility use one or all of the following: Automated exposure control, adjustment of the mA and/or kVp according to patient's size, iterative reconstruction. FINDINGS: No areas of abnormal attenuation are seen in the brain. There is no CT evidence of acute hemorrhage or infarction. The ventricles are normal in size. There are no extra-axial fluid collections. No masses are seen. The sinuses are clear. There are no bony lesions. IMPRESSION: No CT evidence of acute intracranial abnormality. Xr Chest Port    Result Date: 11/29/2020  Chest X-ray INDICATION: Syncope, chest pain A portable AP view of the chest was obtained. FINDINGS: The lungs are clear. There are no infiltrates or effusions. The heart size is normal.  There is vascular calcification. The bony thorax is intact.       IMPRESSION: No acute findings in the chest         Medications   aspirin tablet 325 mg (has no administration in time range)   labetaloL (NORMODYNE;TRANDATE) injection 20 mg (20 mg IntraVENous Given 11/29/20 1708)           Procedures    Update notes  5:27 PM-getting out of bed on getting back from radiology patient was quite lightheaded and felt poorly. She has already received her labetalol. Heart rate is in the 80s. Blood pressure has improved at this point. Repeat EKG shows known right bundle branch block and appears unchanged from prior aside from heart rate improving. No STEMI.    5:41 PM-reevaluated patient. She is feeling improved after her brief episode. Blood sugar was 198. Blood pressure 125/59 with heart rate in the 80s. No chest pain at this point. She has no upper back pain at this point. We will continue with observation. WBC normal.  Stable anemia. CMP and troponin are still pending. 5:59 PM-notified by lab of positive troponin at 167.3. She remains chest pain-free. Given her apparent head trauma from the syncopal episode, I ordered CT brain, will await her CT brain results before giving aspirin. We will obviously discuss with cardiology and get their recommendations given her history of recurrent anemia of unclear cause. 6:31 PM-CT brain unremarkable. Aspirin ordered. Will discuss with cardiology. 6:39 PM-discussed case with Dr. Yovany Osorio, cardiology. Accept in transfer. We will hold off on heparin drip pending their evaluation given recent issues with recurrent anemia. Disposition:    Transfer to [facility]    Diagnosis:     ICD-10-CM ICD-9-CM   1. NSTEM[facility] (non-ST elevated [facility]  I21.4 410.70   2. Syncope and collapse  R55 780.2         __________________________________________________________      Please note, this chart was dictated using Dragon dictation, voice recognition software. While efforts were made to correct any transcription errors, some may inadvertently remain. Please forgive punctuation and typographic/voice recognition errors.   Please contact me with any questions concerns or for clarification of documentation.

## 2020-11-29 NOTE — ED NOTES
Nurse called to xray for syncopal episode. Pt noted to be pale, drowsy, and nauseous. Pt brought back to room by nurse. EKG obtained. POC blood sugar 198. MD at bedside.

## 2020-11-30 LAB
ANION GAP SERPL CALC-SCNC: 6 MMOL/L (ref 7–16)
ATRIAL RATE: 116 BPM
BASOPHILS # BLD: 0 K/UL (ref 0–0.2)
BASOPHILS NFR BLD: 0 % (ref 0–2)
BUN SERPL-MCNC: 22 MG/DL (ref 8–23)
CALCIUM SERPL-MCNC: 9.3 MG/DL (ref 8.3–10.4)
CALCULATED P AXIS, ECG09: 78 DEGREES
CALCULATED R AXIS, ECG10: 102 DEGREES
CALCULATED T AXIS, ECG11: 6 DEGREES
CHLORIDE SERPL-SCNC: 107 MMOL/L (ref 98–107)
CHOLEST SERPL-MCNC: 150 MG/DL
CO2 SERPL-SCNC: 24 MMOL/L (ref 21–32)
CREAT SERPL-MCNC: 1.04 MG/DL (ref 0.6–1)
D DIMER PPP FEU-MCNC: 3.33 UG/ML(FEU)
DIAGNOSIS, 93000: NORMAL
DIFFERENTIAL METHOD BLD: ABNORMAL
EOSINOPHIL # BLD: 0.1 K/UL (ref 0–0.8)
EOSINOPHIL NFR BLD: 2 % (ref 0.5–7.8)
ERYTHROCYTE [DISTWIDTH] IN BLOOD BY AUTOMATED COUNT: 15.7 % (ref 11.9–14.6)
GLUCOSE SERPL-MCNC: 136 MG/DL (ref 65–100)
HCT VFR BLD AUTO: 25.3 % (ref 35.8–46.3)
HDLC SERPL-MCNC: 30 MG/DL (ref 40–60)
HDLC SERPL: 5 {RATIO}
HGB BLD-MCNC: 7.8 G/DL (ref 11.7–15.4)
IMM GRANULOCYTES # BLD AUTO: 0 K/UL (ref 0–0.5)
IMM GRANULOCYTES NFR BLD AUTO: 1 % (ref 0–5)
LDLC SERPL CALC-MCNC: 88.2 MG/DL
LIPID PROFILE,FLP: ABNORMAL
LYMPHOCYTES # BLD: 0.8 K/UL (ref 0.5–4.6)
LYMPHOCYTES NFR BLD: 15 % (ref 13–44)
MAGNESIUM SERPL-MCNC: 2.1 MG/DL (ref 1.8–2.4)
MCH RBC QN AUTO: 28.8 PG (ref 26.1–32.9)
MCHC RBC AUTO-ENTMCNC: 30.8 G/DL (ref 31.4–35)
MCV RBC AUTO: 93.4 FL (ref 79.6–97.8)
MONOCYTES # BLD: 0.4 K/UL (ref 0.1–1.3)
MONOCYTES NFR BLD: 7 % (ref 4–12)
NEUTS SEG # BLD: 3.9 K/UL (ref 1.7–8.2)
NEUTS SEG NFR BLD: 74 % (ref 43–78)
NRBC # BLD: 0 K/UL (ref 0–0.2)
P-R INTERVAL, ECG05: 144 MS
PLATELET # BLD AUTO: 183 K/UL (ref 150–450)
PMV BLD AUTO: 10.8 FL (ref 9.4–12.3)
POTASSIUM SERPL-SCNC: 3.6 MMOL/L (ref 3.5–5.1)
Q-T INTERVAL, ECG07: 318 MS
QRS DURATION, ECG06: 140 MS
QTC CALCULATION (BEZET), ECG08: 442 MS
RBC # BLD AUTO: 2.71 M/UL (ref 4.05–5.2)
SODIUM SERPL-SCNC: 137 MMOL/L (ref 138–145)
TRIGL SERPL-MCNC: 159 MG/DL (ref 35–150)
TROPONIN-HIGH SENSITIVITY: 368.3 PG/ML (ref 0–14)
TSH SERPL DL<=0.005 MIU/L-ACNC: 0.66 UIU/ML (ref 0.36–3.74)
VENTRICULAR RATE, ECG03: 116 BPM
VLDLC SERPL CALC-MCNC: 31.8 MG/DL (ref 6–23)
WBC # BLD AUTO: 5.3 K/UL (ref 4.3–11.1)

## 2020-11-30 PROCEDURE — C1894 INTRO/SHEATH, NON-LASER: HCPCS

## 2020-11-30 PROCEDURE — 4A023N7 MEASUREMENT OF CARDIAC SAMPLING AND PRESSURE, LEFT HEART, PERCUTANEOUS APPROACH: ICD-10-PCS | Performed by: INTERNAL MEDICINE

## 2020-11-30 PROCEDURE — 36252 INS CATH REN ART 1ST BILAT: CPT | Performed by: INTERNAL MEDICINE

## 2020-11-30 PROCEDURE — 36415 COLL VENOUS BLD VENIPUNCTURE: CPT

## 2020-11-30 PROCEDURE — 74011250637 HC RX REV CODE- 250/637: Performed by: NURSE PRACTITIONER

## 2020-11-30 PROCEDURE — B4101ZZ FLUOROSCOPY OF ABDOMINAL AORTA USING LOW OSMOLAR CONTRAST: ICD-10-PCS | Performed by: INTERNAL MEDICINE

## 2020-11-30 PROCEDURE — 93458 L HRT ARTERY/VENTRICLE ANGIO: CPT | Performed by: INTERNAL MEDICINE

## 2020-11-30 PROCEDURE — 36252 INS CATH REN ART 1ST BILAT: CPT

## 2020-11-30 PROCEDURE — 99153 MOD SED SAME PHYS/QHP EA: CPT

## 2020-11-30 PROCEDURE — 83735 ASSAY OF MAGNESIUM: CPT

## 2020-11-30 PROCEDURE — 74011250637 HC RX REV CODE- 250/637: Performed by: INTERNAL MEDICINE

## 2020-11-30 PROCEDURE — B2111ZZ FLUOROSCOPY OF MULTIPLE CORONARY ARTERIES USING LOW OSMOLAR CONTRAST: ICD-10-PCS | Performed by: INTERNAL MEDICINE

## 2020-11-30 PROCEDURE — 65660000000 HC RM CCU STEPDOWN

## 2020-11-30 PROCEDURE — 74011250636 HC RX REV CODE- 250/636: Performed by: NURSE PRACTITIONER

## 2020-11-30 PROCEDURE — 74011250636 HC RX REV CODE- 250/636: Performed by: INTERNAL MEDICINE

## 2020-11-30 PROCEDURE — B2151ZZ FLUOROSCOPY OF LEFT HEART USING LOW OSMOLAR CONTRAST: ICD-10-PCS | Performed by: INTERNAL MEDICINE

## 2020-11-30 PROCEDURE — 74011000250 HC RX REV CODE- 250: Performed by: INTERNAL MEDICINE

## 2020-11-30 PROCEDURE — 84443 ASSAY THYROID STIM HORMONE: CPT

## 2020-11-30 PROCEDURE — 99152 MOD SED SAME PHYS/QHP 5/>YRS: CPT

## 2020-11-30 PROCEDURE — C8929 TTE W OR WO FOL WCON,DOPPLER: HCPCS

## 2020-11-30 PROCEDURE — 85025 COMPLETE CBC W/AUTO DIFF WBC: CPT

## 2020-11-30 PROCEDURE — 77030015766

## 2020-11-30 PROCEDURE — 84484 ASSAY OF TROPONIN QUANT: CPT

## 2020-11-30 PROCEDURE — 80048 BASIC METABOLIC PNL TOTAL CA: CPT

## 2020-11-30 PROCEDURE — C1887 CATHETER, GUIDING: HCPCS

## 2020-11-30 PROCEDURE — 77030016699 HC CATH ANGI DX INFN1 CARD -A

## 2020-11-30 PROCEDURE — 75630 X-RAY AORTA LEG ARTERIES: CPT | Performed by: INTERNAL MEDICINE

## 2020-11-30 PROCEDURE — 93458 L HRT ARTERY/VENTRICLE ANGIO: CPT

## 2020-11-30 PROCEDURE — C1769 GUIDE WIRE: HCPCS

## 2020-11-30 PROCEDURE — 85379 FIBRIN DEGRADATION QUANT: CPT

## 2020-11-30 PROCEDURE — B4181ZZ FLUOROSCOPY OF BILATERAL RENAL ARTERIES USING LOW OSMOLAR CONTRAST: ICD-10-PCS | Performed by: INTERNAL MEDICINE

## 2020-11-30 PROCEDURE — 80061 LIPID PANEL: CPT

## 2020-11-30 PROCEDURE — 74011000636 HC RX REV CODE- 636: Performed by: INTERNAL MEDICINE

## 2020-11-30 PROCEDURE — 77030019569 HC BND COMPR RAD TERU -B

## 2020-11-30 PROCEDURE — 99223 1ST HOSP IP/OBS HIGH 75: CPT | Performed by: INTERNAL MEDICINE

## 2020-11-30 RX ORDER — FENTANYL CITRATE 50 UG/ML
25-50 INJECTION, SOLUTION INTRAMUSCULAR; INTRAVENOUS
Status: DISCONTINUED | OUTPATIENT
Start: 2020-11-30 | End: 2020-12-04 | Stop reason: HOSPADM

## 2020-11-30 RX ORDER — LIDOCAINE HYDROCHLORIDE 10 MG/ML
1-30 INJECTION, SOLUTION EPIDURAL; INFILTRATION; INTRACAUDAL; PERINEURAL ONCE
Status: COMPLETED | OUTPATIENT
Start: 2020-11-30 | End: 2020-11-30

## 2020-11-30 RX ORDER — MIDAZOLAM HYDROCHLORIDE 1 MG/ML
.5-2 INJECTION, SOLUTION INTRAMUSCULAR; INTRAVENOUS
Status: DISCONTINUED | OUTPATIENT
Start: 2020-11-30 | End: 2020-12-04 | Stop reason: HOSPADM

## 2020-11-30 RX ORDER — HEPARIN SODIUM 200 [USP'U]/100ML
3 INJECTION, SOLUTION INTRAVENOUS CONTINUOUS
Status: DISCONTINUED | OUTPATIENT
Start: 2020-11-30 | End: 2020-12-04

## 2020-11-30 RX ADMIN — LISINOPRIL 20 MG: 20 TABLET ORAL at 08:29

## 2020-11-30 RX ADMIN — HEPARIN SODIUM 2 ML: 10000 INJECTION INTRAVENOUS; SUBCUTANEOUS at 10:14

## 2020-11-30 RX ADMIN — PANTOPRAZOLE SODIUM 40 MG: 40 TABLET, DELAYED RELEASE ORAL at 06:23

## 2020-11-30 RX ADMIN — MIDAZOLAM 2 MG: 1 INJECTION INTRAMUSCULAR; INTRAVENOUS at 10:13

## 2020-11-30 RX ADMIN — Medication 10 ML: at 21:33

## 2020-11-30 RX ADMIN — PERFLUTREN 1 ML: 6.52 INJECTION, SUSPENSION INTRAVENOUS at 14:00

## 2020-11-30 RX ADMIN — LIDOCAINE HYDROCHLORIDE 2 ML: 10 INJECTION, SOLUTION EPIDURAL; INFILTRATION; INTRACAUDAL; PERINEURAL at 10:14

## 2020-11-30 RX ADMIN — Medication 1 EACH: at 21:30

## 2020-11-30 RX ADMIN — SODIUM CHLORIDE 75 ML/HR: 900 INJECTION, SOLUTION INTRAVENOUS at 14:19

## 2020-11-30 RX ADMIN — IOPAMIDOL 170 ML: 755 INJECTION, SOLUTION INTRAVENOUS at 10:35

## 2020-11-30 RX ADMIN — NITROGLYCERIN 1 INCH: 20 OINTMENT TOPICAL at 06:00

## 2020-11-30 RX ADMIN — ROSUVASTATIN 5 MG: 10 TABLET, FILM COATED ORAL at 21:31

## 2020-11-30 RX ADMIN — ASPIRIN 81 MG: 81 TABLET ORAL at 06:22

## 2020-11-30 RX ADMIN — HEPARIN SODIUM 3 ML/HR: 5000 INJECTION, SOLUTION INTRAVENOUS; SUBCUTANEOUS at 10:10

## 2020-11-30 RX ADMIN — ACETAMINOPHEN 325 MG: 325 TABLET, FILM COATED ORAL at 18:15

## 2020-11-30 RX ADMIN — FENTANYL CITRATE 25 MCG: 50 INJECTION, SOLUTION INTRAMUSCULAR; INTRAVENOUS at 10:13

## 2020-11-30 NOTE — ROUTINE PROCESS
TRANSFER - IN REPORT: 
 
Verbal report received from [facility] , RN on [facility] being received from  ED for routine progression of care Report consisted of patients Situation, Background, Assessment and Recommendations(SBAR). Information from the following report(s) SBAR, Kardex, ED Summary, Intake/Output, MAR, Accordion and Recent Results was reviewed with the receiving nurse. Opportunity for questions and clarification was provided. Assessment completed upon patients arrival to unit. Skin unremarkable. Sacrum/coccyx and heels are free of skin breakdown and/or pressure sores. Scattered moles, skin tags.

## 2020-11-30 NOTE — H&P
[facility] CARDIOLOGY   History & Physical                 Primary Cardiologist: Dr. Rui Vela    Primary Care Physician: Dr. Ge Fitzpatrick Physician: Dr. Stone General:     Patient is a 67 y.o. female with PMHx of CAD, HTN, HLD, Multiple myeloma, CKD III, TAYLOR, ARIEL (s/p R+L CEA 2017) and non-rheumatic AS who presented to the ED at [facility] [place] following a syncopal event in which she fell to the floor. States she felt very thirsty and walked in to the kitchen to get some water. She felt dizzy and tried to turn and go to the chair but apparently fell. She woke up after an unknown amount of time lying flat on her back in \"excruciating\" pain in her L back/hip area. She was unable to get up. She called for her  who assisted her over to the couch. Denies palpitations, N/V, CP, SOB or diaphoresis prior to the event. She continued to have pain and thus presented to the ED for evaluation. In the ED: Pt underwent CT head (-) for acute pathology. CXR and Pelvic XR (-) as well. Initial hsTrop 167.3, Hgb 8.7, Cr 1.20, UA(-), Mag 1.5. She was given 325mg ASA. Cardiology was consulted for concerning EKG and Pt was transferred to [facility] for further evaluation and management. Pt has hx of CAD per +Ca score in 2008[facility] with [facility] but normal perfusion. She recently saw Dr. Rui Vela on 10/15/20 reporting CP concerning for angina. She was placed on Toprol and OP ECHO and stress test ordered. She developed a 7# weight gain on the Toprol and med was stopped. She then underwent the ECHO but had to cancel stress test due to requiring a BMBx and PRBC transfusion. She also has had an IV iron infusion and underwent EGD/colonoscopy which was per her report unrevealing. She is scheduled for OP capsule endoscopy but this has not yet been done. States that her Hgb was 10.1 after her last transfusion on 11/11/20. Cardiac Hx:  Cardiac Calcium score (12/28/08): Total: 218.   LAD: 129, Lcx: 29, RCA: 60  Lexiscan Cardiolite (3/9/10):  Apical thinning. No ischemia. Normal LV function EF 68%  Echo (6/28/12): Normal LV systolic function. EF 60-65%. Grade 1 diastolic dysfunction. Normal atrial sizes. Aortic valve sclerosis with no stenosis. Mean gradient 7.2. No effusion. Echo (3/24/17): Normal LV systolic function. EF 60-65%. No WMA. +DD. Aortic valve stenosis. Mean gradient 10. Stress test (6/24/19): I/L ST depression with exertion, normal perfusion, EF 70%    Past Medical History:   Diagnosis Date    Anemia     Mcneil's esophagus     Coronary atherosclerosis of native coronary vessel     no stents, no CABG, increased calcium score    GERD (gastroesophageal reflux disease)     daily meds    HLD (hyperlipidemia)     Hypercholesterolemia     Hypertension     Mild aortic stenosis     echo: 3/24/17  GINETTE 1.7 cm2, peak velocity 2.2 m/s, mean pressure gradient 10 mmHg, peak pressure gradient 212 mm Hg.      Occlusion and stenosis of carotid artery without mention of cerebral infarction     Asymptomatic bilateral 50-69%    Osteoarthritis     feet    [facility]peripheral [facility] disease[facility])     PONV (postoperative nausea and vomiting)     S/P insertion of iliac artery stent     left    Shingles       Past Surgical History:   Procedure Laterality Date    HX ANGIOPLASTY      Left common iliac artery with stent    HX APPENDECTOMY      HX CAROTID ENDARTERECTOMY Left 03/30/2017    HX CAROTID ENDARTERECTOMY Right 05/15/2017    HX COLONOSCOPY      for GI bleed    HX ENDOSCOPY      multiple scopes for upper GI bleed    HX HYSTERECTOMY      HX ORTHOPAEDIC      lumbar SURGERY SPINAL STENOSIS    HX TONSILLECTOMY      VASCULAR SURGERY PROCEDURE UNLIST Left 2006    iliac artery stent      Allergies   Allergen Reactions    Toprol Xl [Metoprolol Succinate] Swelling     Pt states extremity swelling when taking this medication     Social History     Tobacco Use    Smoking status: Former Smoker     Last attempt to quit: 1977     Years since quittin.2    Smokeless tobacco: Never Used   Substance Use Topics    Alcohol use: No      FH:   Family History   Problem Relation Age of Onset    Hypertension Father     Stroke Father     Dementia Mother         Review of Systems  General: no acute weight change, +weakness, no increased fatigue, no fever or chills, no diaphoresis  Skin: no rashes, lumps, wounds, sores or other skin changes  HEENT: no headache, +dizziness, no lightheadedness, vision changes, hearing changes, tinnitus, vertigo, sinus pressure/pain, bleeding gums, sore throat, or hoarseness  Neck: no swollen glands, goiter, pain or stiffness  Respiratory: no cough, no congestion, no sputum, no hemoptysis, no dyspnea, no wheezing  Cardiovascular: + as per HPI  Gastrointestinal: no increased reflux, no constipation, diarrhea, liver problems, denies kezia GI bleeding, N/V, no abdominal pain or distension  Urinary: no frequency, urgency, hematuria, burning/pain with urination, flank pain, polyuria, nocturia, or difficulty urinating  Peripheral Vascular: no claudication, leg cramps, prior DVTs, swelling of calves, legs, or feet, color change, or swelling with redness or tenderness  Musculoskeletal: no new muscle or joint stiffness, swelling or erythema, +L back/flank/hip pain s/p fall  Psychiatric: no increased depression, anxiety or excessive stress  Neurological: no sensory or motor loss, seizures, syncope, tremors, numbness, tingling, no changes in mood, attention, or speech, no changes in orientation, memory, insight, or judgment. Hematologic: ++anemia, easy bruising or bleeding  Endocrine: no thyroid problems, no heat or cold intolerance, excessive sweating, polyuria, polydipsia, no diabetes.         Objective:       Visit Vitals  BP (!) 171/72 (BP 1 Location: Left arm, BP Patient Position: Post activity)   Pulse 89   Temp 98.2 °F (36.8 °C)   Resp 18   SpO2 98%       No intake/output data recorded. No intake/output data recorded. Physical Exam:  General: well developed, well nourished, NAD, resting comfortably  HEENT: pupils equal and round, no abnormalities noted, sclera clear, EOMs intact  Neck: supple, no JVD, no carotid bruits  Heart: S1S2 with RRR, +murmur, no rubs or gallops  Lungs: clear throughout auscultation bilaterally, normal effort on RA, no wheezing or rales  Abd: soft, nontender, nondistended, +bowel sounds  Ext: warm, no edema, calves supple/nontender, pulses 2+ bilaterally  Skin: warm and dry, intact  Psychiatric: appropriate mood and affect, cooperative  Neurologic: A&O X 3, moves all 4 equally, CNs intact      ECG: ordered here, EKGs not sent from [facility] and not available in CC for review    ECHO: 11/6/2020 which showed normal LVEF, grade 2 diastolic dysfunction, moderate AS with a valve area of 1.28 cm², mean gradient of 13 with a peak of 22. Data Review:      Recent Results (from the past 24 hour(s))   CBC WITH AUTOMATED DIFF    Collection Time: 11/29/20  4:54 PM   Result Value Ref Range    WBC 7.6 4.3 - 11.1 K/uL    RBC 3.14 (L) 4.05 - 5.2 M/uL    HGB 8.7 (L) 11.7 - 15.4 g/dL    HCT 28.3 (L) 35.8 - 46.3 %    MCV 90.1 79.6 - 97.8 FL    MCH 27.7 26.1 - 32.9 PG    MCHC 30.7 (L) 31.4 - 35.0 g/dL    RDW 15.1 (H) 11.9 - 14.6 %    PLATELET 177 633 - 602 K/uL    MPV 9.9 9.4 - 12.3 FL    ABSOLUTE NRBC 0.00 0.0 - 0.2 K/uL    DF AUTOMATED      NEUTROPHILS 78 43 - 78 %    LYMPHOCYTES 13 13 - 44 %    MONOCYTES 7 4.0 - 12.0 %    EOSINOPHILS 1 0.5 - 7.8 %    BASOPHILS 0 0.0 - 2.0 %    IMMATURE GRANULOCYTES 1 0.0 - 5.0 %    ABS. NEUTROPHILS 6.0 1.7 - 8.2 K/UL    ABS. LYMPHOCYTES 1.0 0.5 - 4.6 K/UL    ABS. MONOCYTES 0.5 0.1 - 1.3 K/UL    ABS. EOSINOPHILS 0.1 0.0 - 0.8 K/UL    ABS. BASOPHILS 0.0 0.0 - 0.2 K/UL    ABS. IMM.  GRANS. 0.0 0.0 - 0.5 K/UL   METABOLIC PANEL, COMPREHENSIVE    Collection Time: 11/29/20  4:54 PM   Result Value Ref Range    Sodium 138 136 - 145 mmol/L    Potassium 3.5 3.5 - 5.1 mmol/L    Chloride 105 98 - 107 mmol/L    CO2 24 21 - 32 mmol/L    Anion gap 9 7 - 16 mmol/L    Glucose 145 (H) 65 - 100 mg/dL    BUN 24 (H) 8 - 23 MG/DL    Creatinine 1.20 (H) 0.6 - 1.0 MG/DL    GFR est AA 56 (L) >60 ml/min/1.73m2    GFR est non-AA 46 (L) >60 ml/min/1.73m2    Calcium 9.6 8.3 - 10.4 MG/DL    Bilirubin, total 0.4 0.2 - 1.1 MG/DL    ALT (SGPT) 28 12 - 65 U/L    AST (SGOT) 20 15 - 37 U/L    Alk.  phosphatase 147 (H) 50 - 130 U/L    Protein, total 8.0 6.3 - 8.2 g/dL    Albumin 3.9 3.2 - 4.6 g/dL    Globulin 4.1 (H) 2.3 - 3.5 g/dL    A-G Ratio 1.0 (L) 1.2 - 3.5     TROPONIN-HIGH SENSITIVITY    Collection Time: 11/29/20  4:54 PM   Result Value Ref Range    Troponin-High Sensitivity 167.3 (HH) 0 - 14 pg/mL   MAGNESIUM    Collection Time: 11/29/20  4:54 PM   Result Value Ref Range    Magnesium 1.5 (L) 1.8 - 2.4 mg/dL   GLUCOSE, POC    Collection Time: 11/29/20  5:28 PM   Result Value Ref Range    Glucose (POC) 198 (H) 65 - 100 mg/dL   URINE MICROSCOPIC    Collection Time: 11/29/20  7:58 PM   Result Value Ref Range    WBC 5-10 0 /hpf    RBC 0-3 0 /hpf    Epithelial cells 0-3 0 /hpf    Bacteria 0 0 /hpf    Casts 0-3 0 /lpf         Assessment/Plan:   Principal Problem:    Chest pain -- admit to tele, trend Prerna, cont ASA, NTG paste, BP control, may need IV heparin but will hold off with anemia and re-eval pending clinical course, NPO at [facility] for [facility] in AM    Active Problems:    Hypertension -- cont home meds x hold HCTZ prior to [facility], adding NTG and will monitor effect      Cancer -- MULTIPLE MYELOMA -- followed at [facility], recent BMBx       CKD (chronic kidney disease), stage III -- [facility], daily labs/lytes, watch I&O      Dyslipidemia -- cont statin, check lipids      Iron deficiency anemia -- received PRBC transfusion on and Hbg post-tx was 10.1, Hgb on arrival to [facility] was 8.7, Pt states she is surprised how much Hgb has dropped since PRBCs, will trend CBCs, ongoing anemia is challenging as Pt also needs ischemia w/u, will send T&C with next labs in case she requires PRBCs here          Coronary atherosclerosis of native coronary vessel -- cont ASA, ACEi and statin, intolerant of BB          Nonrheumatic aortic valve stenosis -- recent ECHO as above      Hypomagnesemia -- supplement and f/u daily labs       Syncope and collapse -- unclear source, possibly volume depletion as Pt reports increased thirst vs worsening anemia vs CV source -- monitor on tele for poss arrhythmias, IV hydration overnight, f/u AM labs, recent ECHO as above, check orthostatic BPs, further w/u pending clinical course        Layne Nunez, ALEJANDRA-C  11/29/2020

## 2020-11-30 NOTE — PROGRESS NOTES
Report received from Julio C [facility] RN. Procedural findings communicated. Intra procedural  medication administration reviewed. Progression of care discussed.      Patient received into [id number] [facility] 3 post sheath removal.     Access site without bleeding or swelling yes    Dressing dry and intact yes    Patient instructed to limit movement to right upper extremity    Routine post procedural vital signs and site assessment initiated yes

## 2020-11-30 NOTE — ED NOTES
TRANSFER - OUT REPORT:    Verbal report given to AGUSTO Nunes(name) on Obdulio Luz  being transferred to [facility] DT(unit) for routine progression of care       Report consisted of patients Situation, Background, Assessment and   Recommendations(SBAR). Information from the following report(s) ED Summary was reviewed with the receiving nurse. Lines:   Peripheral IV 11/29/20 Right Antecubital (Active)   Site Assessment Clean, dry, & intact 11/29/20 1656   Phlebitis Assessment 0 11/29/20 1656   Infiltration Assessment 0 11/29/20 1656   Dressing Status Clean, dry, & intact 11/29/20 1656        Opportunity for questions and clarification was provided.       Patient transported with:  [facility]

## 2020-11-30 NOTE — PROCEDURES
Brief Cardiac Procedure Note    Patient: Priscilla Chavarria MRN: [id number]  SSN: [id number]    YOB: 1945  Age: 67 y.o. Sex: female      Date of Procedure: 11/30/2020     Pre-procedure Diagnosis: Unstable Angina and malignant HTN    Post-procedure Diagnosis: Coronary Artery Disease    Procedure: Left Heart Catheterization + renals    Brief Description of Procedure: As above    Performed By: Leonor Hampton MD     Assistants: None    Anesthesia: Moderate Sedation    Estimated Blood Loss: Less than 10 mL      Specimens: None    Implants: None    Findings: severe RCA and LCx disease. Complications: None    Recommendations: GI and CT surgery consult. GI to help wether would tolerate DAPT but suspect may be best served with CABG to avoid DAPT for 6 months.      Signed By: Leonor Hampton MD     November 30, 2020

## 2020-11-30 NOTE — ROUTINE PROCESS
Radial compression band removed at 1446 after slowly reducing air from 12 cc to zero as per hospital protocol. No bleeding or hematoma noted. 2 x 2 gauze with tegaderm placed over puncture site. The affected extremity is warm and dry to the touch. Frequent vital signs printed and placed on bedside chart. Patient instructed to call if any bleeding noted on gauze. Patient verbalized understanding the nursing instructions.

## 2020-11-30 NOTE — PROGRESS NOTES
TRANSFER - OUT REPORT:    Verbal report given to Gene Kaye RN on [employer]  being transferred to [address] [place] [address] for routine progression of care       Report consisted of patients Situation, Background, Assessment and Recommendations(SBAR). Information from the following report(s) SBAR, Kardex, Procedure Summary and MAR was reviewed with the receiving nurse. Opportunity for questions and clarification was provided.       [facility] with Dr Elijah Levy  No intevention MVD  2 versed  25 fentanyl  Right radial RBand 12ml at 1040

## 2020-11-30 NOTE — PROGRESS NOTES
CM spoke with patient about DCP. Pts daughter was at bedside. Pt presented to the ED at [facility] following a syncopal event in which she fell to the floor. States she felt very thirsty and walked in to the kitchen to get some water. She felt dizzy and tried to turn and go to the chair but apparently fell. She woke up after an unknown amount of time lying flat on her back in \"excruciating\" pain in her L back/hip area. She was unable to get up. Pt lives in a one-level home with her spouse. Pts daughter reported that up until yesterday, her mother has always been healthy and independent. Pt is not on home oxygen, does not use any DMEs, BSC or SS; denied using grab bars. Pt stated that she does not have a PCP doctor any more and requested that CM refer her to one with BS. CM sent email requesting a referral be made. Insurance verified. Pts daughter will transport home at d/c. CM will continue to monitor for any d/c needs. [id number] appt made and updated on pts d/c paperwork: Dr. Naomie Bond at 12/21 at [address] [place] [address] & [facility]    [address] [place] [address]., [address] [place] [address], [address] [place] [address]   Phone: [phone]     Fax: [phone]     Care Management Interventions  PCP Verified by CM: No  Mode of Transport at Discharge:  Other (see comment)(Family)  Transition of Care Consult (CM Consult): Discharge Planning  Current Support Network: Family Lives [place], Own Home, Lives with Spouse  Confirm Follow Up Transport: Family  The Plan for Transition of Care is Related to the Following Treatment Goals : Return to her baseline  Discharge Location  Discharge Placement: Home

## 2020-11-30 NOTE — ROUTINE PROCESS
Bedside and Verbal report given to self by Judith Tellez RN. Report included SBAR, Kardex, ED Summary, Procedure Summary, Intake and Output and Cardiac Rhythm.

## 2020-11-30 NOTE — ROUTINE PROCESS
TRANSFER - IN REPORT: 
 
Verbal report received from AGUSTO Freedman(name) on [facility]  being received from [facility](unit) for routine post - op Report consisted of patients Situation, Background, Assessment and  
Recommendations(SBAR). Information from the following report(s) SBAR, Procedure Summary and MAR was reviewed with the receiving nurse. Opportunity for questions and clarification was provided. Assessment completed upon patients arrival to unit and care assumed.

## 2020-11-30 NOTE — PROCEDURES
[facility]  CARDIAC CATH    Name:  Laurie Hardin  MR#:  [id number]  :  1945  ACCOUNT #:  [de-identified]  DATE OF SERVICE:  2020    PROCEDURES PERFORMED:  1. Left heart catheterization, selective coronary arteriography, and left ventriculogram via the right radial approach. 2.  Bilateral selective renal angiography. 3.  Abdominal aortography. PREOPERATIVE DIAGNOSES:  1. Syncope with elevated cardiac enzymes concerning for non-ST-elevation myocardial infarction. 2.  Malignant hypertension with erratic blood pressure, rule out renal artery stenosis. POSTOPERATIVE DIAGNOSES:  1.  Multivessel coronary artery disease. 2.  Nonobstructive renal artery stenosis. SURGEON:  Carine Castillo MD    ASSISTANT:  None. ESTIMATED BLOOD LOSS:  Less than 5 mL. SPECIMENS REMOVED:  None. COMPLICATIONS:  None. IMPLANTS:  None. ANESTHESIA:  The patient received moderate supervised conscious sedation with 2 mg Versed, 25 mcg fentanyl. Sedation start time was 10:15 a.m. with a procedure completion time of 10:38 a.m. Sedation was administered by Emily Verma RN, under my supervision. FINDINGS:  As below. PROCEDURE:  After informed consent was obtained, the patient was brought to the cardiac catheterization lab. The right radial artery was prepped and draped in the usual sterile fashion. Utilizing modified Seldinger technique and micropuncture needle, the right radial artery was entered. A 6-Turkish Terumo Slender sheath was placed without difficulty. A radial cocktail consisting of 2000 units of heparin, 2 mg of verapamil, and 200 mcg of nitroglycerin was administered. A 5-Turkish Tiger 4.0 catheter was used to select and engage the ostium of the right coronary artery and ultimately an EBU3.0 guide was used to select and engage the ostium of the left main coronary artery. I could not engage the left main coronary artery with the Tiger or a JL3.5 catheter.   Selective injection verification was performed. A pigtail catheter was used to cross the aortic valve and the left ventricle. Hemodynamic measurements and left ventriculogram were obtained. Left ventricular aortic pressure gradient was obtained by pullback technique. The pigtail catheter was then placed in the abdominal aorta at the level of the renal arteries. Using 12 mL per second for 24 mL, abdominal aortography was performed. There was some question of whether there was a right renal artery stenosis and therefore a multipurpose catheter was advanced and used to engage the right and left renal arteries respectively. Selective injections were performed in the AP projection. At the conclusion of the procedure, radial sheath was removed and a pneumatic band was placed with good hemostasis. No complications were encountered. CONTRAST:  Isovue 170. HEMODYNAMIC RESULTS:  1. Aortic pressure is 137/65. 2.  Left ventricular end-diastolic pressure was 23.  3.  There is no significant gradient across the aortic valve. ANGIOGRAPHIC RESULTS:  1. Left main coronary artery:  Large-caliber vessel, 30% distal stenosis. 2.  LAD:  Medium-caliber vessel in the proximal vessel but then becomes very small caliber after its first diagonal artery. Diffuse 30% mid to distal stenosis, 20% proximal stenosis. 3.  First diagonal artery:  Small-caliber vessel. There is a 40% to 50% mid stenosis but the vessel is small caliber at this point, approximately 1.5-2 mm in size. 4.  Right coronary artery is a medium-caliber dominant vessel. Contains an ostial 80% stenosis. Mid to distal vessel contains a 60% stenosis. The distal vessel is patent. 5.  Right PDA:  Medium-caliber vessel. Patent. 6.  Right posterolateral branch:  Medium-caliber vessel. Patent. 7.  Left ventriculogram:  Mild to moderately reduced LV systolic function. EF approximately 40%. Inferolateral hypokinesis.   8.  Abdominal aortogram shows a patent abdominal aorta with diffuse 20% plaquing. The renal arteries were not seen at the ostium but were patent and single vessels. Renal angiography:  1. Right renal artery is a medium-caliber vessel, 20% ostial and proximal stenosis. 2.  Left renal artery:  Medium-caliber vessel, 20% ostial and proximal stenosis. CONCLUSIONS:  1.  Multivessel coronary artery disease. 2.  Mild to moderately reduced LV systolic function with segmental wall motion abnormalities concerning for ischemic cardiomyopathy. 3.  Patent renal arteries with mild nonobstructive disease. PLAN:  Continue medical therapy.       Jn Garcia MD      [id number]  D:  11/30/2020 10:50  T:  11/30/2020 11:20  JOB #:  [id number]  CC:  Yeimi Banda MD

## 2020-11-30 NOTE — PROGRESS NOTES
TRANSFER - OUT REPORT:    Verbal report given to AGUSTO Muñiz on Odalys Otto  being transferred to [facility](unit) for routine post - op       Report consisted of patients Situation, Background, Assessment and   Recommendations(SBAR). Information from the following report(s) Procedure Summary, Intake/Output and MAR was reviewed with the receiving nurse. Lines:   Peripheral IV 11/29/20 Right Antecubital (Active)   Site Assessment Clean, dry, & intact; Clean;Dry 11/30/20 1047   Phlebitis Assessment 0 11/30/20 1047   Infiltration Assessment 0 11/30/20 1047   Dressing Status Clean, dry, & intact; Clean;Dry 11/30/20 1047   Dressing Type Tape;Transparent 11/30/20 1047   Hub Color/Line Status Patent; Infusing;Pink 11/30/20 1047   Alcohol Cap Used No 11/30/20 7672        Opportunity for questions and clarification was provided.

## 2020-11-30 NOTE — ROUTINE PROCESS
Bedside and Verbal report given to AGUSTO Oliveira by self. Report included SBAR, Kardex, ED summary, procedure summary, recent results and cardiac rhythm.

## 2020-11-30 NOTE — CONSULTS
Gelacio Vinson. MD Devaughn Taylor. Vannessa Ragland MD             Johan Singernis         11/29/2020        1945    REFERRING PHYSICIAN:  Dr. Juliette Brown:  The patient is a 67 y.o. female who presented to the ER at [address] after syncopal episode. She was lying in the floor when she regained consciousness. She has noted some intermittent chest pain recently. Her troponin was elevated on arrival. She has mild to moderate AS. She was transferred to [facility]. Troponin continued to rise consistent with NSTEMI. She underwent cardiac catheterization today that showed high grade stenosis in the LCx and RCA. LV gram showed mild to moderately reduced LV function with inferolateral hypokinesis. There was no significant gradient across the AV. She has \"smoldering\" multiple myeloma as well as TAYLOR. She was transfused about 2 weeks ago and was due for another transfusion this week. She does not use any mobility device. She states she feels weak and easily fatigued all the time. She has PAD and carotid stenosis s/p CEA. Risk factors include HTN, dyslipidemia    ** No history of stroke, TIA, prior cardiac surgery, anesthetic complication, lung disease, DVT or PE      Past Medical History:   Diagnosis Date    Anemia     Mcneil's esophagus     Coronary atherosclerosis of native coronary vessel     no stents, no CABG, increased calcium score    GERD (gastroesophageal reflux disease)     daily meds    HLD (hyperlipidemia)     Hypercholesterolemia     Hypertension     Mild aortic stenosis     echo: 3/24/17  GINETTE 1.7 cm2, peak velocity 2.2 m/s, mean pressure gradient 10 mmHg, peak pressure gradient 212 mm Hg.      Occlusion and stenosis of carotid artery without mention of cerebral infarction     Asymptomatic bilateral 50-69%    Osteoarthritis     feet    [facility]peripheral artery disease) ([facility])     PONV (postoperative nausea and vomiting)     S/P insertion of iliac artery stent     left    Shingles        Past Surgical History:   Procedure Laterality Date    HX ANGIOPLASTY      Left common iliac artery with stent    HX APPENDECTOMY      HX CAROTID ENDARTERECTOMY Left 2017    HX CAROTID ENDARTERECTOMY Right 05/15/2017    HX COLONOSCOPY      for GI bleed    HX ENDOSCOPY      multiple scopes for upper GI bleed    HX HYSTERECTOMY      HX ORTHOPAEDIC      lumbar SURGERY SPINAL STENOSIS    HX TONSILLECTOMY      VASCULAR SURGERY PROCEDURE UNLIST Left     iliac artery stent       Family History   Problem Relation Age of Onset    Hypertension Father     Stroke Father     Dementia Mother        Social History     Socioeconomic History    Marital status:      Spouse name: Not on file    Number of children: Not on file    Years of education: Not on file    Highest education level: Not on file   Occupational History    Not on file   Social Needs    Financial resource strain: Not on file    Food insecurity     Worry: Not on file     Inability: Not on file    Transportation needs     Medical: Not on file     Non-medical: Not on file   Tobacco Use    Smoking status: Former Smoker     Last attempt to quit: 1977     Years since quittin.2    Smokeless tobacco: Never Used   Substance and Sexual Activity    Alcohol use: No    Drug use: No    Sexual activity: Not on file   Lifestyle    Physical activity     Days per week: Not on file     Minutes per session: Not on file    Stress: Not on file   Relationships    Social connections     Talks on phone: Not on file     Gets together: Not on file     Attends Sabianism service: Not on file     Active member of club or organization: Not on file     Attends meetings of clubs or organizations: Not on file     Relationship status: Not on file    Intimate partner violence     Fear of current or ex partner: Not on file     Emotionally abused: Not on file     Physically abused: Not on file     Forced sexual activity: Not on file Other Topics Concern    Not on file   Social History Narrative    Not on file       Allergies   Allergen Reactions    Toprol Xl [Metoprolol Succinate] Swelling     Pt states extremity swelling when taking this medication       Current Facility-Administered Medications on File Prior to Encounter   Medication Dose Route Frequency Provider Last Rate Last Dose    [COMPLETED] labetaloL (NORMODYNE;TRANDATE) injection 20 mg  20 mg IntraVENous Cha Ly MD   20 mg at 11/29/20 1708    [COMPLETED] aspirin tablet 325 mg  325 mg Oral NOW Kassandra Maloney MD   325 mg at 11/29/20 1904     Current Outpatient Medications on File Prior to Encounter   Medication Sig Dispense Refill    nitroglycerin (NITROSTAT) 0.4 mg SL tablet 1 Tab by SubLINGual route every five (5) minutes as needed for Chest Pain. Up to 3 doses. 1 Bottle 3    hydroCHLOROthiazide 25 mg tab 25 mg, lisinopril 20 mg tab 20 mg Take  by mouth daily.  rosuvastatin (CRESTOR) 5 mg tablet Take  by mouth nightly.  acetaminophen (TYLENOL) 325 mg tablet Take 325 mg by mouth as needed for Pain.  cholecalciferol, vitamin D3, (VITAMIN D3) 2,000 unit tab Take 2,000 Units by mouth. Couple times a week      vitamin E (AQUA GEMS) 400 unit capsule Take 400 Units by mouth. Couple times a week      aspirin delayed-release (ASPIRIN EC) 81 mg tablet Take 81 mg by mouth every morning.  esomeprazole (NEXIUM) 40 mg capsule Take 20 mg by mouth every morning.  ASCORBIC ACID (VITAMIN C PO) Take 1 Tab by mouth. Couple of times a week         REVIEW OF SYSTEMS:  Review of Systems   Constitution: Positive for malaise/fatigue. Negative for chills, fever and weight gain. HENT: Negative for ear pain, hearing loss, nosebleeds, sore throat and tinnitus. Eyes: Negative for blurred vision, vision loss in left eye and vision loss in right eye. Cardiovascular: Positive for chest pain.  Negative for dyspnea on exertion, leg swelling, near-syncope, orthopnea, palpitations, paroxysmal nocturnal dyspnea and syncope. Respiratory: Negative for cough, hemoptysis, shortness of breath, sputum production and wheezing. Endocrine: Negative for cold intolerance, heat intolerance and polydipsia. Hematologic/Lymphatic: Does not bruise/bleed easily. Skin: Negative for color change and rash. Musculoskeletal: Negative for back pain, joint pain, joint swelling and myalgias. Gastrointestinal: Negative for abdominal pain, constipation, diarrhea, dysphagia, heartburn, hematemesis, melena, nausea and vomiting. Genitourinary: Negative for dysuria, frequency, hematuria and urgency. Neurological: Negative for difficulty with concentration, dizziness, headaches, light-headedness, numbness, paresthesias, seizures, vertigo and weakness. Psychiatric/Behavioral: Negative for altered mental status and depression. Physical Exam  Vitals:    11/30/20 1036 11/30/20 1047 11/30/20 1057 11/30/20 1112   BP: (!) 141/67 132/60 132/60 131/61   Pulse: 90 90     Resp: 18 16     Temp:       SpO2: 98% 93% 93% 92%   Weight:           Physical Exam:  General: Well Developed, Well Nourished, No Acute Distress  HEENT: Normocephalic, pupils equal and round, no scleral icterus  Neck: supple, no JVD  Chest wall: No deformity  Heart: S1S2 with RRR   Lungs: Clear throughout auscultation bilaterally without adventitious sounds  Vascular: Pulses are full and equal. There is no venous stasis disease.   Abd: soft, nontender, nondistended, with good bowel sounds, no pulsatile masses  Ext: warm, no edema, calves supple/nontender, pulses 2+ bilaterally  Skin: pale, warm and dry, no rashes, cyanosis, jaundice, ecchymoses or evidence of skin breakdown  Psychiatric: Normal mood and affect  Neurologic: Alert and oriented X 3, no focal deficit noted    Labs:   Recent Labs     11/30/20  0940 11/30/20  0612 11/29/20  1654   NA  --  137* 138   K  --  3.6 3.5   MG  --  2.1 1.5*   BUN  --  22 24*   CREA --  1.04* 1.20*   GLU  --  136* 145*   WBC 5.3  --  7.6   HGB 7.8*  --  8.7*   HCT 25.3*  --  28.3*     --  221   CHOL  --  150  --    HDL  --  30*  --    CHHD  --  5.0  --    LDLC  --  88.2  --    VLDL  --  31.8*  --        Lab Results   Component Value Date/Time    Cholesterol, total 150 11/30/2020 06:12 AM    HDL Cholesterol 30 (L) 11/30/2020 06:12 AM    LDL, calculated 88.2 11/30/2020 06:12 AM    VLDL, calculated 31.8 (H) 11/30/2020 06:12 AM    Triglyceride 159 (H) 11/30/2020 06:12 AM    CHOL/HDL Ratio 5.0 11/30/2020 06:12 AM       Assessment:     Principal Problem:    Chest pain (11/29/2020)  Active Problems:    Hypertension ()    Cancer (HCC) ()    CKD (chronic kidney disease), stage III (12/10/2013)    Dyslipidemia (12/10/2013)    Iron deficiency anemia (3/21/2017)    Coronary atherosclerosis of native coronary vessel (3/21/2017)    Nonrheumatic aortic valve stenosis (10/15/2020)    Hypomagnesemia (11/29/2020)    Syncope and collapse (11/29/2020)        Plan: Vickimiguel Bobo is to see preoperative teaching film that thoroughly discusses procedure, risks, and possible complications. Risks, benefits, and alternatives were discussed to include, but not limited to:     1. Bleeding  2. Arrhythmia   3. Infection including mediastinitis  4. Myocardial infarction  5. Need for reoperation  6. Renal failure  7. Respiratory failure  8. Stroke  9. Potential death      Pt with smoldering multiple myeloma and iron deficiency anemia. She is followed by Dr. Ni Varma. She feels weak and fatigued most of the time now. She is concerned about trying to recover from open heart surgery. Dr. Gay Najjar will personally view the cardiac catheterization films. Repeat echocardiogram is pending. STS Mortality risk for CABG surgery is 2%. Awaiting GI consult as well as pt was scheduled to undergo capsule endoscopy.        Ray Waterman PA-C

## 2020-11-30 NOTE — ED NOTES
Took bedside report from Quique Redmond [employer] pt awaiting transfer to . Provided water per pt request with md approval.  Daughter at bedside.

## 2020-12-01 ENCOUNTER — ANESTHESIA (OUTPATIENT)
Dept: ENDOSCOPY | Age: 75
DRG: 233 | End: 2020-12-01
Payer: MEDICARE

## 2020-12-01 ENCOUNTER — ANESTHESIA EVENT (OUTPATIENT)
Dept: ENDOSCOPY | Age: 75
DRG: 233 | End: 2020-12-01
Payer: MEDICARE

## 2020-12-01 PROBLEM — I21.4 NSTEMI (NON-ST ELEVATED MYOCARDIAL INFARCTION) (HCC): Status: ACTIVE | Noted: 2020-11-29

## 2020-12-01 LAB
ANION GAP SERPL CALC-SCNC: 7 MMOL/L (ref 7–16)
BUN SERPL-MCNC: 17 MG/DL (ref 8–23)
CALCIUM SERPL-MCNC: 9.1 MG/DL (ref 8.3–10.4)
CHLORIDE SERPL-SCNC: 108 MMOL/L (ref 98–107)
CO2 SERPL-SCNC: 22 MMOL/L (ref 21–32)
CREAT SERPL-MCNC: 1.09 MG/DL (ref 0.6–1)
ERYTHROCYTE [DISTWIDTH] IN BLOOD BY AUTOMATED COUNT: 15.7 % (ref 11.9–14.6)
GLUCOSE SERPL-MCNC: 134 MG/DL (ref 65–100)
HCT VFR BLD AUTO: 23.3 % (ref 35.8–46.3)
HGB BLD-MCNC: 7.2 G/DL (ref 11.7–15.4)
HISTORY CHECKED?,CKHIST: NORMAL
MAGNESIUM SERPL-MCNC: 2 MG/DL (ref 1.8–2.4)
MCH RBC QN AUTO: 28.5 PG (ref 26.1–32.9)
MCHC RBC AUTO-ENTMCNC: 30.9 G/DL (ref 31.4–35)
MCV RBC AUTO: 92.1 FL (ref 79.6–97.8)
NRBC # BLD: 0 K/UL (ref 0–0.2)
PLATELET # BLD AUTO: 166 K/UL (ref 150–450)
PMV BLD AUTO: 10.7 FL (ref 9.4–12.3)
POTASSIUM SERPL-SCNC: 3.4 MMOL/L (ref 3.5–5.1)
RBC # BLD AUTO: 2.53 M/UL (ref 4.05–5.2)
SODIUM SERPL-SCNC: 137 MMOL/L (ref 138–145)
WBC # BLD AUTO: 4.9 K/UL (ref 4.3–11.1)

## 2020-12-01 PROCEDURE — 74011250637 HC RX REV CODE- 250/637: Performed by: NURSE PRACTITIONER

## 2020-12-01 PROCEDURE — 76040000025: Performed by: INTERNAL MEDICINE

## 2020-12-01 PROCEDURE — 74011250636 HC RX REV CODE- 250/636: Performed by: INTERNAL MEDICINE

## 2020-12-01 PROCEDURE — 74011250636 HC RX REV CODE- 250/636: Performed by: NURSE ANESTHETIST, CERTIFIED REGISTERED

## 2020-12-01 PROCEDURE — 74011250637 HC RX REV CODE- 250/637: Performed by: INTERNAL MEDICINE

## 2020-12-01 PROCEDURE — 99232 SBSQ HOSP IP/OBS MODERATE 35: CPT | Performed by: INTERNAL MEDICINE

## 2020-12-01 PROCEDURE — 65660000000 HC RM CCU STEPDOWN

## 2020-12-01 PROCEDURE — 76060000032 HC ANESTHESIA 0.5 TO 1 HR: Performed by: INTERNAL MEDICINE

## 2020-12-01 PROCEDURE — 83735 ASSAY OF MAGNESIUM: CPT

## 2020-12-01 PROCEDURE — 2709999900 HC NON-CHARGEABLE SUPPLY: Performed by: INTERNAL MEDICINE

## 2020-12-01 PROCEDURE — 36430 TRANSFUSION BLD/BLD COMPNT: CPT

## 2020-12-01 PROCEDURE — 74011250636 HC RX REV CODE- 250/636: Performed by: ANESTHESIOLOGY

## 2020-12-01 PROCEDURE — 80048 BASIC METABOLIC PNL TOTAL CA: CPT

## 2020-12-01 PROCEDURE — 77030014179 HC APPL CLP RESOL BSC -C: Performed by: INTERNAL MEDICINE

## 2020-12-01 PROCEDURE — 0W3P8ZZ CONTROL BLEEDING IN GASTROINTESTINAL TRACT, VIA NATURAL OR ARTIFICIAL OPENING ENDOSCOPIC: ICD-10-PCS | Performed by: INTERNAL MEDICINE

## 2020-12-01 PROCEDURE — 36415 COLL VENOUS BLD VENIPUNCTURE: CPT

## 2020-12-01 PROCEDURE — 74011000250 HC RX REV CODE- 250: Performed by: NURSE ANESTHETIST, CERTIFIED REGISTERED

## 2020-12-01 PROCEDURE — P9040 RBC LEUKOREDUCED IRRADIATED: HCPCS

## 2020-12-01 PROCEDURE — 85027 COMPLETE CBC AUTOMATED: CPT

## 2020-12-01 PROCEDURE — 74011250636 HC RX REV CODE- 250/636: Performed by: NURSE PRACTITIONER

## 2020-12-01 RX ORDER — PROPOFOL 10 MG/ML
INJECTION, EMULSION INTRAVENOUS AS NEEDED
Status: DISCONTINUED | OUTPATIENT
Start: 2020-12-01 | End: 2020-12-01 | Stop reason: HOSPADM

## 2020-12-01 RX ORDER — PROPOFOL 10 MG/ML
INJECTION, EMULSION INTRAVENOUS
Status: DISCONTINUED | OUTPATIENT
Start: 2020-12-01 | End: 2020-12-01 | Stop reason: HOSPADM

## 2020-12-01 RX ORDER — LIDOCAINE HYDROCHLORIDE 20 MG/ML
INJECTION, SOLUTION EPIDURAL; INFILTRATION; INTRACAUDAL; PERINEURAL AS NEEDED
Status: DISCONTINUED | OUTPATIENT
Start: 2020-12-01 | End: 2020-12-01 | Stop reason: HOSPADM

## 2020-12-01 RX ORDER — SODIUM CHLORIDE, SODIUM LACTATE, POTASSIUM CHLORIDE, CALCIUM CHLORIDE 600; 310; 30; 20 MG/100ML; MG/100ML; MG/100ML; MG/100ML
1000 INJECTION, SOLUTION INTRAVENOUS CONTINUOUS
Status: DISCONTINUED | OUTPATIENT
Start: 2020-12-01 | End: 2020-12-01 | Stop reason: HOSPADM

## 2020-12-01 RX ORDER — ROSUVASTATIN CALCIUM 20 MG/1
20 TABLET, COATED ORAL
Status: DISCONTINUED | OUTPATIENT
Start: 2020-12-01 | End: 2020-12-10 | Stop reason: HOSPADM

## 2020-12-01 RX ORDER — SODIUM CHLORIDE 9 MG/ML
250 INJECTION, SOLUTION INTRAVENOUS AS NEEDED
Status: DISCONTINUED | OUTPATIENT
Start: 2020-12-01 | End: 2020-12-04

## 2020-12-01 RX ORDER — FUROSEMIDE 10 MG/ML
40 INJECTION INTRAMUSCULAR; INTRAVENOUS ONCE
Status: COMPLETED | OUTPATIENT
Start: 2020-12-01 | End: 2020-12-01

## 2020-12-01 RX ADMIN — PROPOFOL 50 MG: 10 INJECTION, EMULSION INTRAVENOUS at 13:23

## 2020-12-01 RX ADMIN — ROSUVASTATIN CALCIUM 20 MG: 20 TABLET, COATED ORAL at 21:39

## 2020-12-01 RX ADMIN — SODIUM CHLORIDE 75 ML/HR: 900 INJECTION, SOLUTION INTRAVENOUS at 03:35

## 2020-12-01 RX ADMIN — NITROGLYCERIN 1 INCH: 20 OINTMENT TOPICAL at 06:09

## 2020-12-01 RX ADMIN — ASPIRIN 81 MG: 81 TABLET ORAL at 06:10

## 2020-12-01 RX ADMIN — NITROGLYCERIN 1 INCH: 20 OINTMENT TOPICAL at 18:18

## 2020-12-01 RX ADMIN — NITROGLYCERIN 1 INCH: 20 OINTMENT TOPICAL at 00:21

## 2020-12-01 RX ADMIN — ACETAMINOPHEN 325 MG: 325 TABLET, FILM COATED ORAL at 21:39

## 2020-12-01 RX ADMIN — FUROSEMIDE 40 MG: 10 INJECTION, SOLUTION INTRAMUSCULAR; INTRAVENOUS at 08:33

## 2020-12-01 RX ADMIN — PROPOFOL 120 MCG/KG/MIN: 10 INJECTION, EMULSION INTRAVENOUS at 13:23

## 2020-12-01 RX ADMIN — DIPHENHYDRAMINE HYDROCHLORIDE 25 MG: 25 CAPSULE ORAL at 11:11

## 2020-12-01 RX ADMIN — LISINOPRIL 20 MG: 20 TABLET ORAL at 08:33

## 2020-12-01 RX ADMIN — ACETAMINOPHEN 325 MG: 325 TABLET, FILM COATED ORAL at 08:44

## 2020-12-01 RX ADMIN — PANTOPRAZOLE SODIUM 40 MG: 40 TABLET, DELAYED RELEASE ORAL at 08:33

## 2020-12-01 RX ADMIN — ACETAMINOPHEN 325 MG: 325 TABLET, FILM COATED ORAL at 00:21

## 2020-12-01 RX ADMIN — SODIUM CHLORIDE, SODIUM LACTATE, POTASSIUM CHLORIDE, AND CALCIUM CHLORIDE: 600; 310; 30; 20 INJECTION, SOLUTION INTRAVENOUS at 13:17

## 2020-12-01 RX ADMIN — LIDOCAINE HYDROCHLORIDE 60 MG: 20 INJECTION, SOLUTION EPIDURAL; INFILTRATION; INTRACAUDAL; PERINEURAL at 13:23

## 2020-12-01 RX ADMIN — Medication 10 ML: at 15:01

## 2020-12-01 RX ADMIN — ACETAMINOPHEN 325 MG: 325 TABLET, FILM COATED ORAL at 11:11

## 2020-12-01 NOTE — PROGRESS NOTES
rounded on patient with nurse. Interpreting services offered when needed. [facility] staff  available over the phone from 3:30 p.m. - midnight. Please call Maynor at [phone] with any interpreting requests.       [address]  Language Services Department  [address]  [address]  [phone] (phone)

## 2020-12-01 NOTE — ROUTINE PROCESS
Bedside and verbal shift change report given to Hilda Sarah, RN (oncoming nurse) by self (offgoing nurse). Report included the following information SBAR, Kardex, Intake/Output, MAR, Recent Results, and Cardiac Rhythm NSR.

## 2020-12-01 NOTE — CONSULTS
[facility] Consult Note       Referring Physician:  Dr. Ha Luna Date:  11/30/2020    Admit Date:  11/29/2020    Chief Complaint: TAYLOR    Subjective:     History of Present Illness:  Patient is a 67 y.o. h/o CAD, PAD, HTN, HLD, and smoldering multiple myeloma who is seen in consultation at the request of Dr. Sin Bland for TAYLOR. Patient presented following onset of chest pain and syncope. Troponin elevated consistent with NSTEMI.  2D ECHO on 11/29/20 with LVEF 40-45% and hypokinesis of basal-mid inferolateral and apical walls. Mild aortic stenosis. [facility] performed 11/30/20 with multivessel disease. Intervention was not performed. She has a long history of TAYLOR with occult blood loss. She has undergone multiple EGD/colonoscopy exams with findings of esophagitis, colon polyps, diverticulosis, and hemorrhoids. Post recent EGD/colonoscopy performed 11/17/20 with finding of 7 mm rectal polyp (hyperplastic), diverticulosis, and internal/external hemorrhoids. She was scheduled for capsule endoscopy that has yet to be completed. Prior capsule endoscopy performed in 2010 with finding of small bowel AVM (ileum). Hgb at time of admission was 8.7 on 11/29/20. Hgb trended down to 7.8 on 11/30/20 which is likely in part dilutional.  MCV is WNL. RDW is elevated at 15.7. She reports approximately one episode of melena lasting 1-3 days each month. Negative for BRBPR and abdominal pain. Prior extensive work-up only significant for small bowel AVM. She has required pRBC transfusion x1 per year in the last 2 years. She receives IV iron regularly. +SOB and fatigue with minimal activity. CXR was clear. CT head was negative for acute abnormality. ROS is otherwise negative. She has not been transfused this admission. 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 Endoclip x1. Capsule endoscopy (2010);  Bleeding AVM of distal ileum    PMH:  Past Medical History:   Diagnosis Date    Anemia     Mcneil's esophagus     Coronary atherosclerosis of native coronary vessel     no stents, no CABG, increased calcium score    GERD (gastroesophageal reflux disease)     daily meds    HLD (hyperlipidemia)     Hypercholesterolemia     Hypertension     Mild aortic stenosis     echo: 3/24/17  GINETTE 1.7 cm2, peak velocity 2.2 m/s, mean pressure gradient 10 mmHg, peak pressure gradient 212 mm Hg.  Occlusion and stenosis of carotid artery without mention of cerebral infarction     Asymptomatic bilateral 50-69%    Osteoarthritis     feet    [facility]AD [facility]peripheral artery [facility]     PONV (postoperative nausea and vomiting)     S/P insertion of iliac artery stent     left    Shingles        PSH:  Past Surgical History:   Procedure Laterality Date    HX ANGIOPLASTY      Left common iliac artery with stent    HX APPENDECTOMY      HX CAROTID ENDARTERECTOMY Left 03/30/2017    HX CAROTID ENDARTERECTOMY Right 05/15/2017    HX COLONOSCOPY      for GI bleed    HX ENDOSCOPY      multiple scopes for upper GI bleed    HX HYSTERECTOMY      HX ORTHOPAEDIC      lumbar SURGERY SPINAL STENOSIS    HX TONSILLECTOMY      VASCULAR SURGERY PROCEDURE UNLIST Left 2006    iliac artery stent       Allergies: Allergies   Allergen Reactions    Toprol Xl [Metoprolol Succinate] Swelling     Pt states extremity swelling when taking this medication       Home Medications:  Prior to Admission medications    Medication Sig Start Date End Date Taking? Authorizing Provider   nitroglycerin (NITROSTAT) 0.4 mg SL tablet 1 Tab by SubLINGual route every five (5) minutes as needed for Chest Pain. Up to 3 doses. 10/15/20  Yes Lucy Dugan MD   hydroCHLOROthiazide 25 mg tab 25 mg, lisinopril 20 mg tab 20 mg Take  by mouth daily. Yes Provider, Historical   rosuvastatin (CRESTOR) 5 mg tablet Take  by mouth nightly.    Yes Provider, Historical   acetaminophen (TYLENOL) 325 mg tablet Take 325 mg by mouth as needed for Pain. Yes Provider, Historical   cholecalciferol, vitamin D3, (VITAMIN D3) 2,000 unit tab Take 2,000 Units by mouth. Couple times a week   Yes Provider, Historical   vitamin E (AQUA GEMS) 400 unit capsule Take 400 Units by mouth. Couple times a week   Yes Provider, Historical   aspirin delayed-release (ASPIRIN EC) 81 mg tablet Take 81 mg by mouth every morning. Yes Provider, Historical   esomeprazole (NEXIUM) 40 mg capsule Take 20 mg by mouth every morning. Yes Provider, Historical   ASCORBIC ACID (VITAMIN C PO) Take 1 Tab by mouth.  Couple of times a week   Yes Provider, Historical       Hospital Medications:  Current Facility-Administered Medications   Medication Dose Route Frequency    heparin (PF) 2 units/ml in NS infusion  3 mL/hr IntraVENous CONTINUOUS    midazolam (VERSED) injection 0.5-2 mg  0.5-2 mg IntraVENous Multiple    fentaNYL citrate (PF) injection 25-50 mcg  25-50 mcg IntraVENous Multiple    acetaminophen (TYLENOL) tablet 325 mg  325 mg Oral Q6H PRN    aspirin delayed-release tablet 81 mg  81 mg Oral 7am    pantoprazole (PROTONIX) tablet 40 mg  40 mg Oral ACB    rosuvastatin (CRESTOR) tablet 5 mg  5 mg Oral QHS    sodium chloride (NS) flush 5-40 mL  5-40 mL IntraVENous Q8H    sodium chloride (NS) flush 5-40 mL  5-40 mL IntraVENous PRN    nitroglycerin (NITROBID) 2 % ointment 1 Inch  1 Inch Topical Q6H    nitroglycerin (NITROSTAT) tablet 0.4 mg  0.4 mg SubLINGual Q5MIN PRN    morphine injection 2 mg  2 mg IntraVENous Q4H PRN    naloxone (NARCAN) injection 0.4 mg  0.4 mg IntraVENous PRN    ondansetron (ZOFRAN) injection 4 mg  4 mg IntraVENous Q4H PRN    alum-mag hydroxide-simeth (MYLANTA) oral suspension 30 mL  30 mL Oral Q4H PRN    diphenhydrAMINE (BENADRYL) capsule 25 mg  25 mg Oral Q6H PRN    docusate sodium (COLACE) capsule 100 mg  100 mg Oral BID PRN    0.9% sodium chloride infusion  75 mL/hr IntraVENous CONTINUOUS  lisinopriL (PRINIVIL, ZESTRIL) tablet 20 mg  20 mg Oral DAILY       Social History:  Social History     Tobacco Use    Smoking status: Former Smoker     Last attempt to quit: 1977     Years since quittin.2    Smokeless tobacco: Never Used   Substance Use Topics    Alcohol use: No       Family History:  Family History   Problem Relation Age of Onset    Hypertension Father     Stroke Father     Dementia Mother        Review of Systems:  A detailed 10 system ROS is obtained, with pertinent positives as listed above. All others are negative. Diet:  Cardiac diet    Objective:     Physical Exam:  Vitals:  Visit Vitals  /61   Pulse 92   Temp 98.8 °F (37.1 °C)   Resp 18   Wt 74.4 kg (164 lb)   SpO2 95%   BMI 28.15 kg/m²     Gen:  NAD  HEENT: Sclerae anicteric. MMM  Cardiovascular: Regular rate and rhythm. Respiratory:  CTA bilaterally  GI:  Soft, NT, ND, +BS  Rectal:  Deferred  Neurological:  AAOx3    Laboratory:    Recent Labs     20  0940 20  0612 20  1654   WBC 5.3  --  7.6   HGB 7.8*  --  8.7*   HCT 25.3*  --  28.3*     --  221   MCV 93.4  --  90.1   NA  --  137* 138   K  --  3.6 3.5   CL  --  107 105   CO2  --  24 24   BUN  --  22 24*   CREA  --  1.04* 1.20*   CA  --  9.3 9.6   MG  --  2.1 1.5*   GLU  --  136* 145*   AP  --   --  147*   ALB  --   --  3.9   TP  --   --  8.0          Assessment:       Principal Problem:    Chest pain (2020)    Active Problems:    Hypertension ()      Cancer (HCC) ()      Overview: MULTIPLE MYELOMA      CKD (chronic kidney disease), stage III (12/10/2013)      Dyslipidemia (12/10/2013)      Iron deficiency anemia (3/21/2017)      Overview:  :  Has requires frequent transfusions. No obvious source identified. Coronary atherosclerosis of native coronary vessel (3/21/2017)      Overview:       1. Cardiac  Calcium score (08) : Total: 218. LAD: 129, Lcx: 29,       RCA: 60      2.   Lexiscan Cardiolite (3/9/10): Apical thinning. No ischemia. Normal       LV function EF 68%. Nonrheumatic aortic valve stenosis (10/15/2020)      Overview: Echo (11/6/20):  EF 60-65%. +DD. Mild LAE. Aortic Stenosis:  MG 13.  PG       22.  DI 0.45. Mild MR/TR. Hypomagnesemia (11/29/2020)      Syncope and collapse (11/29/2020)        Plan:       1. Repeat CBC in AM.  Transfuse pRBC PRN to maintain Hgb of 8-9.  2. Given propensity for recurrent small bowel occult bleeding (small bowel AVM's), she would likely be best served to undergo CABG for multivessel CAD as opposed to [facility] with stent which would require a minimum of DAPT x6 months. 3. Will make NPO after midnight. After review of AM hgb, will discuss plan with CT surgery and Cards. Capsule endoscopy is limited to outpatient setting. Could consider push enteroscopy prior to CABG if determined acceptable from cardiac standpoint.       Will follow    Karo Hernandes MD

## 2020-12-01 NOTE — PROGRESS NOTES
[facility] PROGRESS NOTE           12/1/2020 7:35 AM    Admit Date: 11/29/2020      Subjective:   Patient with multivessel CAD on cath yesterday. Worsening anemia. Notes dyspnea on exertion. No chest pain. ROS:  Cardiovascular:  As noted above    Objective:      Vitals:    12/01/20 0021 12/01/20 0102 12/01/20 0454 12/01/20 0609   BP: (!) 123/56 (!) 115/53 (!) 101/41 (!) 118/53   Pulse: 85 89 82 85   Resp:  18 18    Temp:  98.6 °F (37 °C) 98.3 °F (36.8 °C)    SpO2:  94% 95%    Weight:   164 lb 12.8 oz (74.8 kg)        Physical Exam:  General-No Acute Distress  Neck- supple, no JVD  CV- regular rate and rhythm no MRG  Lung- clear bilaterally  Abd- soft, nontender, nondistended  Ext- no edema bilaterally. Skin- warm and dry      Data Review:   Recent Labs     12/01/20  0355 11/30/20  0940 11/30/20  0612   *  --  137*   K 3.4*  --  3.6   MG 2.0  --  2.1   BUN 17  --  22   CREA 1.09*  --  1.04*   *  --  136*   WBC 4.9 5.3  --    HGB 7.2* 7.8*  --    HCT 23.3* 25.3*  --     183  --    TRIGL  --   --  159*   HDL  --   --  30*      No results found for: CARLY Rascon    Assessment/Plan:     Principal Problem:    NSTEMI (non-ST elevated myocardial infarction) ([facility] [address] (11/29/2020)    Multivessel disease. Not able to tolerate DAPT therapy. Will need CABG. CT surgery consulted. Active Problems:    Hypertension ()    BP acceptable. CKD (chronic kidney disease), stage III (12/10/2013)    Renal function stable. Dyslipidemia (12/10/2013)    Continue Crestor. Increase dose to 20 mg. Iron deficiency anemia (3/21/2017)    Worse. Transfuse 2 units today. Defer to GI if push enteroscopy would be helpful. Stable for procedure from CV standpoint. Coronary atherosclerosis of native coronary vessel (3/21/2017)    See above. Nonrheumatic aortic valve stenosis (10/15/2020)    Mild per echo.          Syncope and collapse (11/29/2020) ? Etiology. No arrhythmias on monitor. Continue to monitor on telemetry.                    Danyell Faith MD  12/1/2020 7:35 AM

## 2020-12-01 NOTE — ROUTINE PROCESS
TRANSFER - IN REPORT: 
 
Verbal report received from GI Lab RN on Audelia Zepeda being received from GI Lab (unit) for routine progression of care. Report consisted of patients Situation, Background, Assessment and Recommendations(SBAR). Information from the following report(s) SBAR, Kardex, Procedure Summary, MAR and Cardiac Rhythm NSR was reviewed. Opportunity for questions and clarification was provided. Assessment completed upon patients arrival to unit and care assumed. Patient received to room 311. Patient connected to monitor and assessment completed. Plan of care reviewed. Patient oriented to room and call light. Patient aware to use call light to communicate any chest pain or needs.

## 2020-12-01 NOTE — ANESTHESIA POSTPROCEDURE EVALUATION
Procedure(s):  ESOPHAGOGASTRODUODENOSCOPY (EGD) with push enteroscopy rm 311  ENDOSCOPIC BANDING OR LIGATION. total IV anesthesia    Anesthesia Post Evaluation      Multimodal analgesia: multimodal analgesia used between 6 hours prior to anesthesia start to PACU discharge  Patient location during evaluation: bedside  Patient participation: complete - patient participated  Level of consciousness: awake  Pain management: adequate  Airway patency: patent  Anesthetic complications: no  Cardiovascular status: acceptable and stable  Respiratory status: acceptable and room air  Hydration status: acceptable  Post anesthesia nausea and vomiting:  none  Final Post Anesthesia Temperature Assessment:  Normothermia (36.0-37.5 degrees C)      INITIAL Post-op Vital signs:   Vitals Value Taken Time   /104 12/1/2020  1:50 PM   Temp 36 °C (96.8 °F) 12/1/2020  1:50 PM   Pulse 74 12/1/2020  1:58 PM   Resp 16 12/1/2020  1:50 PM   SpO2 98 % 12/1/2020  1:58 PM   Vitals shown include unvalidated device data.

## 2020-12-01 NOTE — ROUTINE PROCESS
TRANSFER - OUT REPORT: 
 
Verbal report given on Audelia Zepeda  being transferred to 3rd floor(unit) for routine post - op Report consisted of patients Situation, Background, Assessment and  
Recommendations(SBAR). Information from the following report(s) Kardex, Procedure summary, MAR, I/O was reviewed with the receiving nurse. Lines:  
Peripheral IV 11/29/20 Right Antecubital (Active) Site Assessment Clean, dry, & intact 12/01/20 1200 Phlebitis Assessment 0 12/01/20 1200 Infiltration Assessment 0 12/01/20 1200 Dressing Status Clean, dry, & intact 12/01/20 1200 Dressing Type Tape;Transparent 12/01/20 1200 Hub Color/Line Status Patent 12/01/20 1200 Alcohol Cap Used No 12/01/20 1200 Peripheral IV 12/01/20 Left Arm (Active) Site Assessment Clean, dry, & intact 12/01/20 1232 Phlebitis Assessment 0 12/01/20 1232 Infiltration Assessment 0 12/01/20 1232 Dressing Status Clean, dry, & intact 12/01/20 1232 Dressing Type Transparent 12/01/20 1232 Hub Color/Line Status Patent 12/01/20 1200 Alcohol Cap Used No 12/01/20 1200 Opportunity for questions and clarification was provided. Patient transported with: 
 [employer]

## 2020-12-01 NOTE — H&P
.  Gastroenterology Outpatient History and Physical    Patient: Tracyharine Mcardle    Physician: Noemi Elena MD    Vital Signs: Blood pressure (!) 146/68, pulse 82, temperature 98.2 °F (36.8 °C), resp. rate 16, weight 74.8 kg (164 lb 12.8 oz), SpO2 96 %. Allergies: Allergies   Allergen Reactions    Toprol Xl [Metoprolol Succinate] Swelling     Pt states extremity swelling when taking this medication       Chief Complaint:    67year old known female patient with CAD and NSTEMI with plans for intervention by Dr. Ilia Blankenship, chronic TAYLOR with history of small bowel AVMs. and smoldering multiple myeloma who we are following for anemia and bleeding. Hgb 7.2 on 12/1 despite PRBCs. Has 2 more units scheduled. Plan for EGD with push enteroscopy today prior to CABG. Cardiology clearance obtained by Dr. Ilia Blankenship. History of Present Illness: As Above    Justification for Procedure: As Above    History:  Past Medical History:   Diagnosis Date    Anemia     Mcneil's esophagus     Coronary atherosclerosis of native coronary vessel     no stents, no CABG, increased calcium score    GERD (gastroesophageal reflux disease)     daily meds    HLD (hyperlipidemia)     Hypercholesterolemia     Hypertension     Mild aortic stenosis     echo: 3/24/17  GINETTE 1.7 cm2, peak velocity 2.2 m/s, mean pressure gradient 10 mmHg, peak pressure gradient 212 mm Hg.      Occlusion and stenosis of carotid artery without mention of cerebral infarction     Asymptomatic bilateral 50-69%    Osteoarthritis     feet    [facility]     PONV (postoperative nausea and vomiting)     S/P insertion of iliac artery stent     left    Shingles       Past Surgical History:   Procedure Laterality Date    HX ANGIOPLASTY      Left common iliac artery with stent    HX APPENDECTOMY      HX CAROTID ENDARTERECTOMY Left 03/30/2017    HX CAROTID ENDARTERECTOMY Right 05/15/2017    HX COLONOSCOPY      for GI bleed    HX ENDOSCOPY      multiple scopes for upper GI bleed    HX HYSTERECTOMY      HX ORTHOPAEDIC      lumbar SURGERY SPINAL STENOSIS    HX TONSILLECTOMY      VASCULAR SURGERY PROCEDURE UNLIST Left 2006    iliac artery stent      Social History     Socioeconomic History    Marital status:      Spouse name: Not on file    Number of children: Not on file    Years of education: Not on file    Highest education level: Not on file   Tobacco Use    Smoking status: Former Smoker     Last attempt to quit: 1977     Years since quittin.2    Smokeless tobacco: Never Used   Substance and Sexual Activity    Alcohol use: No    Drug use: No      Family History   Problem Relation Age of Onset    Hypertension Father     Stroke Father     Dementia Mother        Medications:   Prior to Admission medications    Medication Sig Start Date End Date Taking? Authorizing Provider   nitroglycerin (NITROSTAT) 0.4 mg SL tablet 1 Tab by SubLINGual route every five (5) minutes as needed for Chest Pain. Up to 3 doses. 10/15/20  Yes Lucy Dugan MD   hydroCHLOROthiazide 25 mg tab 25 mg, lisinopril 20 mg tab 20 mg Take  by mouth daily. Yes Provider, Historical   rosuvastatin (CRESTOR) 5 mg tablet Take  by mouth nightly. Yes Provider, Historical   acetaminophen (TYLENOL) 325 mg tablet Take 325 mg by mouth as needed for Pain. Yes Provider, Historical   cholecalciferol, vitamin D3, (VITAMIN D3) 2,000 unit tab Take 2,000 Units by mouth. Couple times a week   Yes Provider, Historical   vitamin E (AQUA GEMS) 400 unit capsule Take 400 Units by mouth. Couple times a week   Yes Provider, Historical   aspirin delayed-release (ASPIRIN EC) 81 mg tablet Take 81 mg by mouth every morning. Yes Provider, Historical   esomeprazole (NEXIUM) 40 mg capsule Take 20 mg by mouth every morning. Yes Provider, Historical   ASCORBIC ACID (VITAMIN C PO) Take 1 Tab by mouth.  Couple of times a week   Yes Provider, Historical Physical Exam:         Heart: regular rate and rhythm, S1, S2 normal, no murmur, click, rub or gallop   Lungs: CTA   Abdominal: Bowel sounds are normal, liver is not enlarged, spleen is not enlarged           Findings/Diagnosis: GI blood loss anemia  Plan push enteroscopy        Signed:  Gabriela Weber MD 12/1/2020

## 2020-12-01 NOTE — ANESTHESIA PREPROCEDURE EVALUATION
Relevant Problems   No relevant active problems       Anesthetic History     PONV          Review of Systems / Medical History  Patient summary reviewed and pertinent labs reviewed    Pulmonary  Within defined limits                 Neuro/Psych              Cardiovascular    Hypertension: well controlled          CAD and hyperlipidemia    Exercise tolerance: <4 METS  Comments: Left ventricle: Systolic function was mildly to moderately reduced. Ejection  fraction was estimated in the range of 40 % to 45 %. There was hypokinesis of  the basal-mid inferolateral and apical wall(s).    -  Inferior vena cava, hepatic veins: The respirophasic change in diameter   was  less than 50%.    -  Aortic valve: The findings were most consistent with mild aortic stenosis.     Elevated troponin and multivessel CAD; concerning for NSTEMI; pt states she is candidate for CABG this week vs stent   GI/Hepatic/Renal     GERD: well controlled           Endo/Other        Arthritis and anemia     Other Findings   Comments: Pt has had multiple transfusions for intenstinal AVMs    Hx smoldering myeloma         Physical Exam    Airway  Mallampati: I  TM Distance: 4 - 6 cm  Neck ROM: normal range of motion   Mouth opening: Normal     Cardiovascular    Rhythm: regular  Rate: normal    Murmur     Dental  No notable dental hx       Pulmonary  Breath sounds clear to auscultation               Abdominal  GI exam deferred       Other Findings            Anesthetic Plan    ASA: 4  Anesthesia type: total IV anesthesia          Induction: Intravenous  Anesthetic plan and risks discussed with: Patient and Family

## 2020-12-01 NOTE — PROCEDURES
Push enteroscopy Procedure Note    Indications: GI blood loss anemia.     Anesthesia/Sedation: MAC IV     Pre-Procedure Physical:    Current Facility-Administered Medications   Medication Dose Route Frequency    0.9% sodium chloride infusion 250 mL  250 mL IntraVENous PRN    rosuvastatin (CRESTOR) tablet 20 mg  20 mg Oral QHS    lactated Ringers infusion 1,000 mL  1,000 mL IntraVENous CONTINUOUS    heparin (PF) 2 units/ml in NS infusion  3 mL/hr IntraVENous CONTINUOUS    midazolam (VERSED) injection 0.5-2 mg  0.5-2 mg IntraVENous Multiple    fentaNYL citrate (PF) injection 25-50 mcg  25-50 mcg IntraVENous Multiple    lip protectant (BLISTEX) ointment 1 Each  1 Each Topical PRN    acetaminophen (TYLENOL) tablet 325 mg  325 mg Oral Q6H PRN    aspirin delayed-release tablet 81 mg  81 mg Oral 7am    pantoprazole (PROTONIX) tablet 40 mg  40 mg Oral ACB    sodium chloride (NS) flush 5-40 mL  5-40 mL IntraVENous Q8H    sodium chloride (NS) flush 5-40 mL  5-40 mL IntraVENous PRN    nitroglycerin (NITROBID) 2 % ointment 1 Inch  1 Inch Topical Q6H    nitroglycerin (NITROSTAT) tablet 0.4 mg  0.4 mg SubLINGual Q5MIN PRN    morphine injection 2 mg  2 mg IntraVENous Q4H PRN    naloxone (NARCAN) injection 0.4 mg  0.4 mg IntraVENous PRN    ondansetron (ZOFRAN) injection 4 mg  4 mg IntraVENous Q4H PRN    alum-mag hydroxide-simeth (MYLANTA) oral suspension 30 mL  30 mL Oral Q4H PRN    diphenhydrAMINE (BENADRYL) capsule 25 mg  25 mg Oral Q6H PRN    docusate sodium (COLACE) capsule 100 mg  100 mg Oral BID PRN    lisinopriL (PRINIVIL, ZESTRIL) tablet 20 mg  20 mg Oral DAILY      Toprol xl [metoprolol succinate]    Patient Vitals for the past 8 hrs:   BP Temp Pulse Resp SpO2 Height Weight   12/01/20 1350 (!) 138/98 96.8 °F (36 °C) 80 16 97 %     12/01/20 1317 (!) 185/88  82 16 98 %     12/01/20 1230 (!) 195/81 98.1 °F (36.7 °C) 81 13 98 %     12/01/20 1211      5' 4\" (1.626 m) 72.1 kg (159 lb)   12/01/20 1206 (!) 174/79 98.2 °F (36.8 °C) 77 18 99 %     12/01/20 1130 (!) 146/68 98.2 °F (36.8 °C) 82 16 96 %     12/01/20 1115 (!) 152/81 98 °F (36.7 °C) 84 16 97 %     12/01/20 0845 130/60 98 °F (36.7 °C) 87 14 97 %     12/01/20 0833 130/60  87       12/01/20 0609 (!) 118/53  80           Exam      Airway: clear   Heart: normal S1and S2    Lungs: clear bilateral  Abdomen: soft, nontender, bowel sounds present and normal in all quads   Mental Status: awake, alert and oriented to person, place and time          Procedure Details     Informed consent was obtained for the procedure, including conscious sedation. Risks of pancreatitis, infection, perforation, hemorrhage, adverse drug reaction and aspiration were discussed. The patient was placed in the left lateral decubitus position. Based on the pre-procedure assessment, including review of the patient's medical history, medications, allergies, and review of systems, she had been deemed to be an appropriate candidate for conscious sedation; she was therefore sedated with the medications listed below. She was monitored continuously with ECG tracing, pulse oximetry, blood pressure monitoring, and direct observation. The pediatric colonoscope was inserted into the mouth and advanced under direct vision to the proximal jejunum. Findings:   Esophagus- Normal.  Stomach- Normal.  Duodenum- Normal.  Jejunum-In the very proximal jejunum at about 110 cm from the incisors was a small AVM. On the way back there was mild bleeding at the AVM. Two Endo Clips were deployed over the AVM and bleeding stopped. No other abnormalities or bleeding were was seen as far as the scope could reach. Therapies: Endo Clips    Specimens: None    Estimated Blood Loss: 1 cc           Complications:   None; patient tolerated the procedure well. Attending Attestation:  I performed the procedure.      Impression:  SB AVM with some bleeding endo clipped      Recommendations:  Start clears. Monitor sx and hgb etc. Otherwise as per Cardiology and CV surgery.     Denana Block MD

## 2020-12-01 NOTE — PROGRESS NOTES
[facility] Progress Note         Admit Date:  11/29/2020    Today's Date:  12/1/2020    CC:  Melena and anemia    Subjective:     Patient: Had a \"dark\" BM today, \"but not as black. \" Denies any abdominal pain, N&V. She has been NPO since midnight    Medications:   Current Facility-Administered Medications   Medication Dose Route Frequency    0.9% sodium chloride infusion 250 mL  250 mL IntraVENous PRN    rosuvastatin (CRESTOR) tablet 20 mg  20 mg Oral QHS    heparin (PF) 2 units/ml in NS infusion  3 mL/hr IntraVENous CONTINUOUS    midazolam (VERSED) injection 0.5-2 mg  0.5-2 mg IntraVENous Multiple    fentaNYL citrate (PF) injection 25-50 mcg  25-50 mcg IntraVENous Multiple    lip protectant (BLISTEX) ointment 1 Each  1 Each Topical PRN    acetaminophen (TYLENOL) tablet 325 mg  325 mg Oral Q6H PRN    aspirin delayed-release tablet 81 mg  81 mg Oral 7am    pantoprazole (PROTONIX) tablet 40 mg  40 mg Oral ACB    sodium chloride (NS) flush 5-40 mL  5-40 mL IntraVENous Q8H    sodium chloride (NS) flush 5-40 mL  5-40 mL IntraVENous PRN    nitroglycerin (NITROBID) 2 % ointment 1 Inch  1 Inch Topical Q6H    nitroglycerin (NITROSTAT) tablet 0.4 mg  0.4 mg SubLINGual Q5MIN PRN    morphine injection 2 mg  2 mg IntraVENous Q4H PRN    naloxone (NARCAN) injection 0.4 mg  0.4 mg IntraVENous PRN    ondansetron (ZOFRAN) injection 4 mg  4 mg IntraVENous Q4H PRN    alum-mag hydroxide-simeth (MYLANTA) oral suspension 30 mL  30 mL Oral Q4H PRN    diphenhydrAMINE (BENADRYL) capsule 25 mg  25 mg Oral Q6H PRN    docusate sodium (COLACE) capsule 100 mg  100 mg Oral BID PRN    lisinopriL (PRINIVIL, ZESTRIL) tablet 20 mg  20 mg Oral DAILY       Review of Systems:  ROS was obtained, with pertinent positives as listed above. No chest pain or SOB.     Diet:  NPO    Objective:   Vitals:  Visit Vitals  /60 (BP 1 Location: Left arm)   Pulse 87   Temp 98 °F (36.7 °C)   Resp 14   Wt 74.8 kg (164 lb 12.8 oz)   SpO2 97%   BMI 28.29 kg/m²     Intake/Output:  No intake/output data recorded. 11/29 1901 - 12/01 0700  In: 56 [P.O.:200; I.V.:430]  Out: 1050 [Urine:1050]  Exam:  General appearance: alert, cooperative, no distress Daughter and nursing at bedside  Lungs: clear to auscultation bilaterally anteriorly  Heart: regular rate and rhythm GRADE III/VI MURMUR  Abdomen: soft, non-tender. Bowel sounds normal. No masses, no organomegaly  Extremities: extremities normal, atraumatic, no cyanosis or edema  Neuro:  alert and oriented X4    Data Review (Labs):    Recent Labs     12/01/20  0355 11/30/20  0940 11/30/20  0612 11/29/20  1654   WBC 4.9 5.3  --  7.6   HGB 7.2* 7.8*  --  8.7*   HCT 23.3* 25.3*  --  28.3*    183  --  221   MCV 92.1 93.4  --  90.1   *  --  137* 138   K 3.4*  --  3.6 3.5   *  --  107 105   CO2 22  --  24 24   BUN 17  --  22 24*   CREA 1.09*  --  1.04* 1.20*   CA 9.1  --  9.3 9.6   MG 2.0  --  2.1 1.5*   *  --  136* 145*   AP  --   --   --  147*   ALT  --   --   --  28   TBILI  --   --   --  0.4   ALB  --   --   --  3.9   TP  --   --   --  8.0       Assessment:     Principal Problem:    NSTEMI (non-ST elevated myocardial infarction) ([facility] [address] (11/29/2020)    Active Problems:    Hypertension ()      CKD (chronic kidney disease), stage III (12/10/2013)      Dyslipidemia (12/10/2013)      Iron deficiency anemia (3/21/2017)      Overview:  :  Has requires frequent transfusions. No obvious source identified. Coronary atherosclerosis of native coronary vessel (3/21/2017)      Overview:       1. Cardiac  Calcium score (12/28/08) : Total: 218. LAD: 129, Lcx: 29,       RCA: 60      2. Lexiscan Cardiolite (3/9/10):  Apical thinning. No ischemia. Normal       LV function EF 68%. Nonrheumatic aortic valve stenosis (10/15/2020)      Overview: Echo (11/6/20):  EF 60-65%. +DD. Mild LAE. Aortic Stenosis:  MG 13.  PG       22.  DI 0.45. Mild MR/TR. Hypomagnesemia (11/29/2020)      Syncope and collapse (11/29/2020)    67year old known female patient with CAD and NSTEMI with plans for intervention by Dr. Bobby Olivares, chronic TAYLOR with history of small bowel AVMs. and smoldering multiple myeloma who we are following for anemia and bleeding. Hgb 7.2 on 12/1 despite PRBCs. Has 2 more units scheduled. Plan for EGD with push enteroscopy today prior to CABG. Cardiology clearance obtained by Dr. Bobby Olivares. Plan:     -EGD with push enteroscopy today by Dr. Kala Anne. Risks and benefits discussed with patient to include risk of infection, bleeding, perforation, anesthesia and possible mortality. She verbalized understanding and wishes to proceed with EGD/push. -NPO until procedure  -Agree with blood transfusion  -Appreciate cardiology    Yamil Prieto.  Linda Lorenzoty in collaboration with Dr. Kala Anne  [facility]

## 2020-12-01 NOTE — ROUTINE PROCESS
Verbal bedside report received from Christina Singer, [facility]. Assumed care of patient.

## 2020-12-01 NOTE — ROUTINE PROCESS
Verbal bedside report given to [facility], oncoming RN. Patient's situation, background, assessment and recommendations provided. Opportunity for questions provided. Oncoming RN assumed care of patient.

## 2020-12-01 NOTE — ROUTINE PROCESS
Bedside and Verbal shift change report received from [address] [place] [address] Clarence RN (offgoing nurse) to self (oncoming nurse). Report included the following information SBAR, Kardex, Intake/Output, MAR, Recent Results, and Cardiac Rhythm NSR.

## 2020-12-02 LAB
ABO + RH BLD: NORMAL
ANION GAP SERPL CALC-SCNC: 7 MMOL/L (ref 7–16)
BLD PROD TYP BPU: NORMAL
BLD PROD TYP BPU: NORMAL
BLOOD GROUP ANTIBODIES SERPL: NORMAL
BPU ID: NORMAL
BPU ID: NORMAL
BUN SERPL-MCNC: 15 MG/DL (ref 8–23)
CALCIUM SERPL-MCNC: 9.3 MG/DL (ref 8.3–10.4)
CHLORIDE SERPL-SCNC: 106 MMOL/L (ref 98–107)
CO2 SERPL-SCNC: 25 MMOL/L (ref 21–32)
CREAT SERPL-MCNC: 1.04 MG/DL (ref 0.6–1)
CROSSMATCH RESULT,%XM: NORMAL
CROSSMATCH RESULT,%XM: NORMAL
ERYTHROCYTE [DISTWIDTH] IN BLOOD BY AUTOMATED COUNT: 17.2 % (ref 11.9–14.6)
GLUCOSE SERPL-MCNC: 128 MG/DL (ref 65–100)
HCT VFR BLD AUTO: 31.5 % (ref 35.8–46.3)
HGB BLD-MCNC: 10.5 G/DL (ref 11.7–15.4)
MAGNESIUM SERPL-MCNC: 1.8 MG/DL (ref 1.8–2.4)
MCH RBC QN AUTO: 28.6 PG (ref 26.1–32.9)
MCHC RBC AUTO-ENTMCNC: 33.3 G/DL (ref 31.4–35)
MCV RBC AUTO: 85.8 FL (ref 79.6–97.8)
NRBC # BLD: 0 K/UL (ref 0–0.2)
PLATELET # BLD AUTO: 167 K/UL (ref 150–450)
PMV BLD AUTO: 10.5 FL (ref 9.4–12.3)
POTASSIUM SERPL-SCNC: 3.5 MMOL/L (ref 3.5–5.1)
RBC # BLD AUTO: 3.67 M/UL (ref 4.05–5.2)
SODIUM SERPL-SCNC: 138 MMOL/L (ref 138–145)
SPECIMEN EXP DATE BLD: NORMAL
STATUS OF UNIT,%ST: NORMAL
STATUS OF UNIT,%ST: NORMAL
UNIT DIVISION, %UDIV: 0
UNIT DIVISION, %UDIV: 0
WBC # BLD AUTO: 6.1 K/UL (ref 4.3–11.1)

## 2020-12-02 PROCEDURE — 65660000000 HC RM CCU STEPDOWN

## 2020-12-02 PROCEDURE — 74011250637 HC RX REV CODE- 250/637: Performed by: NURSE PRACTITIONER

## 2020-12-02 PROCEDURE — 85027 COMPLETE CBC AUTOMATED: CPT

## 2020-12-02 PROCEDURE — 80048 BASIC METABOLIC PNL TOTAL CA: CPT

## 2020-12-02 PROCEDURE — 74011250637 HC RX REV CODE- 250/637: Performed by: INTERNAL MEDICINE

## 2020-12-02 PROCEDURE — 83735 ASSAY OF MAGNESIUM: CPT

## 2020-12-02 PROCEDURE — 2709999900 HC NON-CHARGEABLE SUPPLY

## 2020-12-02 PROCEDURE — 99232 SBSQ HOSP IP/OBS MODERATE 35: CPT | Performed by: INTERNAL MEDICINE

## 2020-12-02 PROCEDURE — 36415 COLL VENOUS BLD VENIPUNCTURE: CPT

## 2020-12-02 PROCEDURE — 77030027138 HC INCENT SPIROMETER -A

## 2020-12-02 RX ORDER — AMIODARONE HYDROCHLORIDE 200 MG/1
600 TABLET ORAL EVERY 12 HOURS
Status: COMPLETED | OUTPATIENT
Start: 2020-12-03 | End: 2020-12-04

## 2020-12-02 RX ORDER — AMIODARONE HYDROCHLORIDE 200 MG/1
600 TABLET ORAL EVERY 12 HOURS
Status: DISCONTINUED | OUTPATIENT
Start: 2020-12-02 | End: 2020-12-02 | Stop reason: CLARIF

## 2020-12-02 RX ADMIN — NITROGLYCERIN 1 INCH: 20 OINTMENT TOPICAL at 00:10

## 2020-12-02 RX ADMIN — PANTOPRAZOLE SODIUM 40 MG: 40 TABLET, DELAYED RELEASE ORAL at 08:14

## 2020-12-02 RX ADMIN — Medication 10 ML: at 00:16

## 2020-12-02 RX ADMIN — DOCUSATE SODIUM 100 MG: 100 CAPSULE ORAL at 08:14

## 2020-12-02 RX ADMIN — NITROGLYCERIN 1 INCH: 20 OINTMENT TOPICAL at 17:26

## 2020-12-02 RX ADMIN — Medication 10 ML: at 21:02

## 2020-12-02 RX ADMIN — NITROGLYCERIN 1 INCH: 20 OINTMENT TOPICAL at 05:25

## 2020-12-02 RX ADMIN — NITROGLYCERIN 1 INCH: 20 OINTMENT TOPICAL at 23:00

## 2020-12-02 RX ADMIN — Medication 10 ML: at 18:00

## 2020-12-02 RX ADMIN — LISINOPRIL 20 MG: 20 TABLET ORAL at 08:14

## 2020-12-02 RX ADMIN — ROSUVASTATIN CALCIUM 20 MG: 20 TABLET, COATED ORAL at 21:02

## 2020-12-02 RX ADMIN — Medication 10 ML: at 05:38

## 2020-12-02 RX ADMIN — ASPIRIN 81 MG: 81 TABLET ORAL at 08:14

## 2020-12-02 RX ADMIN — NITROGLYCERIN 1 INCH: 20 OINTMENT TOPICAL at 12:19

## 2020-12-02 NOTE — PROGRESS NOTES
[facility] Progress Note         Admit Date:  11/29/2020    Today's Date:  12/2/2020    CC:  Melena and anemia    Subjective:     Patient: Had a black stool this am. Denies any abdominal pain, N&V. Asking for diet advancement.     Push enteroscopy Procedure Note     Indications: GI blood loss anemia.     Anesthesia/Sedation: MAC IV      Pre-Procedure Physical:            Current Facility-Administered Medications   Medication Dose Route Frequency    0.9% sodium chloride infusion 250 mL  250 mL IntraVENous PRN    rosuvastatin (CRESTOR) tablet 20 mg  20 mg Oral QHS    lactated Ringers infusion 1,000 mL  1,000 mL IntraVENous CONTINUOUS    heparin (PF) 2 units/ml in NS infusion  3 mL/hr IntraVENous CONTINUOUS    midazolam (VERSED) injection 0.5-2 mg  0.5-2 mg IntraVENous Multiple    fentaNYL citrate (PF) injection 25-50 mcg  25-50 mcg IntraVENous Multiple    lip protectant (BLISTEX) ointment 1 Each  1 Each Topical PRN    acetaminophen (TYLENOL) tablet 325 mg  325 mg Oral Q6H PRN    aspirin delayed-release tablet 81 mg  81 mg Oral 7am    pantoprazole (PROTONIX) tablet 40 mg  40 mg Oral ACB    sodium chloride (NS) flush 5-40 mL  5-40 mL IntraVENous Q8H    sodium chloride (NS) flush 5-40 mL  5-40 mL IntraVENous PRN    nitroglycerin (NITROBID) 2 % ointment 1 Inch  1 Inch Topical Q6H    nitroglycerin (NITROSTAT) tablet 0.4 mg  0.4 mg SubLINGual Q5MIN PRN    morphine injection 2 mg  2 mg IntraVENous Q4H PRN    naloxone (NARCAN) injection 0.4 mg  0.4 mg IntraVENous PRN    ondansetron (ZOFRAN) injection 4 mg  4 mg IntraVENous Q4H PRN    alum-mag hydroxide-simeth (MYLANTA) oral suspension 30 mL  30 mL Oral Q4H PRN    diphenhydrAMINE (BENADRYL) capsule 25 mg  25 mg Oral Q6H PRN    docusate sodium (COLACE) capsule 100 mg  100 mg Oral BID PRN    lisinopriL (PRINIVIL, ZESTRIL) tablet 20 mg  20 mg Oral DAILY      Toprol xl [metoprolol succinate]     Patient Vitals for the past 8 hrs:    BP Temp Pulse Resp SpO2 Height Weight   12/01/20 1350 (!) 138/98 96.8 °F (36 °C) 80 16 97 %     12/01/20 1317 (!) 185/88  82 16 98 %     12/01/20 1230 (!) 195/81 98.1 °F (36.7 °C) 81 13 98 %     12/01/20 1211      5' 4\" (1.626 m) 72.1 kg (159 lb)   12/01/20 1206 (!) 174/79 98.2 °F (36.8 °C) 77 18 99 %     12/01/20 1130 (!) 146/68 98.2 °F (36.8 °C) 82 16 96 %     12/01/20 1115 (!) 152/81 98 °F (36.7 °C) 84 16 97 %     12/01/20 0845 130/60 98 °F (36.7 °C) 87 14 97 %     12/01/20 0833 130/60  87       12/01/20 0609 (!) 118/53  85             Exam      Airway: clear   Heart: normal S1and S2    Lungs: clear bilateral  Abdomen: soft, nontender, bowel sounds present and normal in all quads   Mental Status: awake, alert and oriented to person, place and time          Procedure Details      Informed consent was obtained for the procedure, including conscious sedation. Risks of pancreatitis, infection, perforation, hemorrhage, adverse drug reaction and aspiration were discussed. The patient was placed in the left lateral decubitus position. Based on the pre-procedure assessment, including review of the patient's medical history, medications, allergies, and review of systems, she had been deemed to be an appropriate candidate for conscious sedation; she was therefore sedated with the medications listed below. She was monitored continuously with ECG tracing, pulse oximetry, blood pressure monitoring, and direct observation. The pediatric colonoscope was inserted into the mouth and advanced under direct vision to the proximal jejunum.       Findings:   Esophagus- Normal.  Stomach- Normal.  Duodenum- Normal.  Jejunum-In the very proximal jejunum at about 110 cm from the incisors was a small AVM. On the way back there was mild bleeding at the AVM. Two Endo Clips were deployed over the AVM and bleeding stopped.   No other abnormalities or bleeding were was seen as far as the scope could reach.     Therapies: Endo Clips     Specimens: None     Estimated Blood Loss: 1 cc           Complications:   None; patient tolerated the procedure well. Attending Attestation:  I performed the procedure.      Impression:  SB AVM with some bleeding endo clipped        Recommendations:  Start clears. Monitor sx and hgb etc. Otherwise as per Cardiology and CV surgery.     SELMA Biggs MD               Medications:   Current Facility-Administered Medications   Medication Dose Route Frequency    0.9% sodium chloride infusion 250 mL  250 mL IntraVENous PRN    rosuvastatin (CRESTOR) tablet 20 mg  20 mg Oral QHS    heparin (PF) 2 units/ml in NS infusion  3 mL/hr IntraVENous CONTINUOUS    midazolam (VERSED) injection 0.5-2 mg  0.5-2 mg IntraVENous Multiple    fentaNYL citrate (PF) injection 25-50 mcg  25-50 mcg IntraVENous Multiple    lip protectant (BLISTEX) ointment 1 Each  1 Each Topical PRN    acetaminophen (TYLENOL) tablet 325 mg  325 mg Oral Q6H PRN    aspirin delayed-release tablet 81 mg  81 mg Oral 7am    pantoprazole (PROTONIX) tablet 40 mg  40 mg Oral ACB    sodium chloride (NS) flush 5-40 mL  5-40 mL IntraVENous Q8H    sodium chloride (NS) flush 5-40 mL  5-40 mL IntraVENous PRN    nitroglycerin (NITROBID) 2 % ointment 1 Inch  1 Inch Topical Q6H    nitroglycerin (NITROSTAT) tablet 0.4 mg  0.4 mg SubLINGual Q5MIN PRN    morphine injection 2 mg  2 mg IntraVENous Q4H PRN    naloxone (NARCAN) injection 0.4 mg  0.4 mg IntraVENous PRN    ondansetron (ZOFRAN) injection 4 mg  4 mg IntraVENous Q4H PRN    alum-mag hydroxide-simeth (MYLANTA) oral suspension 30 mL  30 mL Oral Q4H PRN    diphenhydrAMINE (BENADRYL) capsule 25 mg  25 mg Oral Q6H PRN    docusate sodium (COLACE) capsule 100 mg  100 mg Oral BID PRN    lisinopriL (PRINIVIL, ZESTRIL) tablet 20 mg  20 mg Oral DAILY       Review of Systems:  ROS was obtained, with pertinent positives as listed above. No chest pain or SOB.     Diet:  Clear liquid diet    Objective:   Vitals:  Visit Vitals  BP (!) 152/74   Pulse 87   Temp 98.1 °F (36.7 °C)   Resp 18   Ht 5' 4\" (1.626 m)   Wt 73.9 kg (162 lb 14.4 oz)   SpO2 95%   BMI 27.96 kg/m²     Intake/Output:  No intake/output data recorded. 11/30 1901 - 12/02 0700  In: 1742 [P.O.:380; I.V.:700]  Out: 1200 [Urine:1200]  Exam:  General appearance: alert, cooperative, no distress DAUGHTER AT BEDSIDE  Lungs: clear to auscultation bilaterally anteriorly  Heart: regular rate and rhythm WITH KNOWN MURMUR  Abdomen: soft, non-tender. Bowel sounds normal. No masses, no organomegaly  Extremities: extremities normal, atraumatic, no cyanosis or edema  Neuro:  alert and oriented X4    Data Review (Labs):    Recent Labs     12/02/20  0404 12/01/20  0355 11/30/20  0940 11/30/20  0612 11/29/20  1654   WBC 6.1 4.9 5.3  --  7.6   HGB 10.5* 7.2* 7.8*  --  8.7*   HCT 31.5* 23.3* 25.3*  --  28.3*    166 183  --  221   MCV 85.8 92.1 93.4  --  90.1    137*  --  137* 138   K 3.5 3.4*  --  3.6 3.5    108*  --  107 105   CO2 25 22  --  24 24   BUN 15 17  --  22 24*   CREA 1.04* 1.09*  --  1.04* 1.20*   CA 9.3 9.1  --  9.3 9.6   MG 1.8 2.0  --  2.1 1.5*   * 134*  --  136* 145*   AP  --   --   --   --  147*   ALT  --   --   --   --  28   TBILI  --   --   --   --  0.4   ALB  --   --   --   --  3.9   TP  --   --   --   --  8.0       Assessment:     Principal Problem:    NSTEMI (non-ST elevated myocardial infarction) ([address] (11/29/2020)    Active Problems:    Hypertension ()      CKD (chronic kidney disease), stage III (12/10/2013)      Dyslipidemia (12/10/2013)      Iron deficiency anemia (3/21/2017)      Overview:  :  Has requires frequent transfusions. No obvious source identified. Coronary atherosclerosis of native coronary vessel (3/21/2017)      Overview:       1. Cardiac  Calcium score (12/28/08) : Total: 218. LAD: 129, Lcx: 29,       RCA: 60      2. Lexiscan Cardiolite (3/9/10):  Apical thinning. No ischemia. Normal       LV function EF 68%. Nonrheumatic aortic valve stenosis (10/15/2020)      Overview: Echo (11/6/20):  EF 60-65%. +DD. Mild LAE. Aortic Stenosis:  MG 13.  PG       22.  DI 0.45. Mild MR/TR. Hypomagnesemia (11/29/2020)      Syncope and collapse (11/29/2020)    67year old known female patient with CAD and NSTEMI with plans for intervention by Dr. Chance Ashby, chronic TAYLOR with history of small bowel AVMs. and smoldering multiple myeloma who we are following for anemia and bleeding. Hgb 7.2 on 12/1 despite PRBCs. Her current Hgb has increased to 10.5 after transfusion. She had an EGD with push enteroscopy by Dr. Itzel Segura on 12/1 which revealed a bleeding AVM in the proximal jejunum. Clips were applied. Plans for CABG this week by Cardiology. Plan:     -Diet advancement to GI soft if Cardiology Surgeon agreeable depending on CABG timing  -Serial H&H and transfuse as needed  -We will sign off. Please call for any questions. Saray Barcenas. Gisela Gonzalez in collaboration with Dr. Abrahan Oshea  [facility]    =Had one melanotic stool overnight. Hgb post tx though is 10.5. Seems stable from a GI standpoint. CABG planned. Call if needed.     Abrahan Oshea MD

## 2020-12-02 NOTE — PROGRESS NOTES
Problem: Patient Education: Go to Patient Education Activity  Goal: Patient/Family Education  Description: Pt and her daughter watched CABG video    Outcome: Progressing Towards Goal  Note: Pt has watched Video and has received CABG booklet. She has also been trained with IS     Problem: CABG: Pre-Op Day  Goal: Consults, if ordered  Outcome: Progressing Towards Goal  Goal: Diagnostic Test/Procedures  Outcome: Progressing Towards Goal  Goal: Nutrition/Diet  Outcome: Progressing Towards Goal  Goal: Medications  Outcome: Progressing Towards Goal  Goal: Treatments/Interventions/Procedures  Outcome: Progressing Towards Goal  Goal: Discharge Planning  Outcome: Progressing Towards Goal  Goal: Psychosocial  Outcome: Progressing Towards Goal  Goal: *Hemodynamically stable  Outcome: Progressing Towards Goal  Note: Pt has been given blood and HGB is currently stable.

## 2020-12-02 NOTE — ROUTINE PROCESS
Verbal bedside report given to Rakesh Godoy oncchente RN. Patient's situation, background, assessment and recommendations provided. Opportunity for questions provided. Oncoming RN assumed care of patient.

## 2020-12-02 NOTE — ROUTINE PROCESS
Verbal bedside report given to Stefano Gomez oncoming RN. Patient's situation, background, assessment and recommendations provided. Opportunity for questions provided. Oncoming RN assumed care of patient.

## 2020-12-02 NOTE — PROGRESS NOTES
[facility][1] staff  available over the phone from 3:30 p.m. - midnight. Please call Maynor at [phone] with any interpreting requests.       [address]  Language Services Department  [address]  [address]  [phone] (phone)

## 2020-12-02 NOTE — PROGRESS NOTES
[facility] staff  available over the phone from 3:30 p.m. - midnight. Please call Maynor at [phone] with any interpreting requests.       [facility] [facility]  Language Services Department  [address]  [address]  [phone] (phone)

## 2020-12-02 NOTE — PROGRESS NOTES
[facility] CARDIOLOGY PROGRESS NOTE           12/2/2020 7:35 AM    Admit Date: 11/29/2020      Subjective:   Patient feels weak. No chest pain or syncope. Received 2 units PRBCs and Hgb above 10. Had EGD with AVMs    ROS:  Cardiovascular:  As noted above    Objective:      Vitals:    12/01/20 2110 12/02/20 0010 12/02/20 0134 12/02/20 0525   BP: (!) 147/83 130/60 108/76 130/60   Pulse: 85 89 84 77   Resp: 18  18 18   Temp: 98.7 °F (37.1 °C)  98.1 °F (36.7 °C) 99 °F (37.2 °C)   SpO2: 95%  94% 94%   Weight:    162 lb 14.4 oz (73.9 kg)   Height:           Physical Exam:  General-No Acute Distress  Neck- supple, no JVD  CV- regular rate and rhythm no MRG  Lung- clear bilaterally  Abd- soft, nontender, nondistended  Ext- no edema bilaterally. Skin- warm and dry      Data Review:   Recent Labs     12/02/20  0404 12/01/20  0355  11/30/20  0612    137*  --  137*   K 3.5 3.4*  --  3.6   MG 1.8 2.0  --  2.1   BUN 15 17  --  22   CREA 1.04* 1.09*  --  1.04*   * 134*  --  136*   WBC 6.1 4.9   < >  --    HGB 10.5* 7.2*   < >  --    HCT 31.5* 23.3*   < >  --     166   < >  --    TRIGL  --   --   --  159*   HDL  --   --   --  30*    < > = values in this interval not displayed. No results found for: CARLY Patel    Assessment/Plan:     Principal Problem:    NSTEMI (non-ST elevated myocardial infarction) ([facility] [address] (11/29/2020)    Multivessel disease. Not able to tolerate DAPT therapy. Will need CABG. CT surgery consulted. Active Problems:    Hypertension ()    BP acceptable. CKD (chronic kidney disease), stage III (12/10/2013)    Renal function stable. Dyslipidemia (12/10/2013)    Continue Crestor. Increase dose to 20 mg. Iron deficiency anemia (3/21/2017)    Improved after  2 units today. Coronary atherosclerosis of native coronary vessel (3/21/2017)    See above. Nonrheumatic aortic valve stenosis (10/15/2020)    Mild per echo. Syncope and collapse (11/29/2020)    ? Etiology. No arrhythmias on monitor. Continue to monitor on telemetry.                    Karen Robb MD  12/2/2020 7:35 AM

## 2020-12-02 NOTE — ROUTINE PROCESS
Verbal bedside report received from Naman, [facility]. Assumed care of patient.

## 2020-12-02 NOTE — PROGRESS NOTES
CTS-long discussion with patient and daughter regarding CABG surgery. She is willing to proceed and is scheduled for Friday (1st case). She denies any current chest discomfort or dyspnea. All questions answered. Will continue to monitor Hgb/Hct, will advance diet as tolerated.  She had dark stool this AM.     Teodoro Barrientos PA-C

## 2020-12-03 ENCOUNTER — APPOINTMENT (OUTPATIENT)
Dept: ULTRASOUND IMAGING | Age: 75
DRG: 233 | End: 2020-12-03
Attending: THORACIC SURGERY (CARDIOTHORACIC VASCULAR SURGERY)
Payer: MEDICARE

## 2020-12-03 ENCOUNTER — ANESTHESIA EVENT (OUTPATIENT)
Dept: SURGERY | Age: 75
DRG: 233 | End: 2020-12-03
Payer: MEDICARE

## 2020-12-03 LAB
ALBUMIN SERPL-MCNC: 3.3 G/DL (ref 3.2–4.6)
ALBUMIN/GLOB SERPL: 1 {RATIO} (ref 1.2–3.5)
ALP SERPL-CCNC: 143 U/L (ref 50–136)
ALT SERPL-CCNC: 20 U/L (ref 12–65)
ANION GAP SERPL CALC-SCNC: 8 MMOL/L (ref 7–16)
APTT PPP: 32.8 SEC (ref 24.1–35.1)
AST SERPL-CCNC: 14 U/L (ref 15–37)
BACTERIA SPEC CULT: ABNORMAL
BILIRUB SERPL-MCNC: 0.7 MG/DL (ref 0.2–1.1)
BUN SERPL-MCNC: 18 MG/DL (ref 8–23)
CALCIUM SERPL-MCNC: 9.3 MG/DL (ref 8.3–10.4)
CHLORIDE SERPL-SCNC: 106 MMOL/L (ref 98–107)
CO2 SERPL-SCNC: 24 MMOL/L (ref 21–32)
CREAT SERPL-MCNC: 0.94 MG/DL (ref 0.6–1)
ERYTHROCYTE [DISTWIDTH] IN BLOOD BY AUTOMATED COUNT: 16.6 % (ref 11.9–14.6)
EST. AVERAGE GLUCOSE BLD GHB EST-MCNC: 111 MG/DL
GLOBULIN SER CALC-MCNC: 3.4 G/DL (ref 2.3–3.5)
GLUCOSE SERPL-MCNC: 131 MG/DL (ref 65–100)
HBA1C MFR BLD: 5.5 % (ref 4.8–6)
HCT VFR BLD AUTO: 31.6 % (ref 35.8–46.3)
HGB BLD-MCNC: 10.4 G/DL (ref 11.7–15.4)
HISTORY CHECKED?,CKHIST: NORMAL
INR PPP: 1.2
MAGNESIUM SERPL-MCNC: 1.5 MG/DL (ref 1.8–2.4)
MCH RBC QN AUTO: 28.4 PG (ref 26.1–32.9)
MCHC RBC AUTO-ENTMCNC: 32.9 G/DL (ref 31.4–35)
MCV RBC AUTO: 86.3 FL (ref 79.6–97.8)
NRBC # BLD: 0 K/UL (ref 0–0.2)
PLATELET # BLD AUTO: 182 K/UL (ref 150–450)
PMV BLD AUTO: 10.4 FL (ref 9.4–12.3)
POTASSIUM SERPL-SCNC: 3.5 MMOL/L (ref 3.5–5.1)
PROT SERPL-MCNC: 6.7 G/DL (ref 6.3–8.2)
PROTHROMBIN TIME: 15.6 SEC (ref 12.5–14.7)
RBC # BLD AUTO: 3.66 M/UL (ref 4.05–5.2)
SERVICE CMNT-IMP: ABNORMAL
SODIUM SERPL-SCNC: 138 MMOL/L (ref 136–145)
WBC # BLD AUTO: 5.8 K/UL (ref 4.3–11.1)

## 2020-12-03 PROCEDURE — 85730 THROMBOPLASTIN TIME PARTIAL: CPT

## 2020-12-03 PROCEDURE — 80053 COMPREHEN METABOLIC PANEL: CPT

## 2020-12-03 PROCEDURE — 93880 EXTRACRANIAL BILAT STUDY: CPT

## 2020-12-03 PROCEDURE — 85610 PROTHROMBIN TIME: CPT

## 2020-12-03 PROCEDURE — 74011250637 HC RX REV CODE- 250/637: Performed by: NURSE PRACTITIONER

## 2020-12-03 PROCEDURE — 83735 ASSAY OF MAGNESIUM: CPT

## 2020-12-03 PROCEDURE — 85027 COMPLETE CBC AUTOMATED: CPT

## 2020-12-03 PROCEDURE — 65660000000 HC RM CCU STEPDOWN

## 2020-12-03 PROCEDURE — 74011250637 HC RX REV CODE- 250/637: Performed by: THORACIC SURGERY (CARDIOTHORACIC VASCULAR SURGERY)

## 2020-12-03 PROCEDURE — 74011250637 HC RX REV CODE- 250/637: Performed by: INTERNAL MEDICINE

## 2020-12-03 PROCEDURE — 86900 BLOOD TYPING SEROLOGIC ABO: CPT

## 2020-12-03 PROCEDURE — 86923 COMPATIBILITY TEST ELECTRIC: CPT

## 2020-12-03 PROCEDURE — 87641 MR-STAPH DNA AMP PROBE: CPT

## 2020-12-03 PROCEDURE — 99232 SBSQ HOSP IP/OBS MODERATE 35: CPT | Performed by: INTERNAL MEDICINE

## 2020-12-03 PROCEDURE — 36415 COLL VENOUS BLD VENIPUNCTURE: CPT

## 2020-12-03 PROCEDURE — 83036 HEMOGLOBIN GLYCOSYLATED A1C: CPT

## 2020-12-03 RX ORDER — CEFAZOLIN SODIUM/WATER 2 G/20 ML
2 SYRINGE (ML) INTRAVENOUS
Status: COMPLETED | OUTPATIENT
Start: 2020-12-04 | End: 2020-12-04

## 2020-12-03 RX ORDER — AMLODIPINE BESYLATE 10 MG/1
10 TABLET ORAL
Status: COMPLETED | OUTPATIENT
Start: 2020-12-03 | End: 2020-12-03

## 2020-12-03 RX ADMIN — PANTOPRAZOLE SODIUM 40 MG: 40 TABLET, DELAYED RELEASE ORAL at 06:30

## 2020-12-03 RX ADMIN — ROSUVASTATIN CALCIUM 20 MG: 20 TABLET, COATED ORAL at 21:09

## 2020-12-03 RX ADMIN — AMIODARONE HYDROCHLORIDE 600 MG: 200 TABLET ORAL at 17:18

## 2020-12-03 RX ADMIN — NITROGLYCERIN 1 INCH: 20 OINTMENT TOPICAL at 12:00

## 2020-12-03 RX ADMIN — Medication 10 ML: at 15:25

## 2020-12-03 RX ADMIN — Medication 10 ML: at 06:33

## 2020-12-03 RX ADMIN — Medication 5 ML: at 21:09

## 2020-12-03 RX ADMIN — NITROGLYCERIN 1 INCH: 20 OINTMENT TOPICAL at 06:29

## 2020-12-03 RX ADMIN — AMLODIPINE BESYLATE 10 MG: 10 TABLET ORAL at 21:10

## 2020-12-03 RX ADMIN — ASPIRIN 81 MG: 81 TABLET ORAL at 06:29

## 2020-12-03 RX ADMIN — NITROGLYCERIN 1 INCH: 20 OINTMENT TOPICAL at 17:18

## 2020-12-03 RX ADMIN — LISINOPRIL 20 MG: 20 TABLET ORAL at 08:06

## 2020-12-03 NOTE — ROUTINE PROCESS
Bedside and Verbal shift change report given to self (oncoming nurse) by Agnieszka Gayle RN (offgoing nurse). Report included the following information SBAR, Kardex, ED Summary, Procedure Summary, Intake/Output, MAR, Recent Results and Cardiac Rhythm NSR.

## 2020-12-03 NOTE — ROUTINE PROCESS
Bedside and verbal report given to [facility] by Bk Arrieta. Report included the following information SBAR, Kardex, Intake/Output and MAR. Oncoming RN assumed care of the patient.

## 2020-12-03 NOTE — ROUTINE PROCESS
Verbal bedside report received from Wesley Aguirre, [facility]. Assumed care of patient.

## 2020-12-03 NOTE — PROGRESS NOTES
Pt reportedly was stable overnight without CHF, or palpitations, but had mild CP briefly. Pt is scheduled for a CABG on Friday. CM will continue to follow. Care Management Interventions  PCP Verified by CM: Yes(Dr. Stephen Jiménez MD.)  Mode of Transport at Discharge:  Other (see comment)(Family)  Transition of Care Consult (CM Consult): Discharge Planning  Current Support Network: Own Home, Family Lives [place], Lives with Spouse  Confirm Follow Up Transport: Family  The Plan for Transition of Care is Related to the Following Treatment Goals : Patient return to her baseline  Discharge Location  Discharge Placement: Home

## 2020-12-03 NOTE — ANESTHESIA PREPROCEDURE EVALUATION
Relevant Problems   No relevant active problems       Anesthetic History     PONV (SEVERE per patient related more to opioids than anesthesia.)          Review of Systems / Medical History  Patient summary reviewed and pertinent labs reviewed    Pulmonary  Within defined limits                 Neuro/Psych              Cardiovascular    Hypertension: well controlled  Valvular problems/murmurs: aortic stenosis        CAD, PAD (Carotids 50-69% bilaterally.) and hyperlipidemia    Exercise tolerance: <4 METS  Comments: Left ventricle: Systolic function was mildly to moderately reduced. Ejection  fraction was estimated in the range of 40 % to 45 %. There was hypokinesis of  the basal-mid inferolateral and apical wall(s).    -  Inferior vena cava, hepatic veins: The respirophasic change in diameter   was  less than 50%.    -  Aortic valve: The findings were most consistent with mild aortic stenosis. Elevated troponin and multivessel CAD; concerning for NSTEMI; pt states she is candidate for CABG this week vs stent   GI/Hepatic/Renal     GERD: well controlled           Endo/Other        Arthritis and anemia     Other Findings   Comments: Pt has had multiple transfusions for intenstinal AVMs last clipeed wednesday    Hx smoldering myeloma         Physical Exam    Airway  Mallampati: I  TM Distance: 4 - 6 cm  Neck ROM: normal range of motion   Mouth opening: Normal     Cardiovascular    Rhythm: regular  Rate: normal    Murmur     Dental    Dentition: Full upper dentures and Full lower dentures     Pulmonary  Breath sounds clear to auscultation               Abdominal  GI exam deferred       Other Findings            Anesthetic Plan    ASA: 4  Anesthesia type: general    Monitoring Plan: Arterial line and CVP      Induction: Intravenous  Anesthetic plan and risks discussed with: Patient, Family and Son / Daughter      Caution (and potentially avoidance of gastric views given recent AVM clippings.    Will forego KARAN unless absolutely required given GI bleeding hx  Emend for PONV

## 2020-12-03 NOTE — PROGRESS NOTES
CTS-pt denies any chest pain or dyspnea. CABG surgery planned for tomorrow AM. All questions answered.      Cesar Linton PA-C

## 2020-12-03 NOTE — PROGRESS NOTES
[facility] CARDIOLOGY PROGRESS NOTE    12/3/2020 7:39 AM    Admit Date: 11/29/2020        Subjective:   Stable overnight without CHF, or palpitations, but had mild CP briefly overnight. Vitals fairly stable and controlled. No other complaints overnight. Tolerating meds well. CABG tomorrow. Objective:      Vitals:    12/02/20 1715 12/02/20 2110 12/03/20 0059 12/03/20 0532   BP: (!) 162/72 (!) 157/67 138/65 (!) 127/58   Pulse: 83 83 77 71   Resp: 18 18 18 18   Temp: 98.1 °F (36.7 °C) 98 °F (36.7 °C) 98.8 °F (37.1 °C) 98.5 °F (36.9 °C)   SpO2: 97% 94% 95% 93%   Weight:    161 lb 8 oz (73.3 kg)   Height:           Physical Exam:  Neck- supple, no JVD  CV- regular rate and rhythm no MRG  Lung- clear bilaterally  Abd- soft, nontender, nondistended  Ext- no edema  Skin- warm and dry    Data Review:   Recent Labs     12/03/20  0330 12/02/20  0404    138   K 3.5 3.5   MG 1.5* 1.8   BUN 18 15   CREA 0.94 1.04*   * 128*   WBC 5.8 6.1   HGB 10.4* 10.5*   HCT 31.6* 31.5*    167   INR 1.2  --        Assessment and Plan:     Principal Problem:    NSTEMI (non-ST elevated myocardial infarction) ([facility] [address] (11/29/2020)- mild CP overnight, CABG planned for tomorrow AM    Active Problems:    Hypertension ()- stable, increase meds as needed. No lopressor with allergy. Add norvasc or increase ACE-I as needed. CKD (chronic kidney disease), stage III - stable, recheck in AM      Dyslipidemia - stable, continue statin      Iron deficiency anemia (3/21/2017)      Overview:  :  Has requires frequent transfusions. No obvious source identified. Coronary atherosclerosis of native coronary vessel - CABG tomorrow. Overview:       1. Cardiac  Calcium score (12/28/08) : Total: 218. LAD: 129, Lcx: 29,       RCA: 60      2. Lexiscan Cardiolite (3/9/10):  Apical thinning. No ischemia. Normal       LV function EF 68%.             Nonrheumatic aortic valve stenosis (10/15/2020)      Overview: Echo (11/6/20):  EF 60-65%. +DD. Mild LAE. Aortic Stenosis:  MG 13.  PG       22.  DI 0.45. Mild MR/TR. Hypomagnesemia (11/29/2020)- recheck in AM      Syncope and collapse (11/29/2020)        RELL Nunn MD  [facility] Cardiology  Pager [phone]

## 2020-12-03 NOTE — ROUTINE PROCESS
Bedside and Verbal shift change report given to be given to [facility], [facility], RN (oncoming nurse) by self (offgoing nurse). Report included the following information SBAR, Kardex, and Recent Results.

## 2020-12-04 ENCOUNTER — ANESTHESIA (OUTPATIENT)
Dept: SURGERY | Age: 75
DRG: 233 | End: 2020-12-04
Payer: MEDICARE

## 2020-12-04 ENCOUNTER — APPOINTMENT (OUTPATIENT)
Dept: GENERAL RADIOLOGY | Age: 75
DRG: 233 | End: 2020-12-04
Attending: THORACIC SURGERY (CARDIOTHORACIC VASCULAR SURGERY)
Payer: MEDICARE

## 2020-12-04 PROBLEM — Z95.1 S/P CABG X 3: Status: ACTIVE | Noted: 2020-12-04

## 2020-12-04 PROBLEM — R09.02 HYPOXIA: Status: ACTIVE | Noted: 2020-12-04

## 2020-12-04 PROBLEM — Z99.11 ENCOUNTER FOR WEANING FROM VENTILATOR (HCC): Status: ACTIVE | Noted: 2020-12-04

## 2020-12-04 LAB
ANION GAP SERPL CALC-SCNC: 6 MMOL/L (ref 7–16)
ANION GAP SERPL CALC-SCNC: 7 MMOL/L (ref 7–16)
APTT PPP: 33.7 SEC (ref 24.1–35.1)
ARTERIAL PATENCY WRIST A: NO
ARTERIAL PATENCY WRIST A: YES
ATRIAL RATE: 82 BPM
BASE DEFICIT BLD-SCNC: 2 MMOL/L
BASE DEFICIT BLD-SCNC: 2 MMOL/L
BASE DEFICIT BLD-SCNC: 4 MMOL/L
BASE DEFICIT BLD-SCNC: 5 MMOL/L
BASE DEFICIT BLD-SCNC: 5 MMOL/L
BASE DEFICIT BLD-SCNC: 6 MMOL/L
BASE DEFICIT BLD-SCNC: 6 MMOL/L
BDY SITE: ABNORMAL
BDY SITE: ABNORMAL
BUN SERPL-MCNC: 16 MG/DL (ref 8–23)
BUN SERPL-MCNC: 18 MG/DL (ref 8–23)
CA-I BLD-MCNC: 1.2 MMOL/L (ref 1.12–1.32)
CA-I BLD-MCNC: 1.26 MMOL/L (ref 1.12–1.32)
CA-I BLD-MCNC: 1.27 MMOL/L (ref 1.12–1.32)
CA-I BLD-MCNC: 1.32 MMOL/L (ref 1.12–1.32)
CA-I BLD-MCNC: 1.42 MMOL/L (ref 1.12–1.32)
CA-I BLD-MCNC: 1.44 MMOL/L (ref 1.12–1.32)
CALCIUM SERPL-MCNC: 8.7 MG/DL (ref 8.3–10.4)
CALCIUM SERPL-MCNC: 9 MG/DL (ref 8.3–10.4)
CALCULATED P AXIS, ECG09: 78 DEGREES
CALCULATED R AXIS, ECG10: 109 DEGREES
CALCULATED T AXIS, ECG11: -48 DEGREES
CHLORIDE SERPL-SCNC: 106 MMOL/L (ref 98–107)
CHLORIDE SERPL-SCNC: 115 MMOL/L (ref 98–107)
CO2 BLD-SCNC: 19 MMOL/L
CO2 SERPL-SCNC: 22 MMOL/L (ref 21–32)
CO2 SERPL-SCNC: 24 MMOL/L (ref 21–32)
COLLECT TIME,HTIME: 1255
COLLECT TIME,HTIME: 2000
CREAT SERPL-MCNC: 0.92 MG/DL (ref 0.6–1)
CREAT SERPL-MCNC: 1.21 MG/DL (ref 0.6–1)
DIAGNOSIS, 93000: NORMAL
ERYTHROCYTE [DISTWIDTH] IN BLOOD BY AUTOMATED COUNT: 15.7 % (ref 11.9–14.6)
ERYTHROCYTE [DISTWIDTH] IN BLOOD BY AUTOMATED COUNT: 16.3 % (ref 11.9–14.6)
EXHALED MINUTE VOLUME, VE: 8.1 L/MIN
FIBRINOGEN PPP-MCNC: 275 MG/DL (ref 190–501)
GAS FLOW.O2 O2 DELIVERY SYS: ABNORMAL L/MIN
GAS FLOW.O2 O2 DELIVERY SYS: ABNORMAL L/MIN
GAS FLOW.O2 SETTING OXYMISER: 18 BPM
GLUCOSE BLD STRIP.AUTO-MCNC: 103 MG/DL (ref 65–100)
GLUCOSE BLD STRIP.AUTO-MCNC: 110 MG/DL (ref 65–100)
GLUCOSE BLD STRIP.AUTO-MCNC: 114 MG/DL (ref 65–100)
GLUCOSE BLD STRIP.AUTO-MCNC: 121 MG/DL (ref 65–100)
GLUCOSE BLD STRIP.AUTO-MCNC: 129 MG/DL (ref 65–100)
GLUCOSE BLD STRIP.AUTO-MCNC: 130 MG/DL (ref 65–100)
GLUCOSE BLD STRIP.AUTO-MCNC: 134 MG/DL (ref 65–100)
GLUCOSE BLD STRIP.AUTO-MCNC: 139 MG/DL (ref 65–100)
GLUCOSE BLD STRIP.AUTO-MCNC: 140 MG/DL (ref 65–100)
GLUCOSE BLD STRIP.AUTO-MCNC: 150 MG/DL (ref 65–100)
GLUCOSE BLD STRIP.AUTO-MCNC: 154 MG/DL (ref 65–100)
GLUCOSE BLD STRIP.AUTO-MCNC: 168 MG/DL (ref 65–100)
GLUCOSE BLD STRIP.AUTO-MCNC: 170 MG/DL (ref 65–100)
GLUCOSE BLD STRIP.AUTO-MCNC: 175 MG/DL (ref 65–100)
GLUCOSE BLD STRIP.AUTO-MCNC: 182 MG/DL (ref 65–100)
GLUCOSE SERPL-MCNC: 131 MG/DL (ref 65–100)
GLUCOSE SERPL-MCNC: 140 MG/DL (ref 65–100)
HCO3 BLD-SCNC: 17.7 MMOL/L (ref 22–26)
HCO3 BLD-SCNC: 18.5 MMOL/L (ref 22–26)
HCO3 BLD-SCNC: 20.4 MMOL/L (ref 22–26)
HCO3 BLD-SCNC: 20.5 MMOL/L (ref 22–26)
HCO3 BLD-SCNC: 21.6 MMOL/L (ref 22–26)
HCO3 BLD-SCNC: 22.6 MMOL/L (ref 22–26)
HCO3 BLD-SCNC: 23.6 MMOL/L (ref 22–26)
HCT VFR BLD AUTO: 30.6 % (ref 35.8–46.3)
HCT VFR BLD AUTO: 31.9 % (ref 35.8–46.3)
HCT VFR BLD AUTO: 34.9 % (ref 35.8–46.3)
HCT VFR BLD AUTO: 35.2 % (ref 35.8–46.3)
HGB BLD-MCNC: 10.6 G/DL (ref 11.7–15.4)
HGB BLD-MCNC: 11.4 G/DL (ref 11.7–15.4)
HGB BLD-MCNC: 11.5 G/DL (ref 11.7–15.4)
HGB BLD-MCNC: 9.9 G/DL (ref 11.7–15.4)
INR PPP: 1.4
MAGNESIUM SERPL-MCNC: 1.6 MG/DL (ref 1.8–2.4)
MAGNESIUM SERPL-MCNC: 2.2 MG/DL (ref 1.8–2.4)
MAGNESIUM SERPL-MCNC: 2.3 MG/DL (ref 1.8–2.4)
MAGNESIUM SERPL-MCNC: 2.5 MG/DL (ref 1.8–2.4)
MCH RBC QN AUTO: 28.2 PG (ref 26.1–32.9)
MCH RBC QN AUTO: 28.5 PG (ref 26.1–32.9)
MCHC RBC AUTO-ENTMCNC: 32.4 G/DL (ref 31.4–35)
MCHC RBC AUTO-ENTMCNC: 32.7 G/DL (ref 31.4–35)
MCV RBC AUTO: 86.3 FL (ref 79.6–97.8)
MCV RBC AUTO: 88.2 FL (ref 79.6–97.8)
NRBC # BLD: 0 K/UL (ref 0–0.2)
NRBC # BLD: 0 K/UL (ref 0–0.2)
O2/TOTAL GAS SETTING VFR VENT: 30 %
O2/TOTAL GAS SETTING VFR VENT: 60 %
P-R INTERVAL, ECG05: 178 MS
PCO2 BLD: 27.1 MMHG (ref 35–45)
PCO2 BLD: 33.1 MMHG (ref 35–45)
PCO2 BLD: 34.1 MMHG (ref 35–45)
PCO2 BLD: 34.9 MMHG (ref 35–45)
PCO2 BLD: 36.5 MMHG (ref 35–45)
PCO2 BLD: 42.9 MMHG (ref 35–45)
PCO2 BLD: 46.5 MMHG (ref 35–45)
PEEP RESPIRATORY: 8 CMH2O
PEEP RESPIRATORY: 8 CMH2O
PH BLD: 7.27 [PH] (ref 7.35–7.45)
PH BLD: 7.35 [PH] (ref 7.35–7.45)
PH BLD: 7.36 [PH] (ref 7.35–7.45)
PH BLD: 7.36 [PH] (ref 7.35–7.45)
PH BLD: 7.39 [PH] (ref 7.35–7.45)
PH BLD: 7.42 [PH] (ref 7.35–7.45)
PH BLD: 7.42 [PH] (ref 7.35–7.45)
PLATELET # BLD AUTO: 150 K/UL (ref 150–450)
PLATELET # BLD AUTO: 189 K/UL (ref 150–450)
PMV BLD AUTO: 10.1 FL (ref 9.4–12.3)
PMV BLD AUTO: 9.7 FL (ref 9.4–12.3)
PO2 BLD: 110 MMHG (ref 75–100)
PO2 BLD: 136 MMHG (ref 75–100)
PO2 BLD: 173 MMHG (ref 75–100)
PO2 BLD: 276 MMHG (ref 75–100)
PO2 BLD: 314 MMHG (ref 75–100)
PO2 BLD: 399 MMHG (ref 75–100)
PO2 BLD: 97 MMHG (ref 75–100)
POTASSIUM BLD-SCNC: 3.1 MMOL/L (ref 3.5–5.1)
POTASSIUM BLD-SCNC: 3.3 MMOL/L (ref 3.5–5.1)
POTASSIUM BLD-SCNC: 3.4 MMOL/L (ref 3.5–5.1)
POTASSIUM BLD-SCNC: 3.6 MMOL/L (ref 3.5–5.1)
POTASSIUM BLD-SCNC: 4.2 MMOL/L (ref 3.5–5.1)
POTASSIUM BLD-SCNC: 4.9 MMOL/L (ref 3.5–5.1)
POTASSIUM SERPL-SCNC: 3.2 MMOL/L (ref 3.5–5.1)
POTASSIUM SERPL-SCNC: 3.5 MMOL/L (ref 3.5–5.1)
POTASSIUM SERPL-SCNC: 4 MMOL/L (ref 3.5–5.1)
POTASSIUM SERPL-SCNC: 4.1 MMOL/L (ref 3.5–5.1)
PRESSURE SUPPORT SETTING VENT: 10 CMH2O
PRESSURE SUPPORT SETTING VENT: 14 CMH2O
PROTHROMBIN TIME: 17.7 SEC (ref 12.5–14.7)
Q-T INTERVAL, ECG07: 412 MS
QRS DURATION, ECG06: 100 MS
QTC CALCULATION (BEZET), ECG08: 481 MS
RBC # BLD AUTO: 3.47 M/UL (ref 4.05–5.2)
RBC # BLD AUTO: 4.08 M/UL (ref 4.05–5.2)
SAO2 % BLD: 100 % (ref 95–98)
SAO2 % BLD: 98 % (ref 95–98)
SAO2 % BLD: 98 % (ref 95–98)
SAO2 % BLD: 99 % (ref 95–98)
SERVICE CMNT-IMP: ABNORMAL
SODIUM BLD-SCNC: 135 MMOL/L (ref 136–145)
SODIUM BLD-SCNC: 138 MMOL/L (ref 136–145)
SODIUM BLD-SCNC: 138 MMOL/L (ref 136–145)
SODIUM BLD-SCNC: 139 MMOL/L (ref 136–145)
SODIUM BLD-SCNC: 139 MMOL/L (ref 136–145)
SODIUM BLD-SCNC: 140 MMOL/L (ref 136–145)
SODIUM SERPL-SCNC: 137 MMOL/L (ref 138–145)
SODIUM SERPL-SCNC: 143 MMOL/L (ref 136–145)
SPECIMEN TYPE: ABNORMAL
SPECIMEN TYPE: ABNORMAL
TOTAL RESP. RATE, ITRR: 12
VENTILATION MODE VENT: ABNORMAL
VENTILATION MODE VENT: ABNORMAL
VENTRICULAR RATE, ECG03: 82 BPM
VT SETTING VENT: 500 ML
WBC # BLD AUTO: 14.9 K/UL (ref 4.3–11.1)
WBC # BLD AUTO: 6.1 K/UL (ref 4.3–11.1)

## 2020-12-04 PROCEDURE — 83735 ASSAY OF MAGNESIUM: CPT

## 2020-12-04 PROCEDURE — 74011000250 HC RX REV CODE- 250: Performed by: NURSE ANESTHETIST, CERTIFIED REGISTERED

## 2020-12-04 PROCEDURE — 84132 ASSAY OF SERUM POTASSIUM: CPT

## 2020-12-04 PROCEDURE — 77030005401 HC CATH RAD ARRO -A: Performed by: ANESTHESIOLOGY

## 2020-12-04 PROCEDURE — 77030010514 HC APPL CLP LIG COVD -B: Performed by: THORACIC SURGERY (CARDIOTHORACIC VASCULAR SURGERY)

## 2020-12-04 PROCEDURE — 77030010813: Performed by: THORACIC SURGERY (CARDIOTHORACIC VASCULAR SURGERY)

## 2020-12-04 PROCEDURE — 74011000302 HC RX REV CODE- 302: Performed by: THORACIC SURGERY (CARDIOTHORACIC VASCULAR SURGERY)

## 2020-12-04 PROCEDURE — 77030005537 HC CATH URETH BARD -A: Performed by: THORACIC SURGERY (CARDIOTHORACIC VASCULAR SURGERY)

## 2020-12-04 PROCEDURE — 77030020751 HC FLTR TBNG TRNSFUS HAEM -A: Performed by: ANESTHESIOLOGY

## 2020-12-04 PROCEDURE — 77030016688: Performed by: THORACIC SURGERY (CARDIOTHORACIC VASCULAR SURGERY)

## 2020-12-04 PROCEDURE — 74011636637 HC RX REV CODE- 636/637: Performed by: THORACIC SURGERY (CARDIOTHORACIC VASCULAR SURGERY)

## 2020-12-04 PROCEDURE — 77030040393 HC DRSG OPTIFOAM GENT MDII -B

## 2020-12-04 PROCEDURE — 74011250637 HC RX REV CODE- 250/637: Performed by: ANESTHESIOLOGY

## 2020-12-04 PROCEDURE — 74011000258 HC RX REV CODE- 258: Performed by: THORACIC SURGERY (CARDIOTHORACIC VASCULAR SURGERY)

## 2020-12-04 PROCEDURE — 77030006689 HC BLD OPHTH BVR BD -A: Performed by: THORACIC SURGERY (CARDIOTHORACIC VASCULAR SURGERY)

## 2020-12-04 PROCEDURE — 74011250636 HC RX REV CODE- 250/636: Performed by: ANESTHESIOLOGY

## 2020-12-04 PROCEDURE — 77030002520 HC INSRT CLMP LATIS STLTH AMR -B: Performed by: THORACIC SURGERY (CARDIOTHORACIC VASCULAR SURGERY)

## 2020-12-04 PROCEDURE — P9045 ALBUMIN (HUMAN), 5%, 250 ML: HCPCS

## 2020-12-04 PROCEDURE — 77030002996 HC SUT SLK J&J -A: Performed by: THORACIC SURGERY (CARDIOTHORACIC VASCULAR SURGERY)

## 2020-12-04 PROCEDURE — 76060000042 HC ANESTHESIA 5.5 TO 6 HR: Performed by: THORACIC SURGERY (CARDIOTHORACIC VASCULAR SURGERY)

## 2020-12-04 PROCEDURE — 2709999900 HC NON-CHARGEABLE SUPPLY

## 2020-12-04 PROCEDURE — P9045 ALBUMIN (HUMAN), 5%, 250 ML: HCPCS | Performed by: NURSE ANESTHETIST, CERTIFIED REGISTERED

## 2020-12-04 PROCEDURE — 77030002987 HC SUT PROL J&J -B: Performed by: THORACIC SURGERY (CARDIOTHORACIC VASCULAR SURGERY)

## 2020-12-04 PROCEDURE — 74011250636 HC RX REV CODE- 250/636

## 2020-12-04 PROCEDURE — 77030031139 HC SUT VCRL2 J&J -A: Performed by: THORACIC SURGERY (CARDIOTHORACIC VASCULAR SURGERY)

## 2020-12-04 PROCEDURE — 77030013797 HC KT TRNSDUC PRSSR EDWD -A: Performed by: THORACIC SURGERY (CARDIOTHORACIC VASCULAR SURGERY)

## 2020-12-04 PROCEDURE — 77030037476: Performed by: ANESTHESIOLOGY

## 2020-12-04 PROCEDURE — 74011000258 HC RX REV CODE- 258

## 2020-12-04 PROCEDURE — 77030002986 HC SUT PROL J&J -A: Performed by: THORACIC SURGERY (CARDIOTHORACIC VASCULAR SURGERY)

## 2020-12-04 PROCEDURE — 74011250637 HC RX REV CODE- 250/637: Performed by: THORACIC SURGERY (CARDIOTHORACIC VASCULAR SURGERY)

## 2020-12-04 PROCEDURE — 77030034888 HC SUT PROL 2 J&J -B: Performed by: THORACIC SURGERY (CARDIOTHORACIC VASCULAR SURGERY)

## 2020-12-04 PROCEDURE — 74011000250 HC RX REV CODE- 250: Performed by: THORACIC SURGERY (CARDIOTHORACIC VASCULAR SURGERY)

## 2020-12-04 PROCEDURE — 77030012390 HC DRN CHST BTL GTNG -B: Performed by: THORACIC SURGERY (CARDIOTHORACIC VASCULAR SURGERY)

## 2020-12-04 PROCEDURE — 80048 BASIC METABOLIC PNL TOTAL CA: CPT

## 2020-12-04 PROCEDURE — 77030037088 HC TUBE ENDOTRACH ORAL NSL COVD-A: Performed by: NURSE ANESTHETIST, CERTIFIED REGISTERED

## 2020-12-04 PROCEDURE — 02L70CK OCCLUSION OF LEFT ATRIAL APPENDAGE WITH EXTRALUMINAL DEVICE, OPEN APPROACH: ICD-10-PCS | Performed by: THORACIC SURGERY (CARDIOTHORACIC VASCULAR SURGERY)

## 2020-12-04 PROCEDURE — 77030018729 HC ELECTRD DEFIB PAD CARD -B: Performed by: THORACIC SURGERY (CARDIOTHORACIC VASCULAR SURGERY)

## 2020-12-04 PROCEDURE — 77030005518 HC CATH URETH FOL 2W BARD -B: Performed by: THORACIC SURGERY (CARDIOTHORACIC VASCULAR SURGERY)

## 2020-12-04 PROCEDURE — 77030009995: Performed by: THORACIC SURGERY (CARDIOTHORACIC VASCULAR SURGERY)

## 2020-12-04 PROCEDURE — 77030013292 HC BOWL MX PRSM J&J -A: Performed by: ANESTHESIOLOGY

## 2020-12-04 PROCEDURE — 77030013794 HC KT TRNSDUC BLD EDWD -B: Performed by: NURSE ANESTHETIST, CERTIFIED REGISTERED

## 2020-12-04 PROCEDURE — 86580 TB INTRADERMAL TEST: CPT | Performed by: THORACIC SURGERY (CARDIOTHORACIC VASCULAR SURGERY)

## 2020-12-04 PROCEDURE — 77030009355 HC CRDPLG DEL SET QUES -C: Performed by: THORACIC SURGERY (CARDIOTHORACIC VASCULAR SURGERY)

## 2020-12-04 PROCEDURE — 82803 BLOOD GASES ANY COMBINATION: CPT

## 2020-12-04 PROCEDURE — 82947 ASSAY GLUCOSE BLOOD QUANT: CPT

## 2020-12-04 PROCEDURE — 77030018571 HC SUT PROL1 J&J -B: Performed by: THORACIC SURGERY (CARDIOTHORACIC VASCULAR SURGERY)

## 2020-12-04 PROCEDURE — 77030025827 HC BG BLD DNR AUTLG MEDT -A: Performed by: THORACIC SURGERY (CARDIOTHORACIC VASCULAR SURGERY)

## 2020-12-04 PROCEDURE — 65610000006 HC RM INTENSIVE CARE

## 2020-12-04 PROCEDURE — 77030003010 HC SUT SURG STL J&J -B: Performed by: THORACIC SURGERY (CARDIOTHORACIC VASCULAR SURGERY)

## 2020-12-04 PROCEDURE — 77030025646 HC AUTOTRNSFUS KT TERU -C: Performed by: THORACIC SURGERY (CARDIOTHORACIC VASCULAR SURGERY)

## 2020-12-04 PROCEDURE — 85018 HEMOGLOBIN: CPT

## 2020-12-04 PROCEDURE — 74011250636 HC RX REV CODE- 250/636: Performed by: THORACIC SURGERY (CARDIOTHORACIC VASCULAR SURGERY)

## 2020-12-04 PROCEDURE — 74011250636 HC RX REV CODE- 250/636: Performed by: PHYSICIAN ASSISTANT

## 2020-12-04 PROCEDURE — 74011000250 HC RX REV CODE- 250

## 2020-12-04 PROCEDURE — 77030037126: Performed by: THORACIC SURGERY (CARDIOTHORACIC VASCULAR SURGERY)

## 2020-12-04 PROCEDURE — 77030018548 HC SUT ETHBND2 J&J -B: Performed by: THORACIC SURGERY (CARDIOTHORACIC VASCULAR SURGERY)

## 2020-12-04 PROCEDURE — 77030010512 HC APPL CLP LIG J&J -C: Performed by: THORACIC SURGERY (CARDIOTHORACIC VASCULAR SURGERY)

## 2020-12-04 PROCEDURE — 77030003037 HC SUT WRE STRNOTMY AEMC -B: Performed by: THORACIC SURGERY (CARDIOTHORACIC VASCULAR SURGERY)

## 2020-12-04 PROCEDURE — 77030041933 HC CLP ATRI LAA EXCL FLX ATRC -H: Performed by: THORACIC SURGERY (CARDIOTHORACIC VASCULAR SURGERY)

## 2020-12-04 PROCEDURE — 74011250637 HC RX REV CODE- 250/637: Performed by: PHYSICIAN ASSISTANT

## 2020-12-04 PROCEDURE — 77030018547 HC SUT ETHBND1 J&J -B: Performed by: THORACIC SURGERY (CARDIOTHORACIC VASCULAR SURGERY)

## 2020-12-04 PROCEDURE — 021109W BYPASS CORONARY ARTERY, TWO ARTERIES FROM AORTA WITH AUTOLOGOUS VENOUS TISSUE, OPEN APPROACH: ICD-10-PCS | Performed by: THORACIC SURGERY (CARDIOTHORACIC VASCULAR SURGERY)

## 2020-12-04 PROCEDURE — 93005 ELECTROCARDIOGRAM TRACING: CPT | Performed by: THORACIC SURGERY (CARDIOTHORACIC VASCULAR SURGERY)

## 2020-12-04 PROCEDURE — 02100Z9 BYPASS CORONARY ARTERY, ONE ARTERY FROM LEFT INTERNAL MAMMARY, OPEN APPROACH: ICD-10-PCS | Performed by: THORACIC SURGERY (CARDIOTHORACIC VASCULAR SURGERY)

## 2020-12-04 PROCEDURE — 5A1221Z PERFORMANCE OF CARDIAC OUTPUT, CONTINUOUS: ICD-10-PCS | Performed by: THORACIC SURGERY (CARDIOTHORACIC VASCULAR SURGERY)

## 2020-12-04 PROCEDURE — 77030020407 HC IV BLD WRMR ST 3M -A: Performed by: ANESTHESIOLOGY

## 2020-12-04 PROCEDURE — 77030020230: Performed by: NURSE ANESTHETIST, CERTIFIED REGISTERED

## 2020-12-04 PROCEDURE — 77030019908 HC STETH ESOPH SIMS -A: Performed by: ANESTHESIOLOGY

## 2020-12-04 PROCEDURE — 77030008771 HC TU NG SALEM SUMP -A: Performed by: NURSE ANESTHETIST, CERTIFIED REGISTERED

## 2020-12-04 PROCEDURE — 85730 THROMBOPLASTIN TIME PARTIAL: CPT

## 2020-12-04 PROCEDURE — 74011250636 HC RX REV CODE- 250/636: Performed by: NURSE ANESTHETIST, CERTIFIED REGISTERED

## 2020-12-04 PROCEDURE — 77030022199 HC SYS HARV VESL GTNG -G1: Performed by: THORACIC SURGERY (CARDIOTHORACIC VASCULAR SURGERY)

## 2020-12-04 PROCEDURE — C1729 CATH, DRAINAGE: HCPCS | Performed by: THORACIC SURGERY (CARDIOTHORACIC VASCULAR SURGERY)

## 2020-12-04 PROCEDURE — 74011250637 HC RX REV CODE- 250/637: Performed by: INTERNAL MEDICINE

## 2020-12-04 PROCEDURE — C1751 CATH, INF, PER/CENT/MIDLINE: HCPCS | Performed by: ANESTHESIOLOGY

## 2020-12-04 PROCEDURE — 77030016564 HC BLD STRNL SAW4 CNMD -B: Performed by: THORACIC SURGERY (CARDIOTHORACIC VASCULAR SURGERY)

## 2020-12-04 PROCEDURE — P9040 RBC LEUKOREDUCED IRRADIATED: HCPCS

## 2020-12-04 PROCEDURE — 77030012890

## 2020-12-04 PROCEDURE — 74011000272 HC RX REV CODE- 272: Performed by: THORACIC SURGERY (CARDIOTHORACIC VASCULAR SURGERY)

## 2020-12-04 PROCEDURE — 74011636637 HC RX REV CODE- 636/637: Performed by: NURSE ANESTHETIST, CERTIFIED REGISTERED

## 2020-12-04 PROCEDURE — 85027 COMPLETE CBC AUTOMATED: CPT

## 2020-12-04 PROCEDURE — 85384 FIBRINOGEN ACTIVITY: CPT

## 2020-12-04 PROCEDURE — 77030014007 HC SPNG HEMSTAT J&J -B: Performed by: THORACIC SURGERY (CARDIOTHORACIC VASCULAR SURGERY)

## 2020-12-04 PROCEDURE — 36415 COLL VENOUS BLD VENIPUNCTURE: CPT

## 2020-12-04 PROCEDURE — 77030018673: Performed by: THORACIC SURGERY (CARDIOTHORACIC VASCULAR SURGERY)

## 2020-12-04 PROCEDURE — 74011250637 HC RX REV CODE- 250/637: Performed by: NURSE PRACTITIONER

## 2020-12-04 PROCEDURE — C1769 GUIDE WIRE: HCPCS | Performed by: THORACIC SURGERY (CARDIOTHORACIC VASCULAR SURGERY)

## 2020-12-04 PROCEDURE — 99223 1ST HOSP IP/OBS HIGH 75: CPT | Performed by: INTERNAL MEDICINE

## 2020-12-04 PROCEDURE — 71045 X-RAY EXAM CHEST 1 VIEW: CPT

## 2020-12-04 PROCEDURE — 94002 VENT MGMT INPAT INIT DAY: CPT

## 2020-12-04 PROCEDURE — 2709999900 HC NON-CHARGEABLE SUPPLY: Performed by: THORACIC SURGERY (CARDIOTHORACIC VASCULAR SURGERY)

## 2020-12-04 PROCEDURE — 06BQ4ZZ EXCISION OF LEFT SAPHENOUS VEIN, PERCUTANEOUS ENDOSCOPIC APPROACH: ICD-10-PCS | Performed by: THORACIC SURGERY (CARDIOTHORACIC VASCULAR SURGERY)

## 2020-12-04 PROCEDURE — 76010000198 HC CV SURG 5 TO 5.5 HR INTENSV-TIER 1: Performed by: THORACIC SURGERY (CARDIOTHORACIC VASCULAR SURGERY)

## 2020-12-04 PROCEDURE — 77030002966 HC SUT PDS J&J -A: Performed by: THORACIC SURGERY (CARDIOTHORACIC VASCULAR SURGERY)

## 2020-12-04 PROCEDURE — 82962 GLUCOSE BLOOD TEST: CPT

## 2020-12-04 PROCEDURE — P9045 ALBUMIN (HUMAN), 5%, 250 ML: HCPCS | Performed by: THORACIC SURGERY (CARDIOTHORACIC VASCULAR SURGERY)

## 2020-12-04 PROCEDURE — 77030040922 HC BLNKT HYPOTHRM STRY -A: Performed by: ANESTHESIOLOGY

## 2020-12-04 PROCEDURE — 36600 WITHDRAWAL OF ARTERIAL BLOOD: CPT

## 2020-12-04 PROCEDURE — P9047 ALBUMIN (HUMAN), 25%, 50ML: HCPCS

## 2020-12-04 PROCEDURE — 77030039425 HC BLD LARYNG TRULITE DISP TELE -A: Performed by: NURSE ANESTHETIST, CERTIFIED REGISTERED

## 2020-12-04 PROCEDURE — 77030020751 HC FLTR TBNG TRNSFUS HAEM -A: Performed by: THORACIC SURGERY (CARDIOTHORACIC VASCULAR SURGERY)

## 2020-12-04 PROCEDURE — 85610 PROTHROMBIN TIME: CPT

## 2020-12-04 PROCEDURE — 77030013861 HC PNCH AORT CLNCUT QUES -B: Performed by: THORACIC SURGERY (CARDIOTHORACIC VASCULAR SURGERY)

## 2020-12-04 DEVICE — CLIP ATRIAL SYS EXCL 40MM --: Type: IMPLANTABLE DEVICE | Site: HEART | Status: FUNCTIONAL

## 2020-12-04 RX ORDER — NITROGLYCERIN 20 MG/100ML
INJECTION INTRAVENOUS
Status: DISCONTINUED | OUTPATIENT
Start: 2020-12-04 | End: 2020-12-04 | Stop reason: HOSPADM

## 2020-12-04 RX ORDER — SODIUM CHLORIDE 9 MG/ML
INJECTION, SOLUTION INTRAVENOUS
Status: DISCONTINUED | OUTPATIENT
Start: 2020-12-04 | End: 2020-12-04 | Stop reason: HOSPADM

## 2020-12-04 RX ORDER — MIDAZOLAM HYDROCHLORIDE 1 MG/ML
2 INJECTION, SOLUTION INTRAMUSCULAR; INTRAVENOUS
Status: DISCONTINUED | OUTPATIENT
Start: 2020-12-04 | End: 2020-12-04 | Stop reason: HOSPADM

## 2020-12-04 RX ORDER — MIDAZOLAM HYDROCHLORIDE 1 MG/ML
INJECTION, SOLUTION INTRAMUSCULAR; INTRAVENOUS AS NEEDED
Status: DISCONTINUED | OUTPATIENT
Start: 2020-12-04 | End: 2020-12-04 | Stop reason: HOSPADM

## 2020-12-04 RX ORDER — ROCURONIUM BROMIDE 10 MG/ML
INJECTION, SOLUTION INTRAVENOUS AS NEEDED
Status: DISCONTINUED | OUTPATIENT
Start: 2020-12-04 | End: 2020-12-04 | Stop reason: HOSPADM

## 2020-12-04 RX ORDER — NALOXONE HYDROCHLORIDE 0.4 MG/ML
0.4 INJECTION, SOLUTION INTRAMUSCULAR; INTRAVENOUS; SUBCUTANEOUS AS NEEDED
Status: DISCONTINUED | OUTPATIENT
Start: 2020-12-04 | End: 2020-12-05

## 2020-12-04 RX ORDER — SODIUM BICARBONATE 84 MG/ML
50 INJECTION, SOLUTION INTRAVENOUS AS NEEDED
Status: DISCONTINUED | OUTPATIENT
Start: 2020-12-04 | End: 2020-12-05

## 2020-12-04 RX ORDER — EPHEDRINE SULFATE/0.9% NACL/PF 50 MG/5 ML
SYRINGE (ML) INTRAVENOUS AS NEEDED
Status: DISCONTINUED | OUTPATIENT
Start: 2020-12-04 | End: 2020-12-04 | Stop reason: HOSPADM

## 2020-12-04 RX ORDER — ETOMIDATE 2 MG/ML
INJECTION INTRAVENOUS AS NEEDED
Status: DISCONTINUED | OUTPATIENT
Start: 2020-12-04 | End: 2020-12-04 | Stop reason: HOSPADM

## 2020-12-04 RX ORDER — ATORVASTATIN CALCIUM 80 MG/1
80 TABLET, FILM COATED ORAL
Status: DISCONTINUED | OUTPATIENT
Start: 2020-12-04 | End: 2020-12-04 | Stop reason: SDUPTHER

## 2020-12-04 RX ORDER — MORPHINE SULFATE 10 MG/ML
3-5 INJECTION, SOLUTION INTRAMUSCULAR; INTRAVENOUS
Status: DISCONTINUED | OUTPATIENT
Start: 2020-12-04 | End: 2020-12-05

## 2020-12-04 RX ORDER — APREPITANT 40 MG/1
40 CAPSULE ORAL ONCE
Status: COMPLETED | OUTPATIENT
Start: 2020-12-04 | End: 2020-12-04

## 2020-12-04 RX ORDER — ACETAMINOPHEN 500 MG
1000 TABLET ORAL ONCE
Status: COMPLETED | OUTPATIENT
Start: 2020-12-04 | End: 2020-12-04

## 2020-12-04 RX ORDER — AMIODARONE HYDROCHLORIDE 200 MG/1
200 TABLET ORAL EVERY 12 HOURS
Status: DISCONTINUED | OUTPATIENT
Start: 2020-12-04 | End: 2020-12-05

## 2020-12-04 RX ORDER — ALBUMIN HUMAN 50 G/1000ML
SOLUTION INTRAVENOUS
Status: COMPLETED | OUTPATIENT
Start: 2020-12-04 | End: 2020-12-04

## 2020-12-04 RX ORDER — LIDOCAINE HCL/PF 100 MG/5ML
50-100 SYRINGE (ML) INTRAVENOUS
Status: DISCONTINUED | OUTPATIENT
Start: 2020-12-04 | End: 2020-12-05

## 2020-12-04 RX ORDER — PROTAMINE SULFATE 10 MG/ML
INJECTION, SOLUTION INTRAVENOUS AS NEEDED
Status: DISCONTINUED | OUTPATIENT
Start: 2020-12-04 | End: 2020-12-04 | Stop reason: HOSPADM

## 2020-12-04 RX ORDER — MUPIROCIN 20 MG/G
OINTMENT TOPICAL 2 TIMES DAILY
Status: DISCONTINUED | OUTPATIENT
Start: 2020-12-04 | End: 2020-12-04

## 2020-12-04 RX ORDER — ALBUMIN HUMAN 50 G/1000ML
SOLUTION INTRAVENOUS
Status: COMPLETED
Start: 2020-12-04 | End: 2020-12-04

## 2020-12-04 RX ORDER — DEXTROSE 50 % IN WATER (D50W) INTRAVENOUS SYRINGE
25 AS NEEDED
Status: DISCONTINUED | OUTPATIENT
Start: 2020-12-04 | End: 2020-12-05

## 2020-12-04 RX ORDER — SODIUM CHLORIDE, SODIUM LACTATE, POTASSIUM CHLORIDE, CALCIUM CHLORIDE 600; 310; 30; 20 MG/100ML; MG/100ML; MG/100ML; MG/100ML
75 INJECTION, SOLUTION INTRAVENOUS CONTINUOUS
Status: DISCONTINUED | OUTPATIENT
Start: 2020-12-04 | End: 2020-12-04 | Stop reason: HOSPADM

## 2020-12-04 RX ORDER — LIDOCAINE HYDROCHLORIDE 20 MG/ML
INJECTION, SOLUTION EPIDURAL; INFILTRATION; INTRACAUDAL; PERINEURAL AS NEEDED
Status: DISCONTINUED | OUTPATIENT
Start: 2020-12-04 | End: 2020-12-04 | Stop reason: HOSPADM

## 2020-12-04 RX ORDER — SODIUM CHLORIDE, SODIUM LACTATE, POTASSIUM CHLORIDE, CALCIUM CHLORIDE 600; 310; 30; 20 MG/100ML; MG/100ML; MG/100ML; MG/100ML
INJECTION, SOLUTION INTRAVENOUS
Status: DISCONTINUED | OUTPATIENT
Start: 2020-12-04 | End: 2020-12-04 | Stop reason: HOSPADM

## 2020-12-04 RX ORDER — HEPARIN SODIUM 1000 [USP'U]/ML
INJECTION, SOLUTION INTRAVENOUS; SUBCUTANEOUS AS NEEDED
Status: DISCONTINUED | OUTPATIENT
Start: 2020-12-04 | End: 2020-12-04 | Stop reason: HOSPADM

## 2020-12-04 RX ORDER — LIDOCAINE HYDROCHLORIDE 10 MG/ML
0.1 INJECTION INFILTRATION; PERINEURAL AS NEEDED
Status: DISCONTINUED | OUTPATIENT
Start: 2020-12-04 | End: 2020-12-04 | Stop reason: HOSPADM

## 2020-12-04 RX ORDER — CEFAZOLIN SODIUM/WATER 2 G/20 ML
2 SYRINGE (ML) INTRAVENOUS EVERY 8 HOURS
Status: COMPLETED | OUTPATIENT
Start: 2020-12-04 | End: 2020-12-05

## 2020-12-04 RX ORDER — CARVEDILOL 3.12 MG/1
3.12 TABLET ORAL 2 TIMES DAILY WITH MEALS
Status: DISCONTINUED | OUTPATIENT
Start: 2020-12-05 | End: 2020-12-10 | Stop reason: HOSPADM

## 2020-12-04 RX ORDER — ALBUMIN HUMAN 50 G/1000ML
25 SOLUTION INTRAVENOUS AS NEEDED
Status: DISCONTINUED | OUTPATIENT
Start: 2020-12-04 | End: 2020-12-05

## 2020-12-04 RX ORDER — GUAIFENESIN 100 MG/5ML
81 LIQUID (ML) ORAL DAILY
Status: DISCONTINUED | OUTPATIENT
Start: 2020-12-05 | End: 2020-12-05

## 2020-12-04 RX ORDER — METOPROLOL TARTRATE 25 MG/1
25 TABLET, FILM COATED ORAL EVERY 12 HOURS
Status: DISCONTINUED | OUTPATIENT
Start: 2020-12-05 | End: 2020-12-04

## 2020-12-04 RX ORDER — VECURONIUM BROMIDE FOR INJECTION 1 MG/ML
INJECTION, POWDER, LYOPHILIZED, FOR SOLUTION INTRAVENOUS AS NEEDED
Status: DISCONTINUED | OUTPATIENT
Start: 2020-12-04 | End: 2020-12-04 | Stop reason: HOSPADM

## 2020-12-04 RX ORDER — SODIUM CHLORIDE 0.9 % (FLUSH) 0.9 %
5-40 SYRINGE (ML) INJECTION EVERY 8 HOURS
Status: DISCONTINUED | OUTPATIENT
Start: 2020-12-04 | End: 2020-12-05

## 2020-12-04 RX ORDER — ALBUMIN HUMAN 50 G/1000ML
SOLUTION INTRAVENOUS AS NEEDED
Status: DISCONTINUED | OUTPATIENT
Start: 2020-12-04 | End: 2020-12-04 | Stop reason: HOSPADM

## 2020-12-04 RX ORDER — NITROGLYCERIN 20 MG/100ML
10-100 INJECTION INTRAVENOUS
Status: DISCONTINUED | OUTPATIENT
Start: 2020-12-04 | End: 2020-12-05

## 2020-12-04 RX ORDER — PAPAVERINE HYDROCHLORIDE 30 MG/ML
INJECTION INTRAMUSCULAR; INTRAVENOUS AS NEEDED
Status: DISCONTINUED | OUTPATIENT
Start: 2020-12-04 | End: 2020-12-04 | Stop reason: HOSPADM

## 2020-12-04 RX ORDER — MAGNESIUM SULFATE 1 G/100ML
1 INJECTION INTRAVENOUS AS NEEDED
Status: DISCONTINUED | OUTPATIENT
Start: 2020-12-04 | End: 2020-12-05

## 2020-12-04 RX ORDER — MIDAZOLAM HYDROCHLORIDE 1 MG/ML
2 INJECTION, SOLUTION INTRAMUSCULAR; INTRAVENOUS ONCE
Status: DISCONTINUED | OUTPATIENT
Start: 2020-12-04 | End: 2020-12-04 | Stop reason: HOSPADM

## 2020-12-04 RX ORDER — SODIUM CHLORIDE 9 MG/ML
25 INJECTION, SOLUTION INTRAVENOUS CONTINUOUS
Status: DISCONTINUED | OUTPATIENT
Start: 2020-12-04 | End: 2020-12-05

## 2020-12-04 RX ORDER — HEPARIN SODIUM 200 [USP'U]/100ML
INJECTION, SOLUTION INTRAVENOUS AS NEEDED
Status: DISCONTINUED | OUTPATIENT
Start: 2020-12-04 | End: 2020-12-04 | Stop reason: HOSPADM

## 2020-12-04 RX ORDER — NITROGLYCERIN 20 MG/100ML
INJECTION INTRAVENOUS AS NEEDED
Status: DISCONTINUED | OUTPATIENT
Start: 2020-12-04 | End: 2020-12-04 | Stop reason: HOSPADM

## 2020-12-04 RX ORDER — DEXAMETHASONE SODIUM PHOSPHATE 100 MG/10ML
INJECTION INTRAMUSCULAR; INTRAVENOUS AS NEEDED
Status: DISCONTINUED | OUTPATIENT
Start: 2020-12-04 | End: 2020-12-04 | Stop reason: HOSPADM

## 2020-12-04 RX ORDER — FENTANYL CITRATE 50 UG/ML
100 INJECTION, SOLUTION INTRAMUSCULAR; INTRAVENOUS ONCE
Status: DISCONTINUED | OUTPATIENT
Start: 2020-12-04 | End: 2020-12-04 | Stop reason: HOSPADM

## 2020-12-04 RX ORDER — MIDAZOLAM HYDROCHLORIDE 1 MG/ML
1 INJECTION, SOLUTION INTRAMUSCULAR; INTRAVENOUS
Status: DISCONTINUED | OUTPATIENT
Start: 2020-12-04 | End: 2020-12-05

## 2020-12-04 RX ORDER — ONDANSETRON 2 MG/ML
INJECTION INTRAMUSCULAR; INTRAVENOUS AS NEEDED
Status: DISCONTINUED | OUTPATIENT
Start: 2020-12-04 | End: 2020-12-04 | Stop reason: HOSPADM

## 2020-12-04 RX ORDER — OXYCODONE AND ACETAMINOPHEN 5; 325 MG/1; MG/1
1 TABLET ORAL
Status: DISCONTINUED | OUTPATIENT
Start: 2020-12-04 | End: 2020-12-05

## 2020-12-04 RX ORDER — SODIUM CHLORIDE 0.9 % (FLUSH) 0.9 %
5-40 SYRINGE (ML) INJECTION AS NEEDED
Status: DISCONTINUED | OUTPATIENT
Start: 2020-12-04 | End: 2020-12-05

## 2020-12-04 RX ORDER — POTASSIUM CHLORIDE 14.9 MG/ML
10 INJECTION INTRAVENOUS AS NEEDED
Status: DISCONTINUED | OUTPATIENT
Start: 2020-12-04 | End: 2020-12-05

## 2020-12-04 RX ORDER — CHLORHEXIDINE GLUCONATE 1.2 MG/ML
10 RINSE ORAL 2 TIMES DAILY
Status: DISCONTINUED | OUTPATIENT
Start: 2020-12-04 | End: 2020-12-05

## 2020-12-04 RX ORDER — SUFENTANIL CITRATE 50 UG/ML
INJECTION EPIDURAL; INTRAVENOUS AS NEEDED
Status: DISCONTINUED | OUTPATIENT
Start: 2020-12-04 | End: 2020-12-04 | Stop reason: HOSPADM

## 2020-12-04 RX ORDER — PROPOFOL 10 MG/ML
0-50 VIAL (ML) INTRAVENOUS
Status: DISCONTINUED | OUTPATIENT
Start: 2020-12-04 | End: 2020-12-04

## 2020-12-04 RX ORDER — DEXTROSE, SODIUM CHLORIDE, AND POTASSIUM CHLORIDE 5; .45; .15 G/100ML; G/100ML; G/100ML
25 INJECTION INTRAVENOUS CONTINUOUS
Status: DISCONTINUED | OUTPATIENT
Start: 2020-12-04 | End: 2020-12-05

## 2020-12-04 RX ADMIN — VECURONIUM BROMIDE 2 MG: 1 INJECTION, POWDER, LYOPHILIZED, FOR SOLUTION INTRAVENOUS at 08:30

## 2020-12-04 RX ADMIN — Medication 5 MG: at 09:41

## 2020-12-04 RX ADMIN — MIDAZOLAM 5 MG: 1 INJECTION INTRAMUSCULAR; INTRAVENOUS at 11:04

## 2020-12-04 RX ADMIN — SODIUM CHLORIDE 1 UNITS/HR: 9 INJECTION, SOLUTION INTRAVENOUS at 08:14

## 2020-12-04 RX ADMIN — APREPITANT 40 MG: 40 CAPSULE ORAL at 06:59

## 2020-12-04 RX ADMIN — CEFAZOLIN SODIUM 2 G: 100 INJECTION, POWDER, LYOPHILIZED, FOR SOLUTION INTRAVENOUS at 19:37

## 2020-12-04 RX ADMIN — MIDAZOLAM 2 MG: 1 INJECTION INTRAMUSCULAR; INTRAVENOUS at 07:16

## 2020-12-04 RX ADMIN — SUFENTANIL CITRATE 25 MCG: 50 INJECTION EPIDURAL; INTRAVENOUS at 09:18

## 2020-12-04 RX ADMIN — SODIUM CHLORIDE 1 UNITS: 9 INJECTION, SOLUTION INTRAVENOUS at 10:18

## 2020-12-04 RX ADMIN — NITROGLYCERIN 10 MCG: 200 INJECTION, SOLUTION INTRAVENOUS at 09:52

## 2020-12-04 RX ADMIN — NITROGLYCERIN 1 INCH: 20 OINTMENT TOPICAL at 00:07

## 2020-12-04 RX ADMIN — Medication 3 AMPULE: at 04:35

## 2020-12-04 RX ADMIN — NITROGLYCERIN 20 MCG: 200 INJECTION, SOLUTION INTRAVENOUS at 09:54

## 2020-12-04 RX ADMIN — SODIUM CHLORIDE: 900 INJECTION, SOLUTION INTRAVENOUS at 08:05

## 2020-12-04 RX ADMIN — TUBERCULIN PURIFIED PROTEIN DERIVATIVE 5 UNITS: 5 INJECTION, SOLUTION INTRADERMAL at 22:00

## 2020-12-04 RX ADMIN — PHENYLEPHRINE HYDROCHLORIDE 120 MCG: 10 INJECTION INTRAVENOUS at 07:52

## 2020-12-04 RX ADMIN — HEPARIN SODIUM 20000 UNITS: 1000 INJECTION, SOLUTION INTRAVENOUS; SUBCUTANEOUS at 10:02

## 2020-12-04 RX ADMIN — ROCURONIUM BROMIDE 50 MG: 10 INJECTION, SOLUTION INTRAVENOUS at 07:31

## 2020-12-04 RX ADMIN — AMINOCAPROIC ACID 10 G: 250 INJECTION, SOLUTION INTRAVENOUS at 08:05

## 2020-12-04 RX ADMIN — DEXAMETHASONE SODIUM PHOSPHATE 10 MG: 10 INJECTION INTRAMUSCULAR; INTRAVENOUS at 09:30

## 2020-12-04 RX ADMIN — MIDAZOLAM 1 MG: 1 INJECTION INTRAMUSCULAR; INTRAVENOUS at 07:24

## 2020-12-04 RX ADMIN — OXYCODONE HYDROCHLORIDE AND ACETAMINOPHEN 1 TABLET: 5; 325 TABLET ORAL at 21:53

## 2020-12-04 RX ADMIN — PHENYLEPHRINE HYDROCHLORIDE 60 MCG: 10 INJECTION INTRAVENOUS at 11:43

## 2020-12-04 RX ADMIN — Medication 5 MG: at 07:39

## 2020-12-04 RX ADMIN — Medication 5 MG: at 10:02

## 2020-12-04 RX ADMIN — Medication 40 ML: at 13:14

## 2020-12-04 RX ADMIN — PHENYLEPHRINE HYDROCHLORIDE 60 MCG: 10 INJECTION INTRAVENOUS at 07:47

## 2020-12-04 RX ADMIN — PROTAMINE SULFATE 180 MG: 10 INJECTION, SOLUTION INTRAVENOUS at 11:43

## 2020-12-04 RX ADMIN — ROCURONIUM BROMIDE 50 MG: 10 INJECTION, SOLUTION INTRAVENOUS at 11:04

## 2020-12-04 RX ADMIN — SODIUM CHLORIDE, SODIUM LACTATE, POTASSIUM CHLORIDE, AND CALCIUM CHLORIDE: 600; 310; 30; 20 INJECTION, SOLUTION INTRAVENOUS at 07:12

## 2020-12-04 RX ADMIN — POTASSIUM CHLORIDE, DEXTROSE MONOHYDRATE AND SODIUM CHLORIDE 25 ML/HR: 150; 5; 450 INJECTION, SOLUTION INTRAVENOUS at 13:45

## 2020-12-04 RX ADMIN — Medication 2 G: at 08:18

## 2020-12-04 RX ADMIN — AMIODARONE HYDROCHLORIDE 200 MG: 200 TABLET ORAL at 21:54

## 2020-12-04 RX ADMIN — PHENYLEPHRINE HYDROCHLORIDE 120 MCG: 10 INJECTION INTRAVENOUS at 12:17

## 2020-12-04 RX ADMIN — Medication 5 MG: at 07:47

## 2020-12-04 RX ADMIN — PHENYLEPHRINE HYDROCHLORIDE 60 MCG: 10 INJECTION INTRAVENOUS at 10:22

## 2020-12-04 RX ADMIN — Medication 2 G: at 12:00

## 2020-12-04 RX ADMIN — CHLORHEXIDINE GLUCONATE 10 ML: 1.2 RINSE ORAL at 17:04

## 2020-12-04 RX ADMIN — PHENYLEPHRINE HYDROCHLORIDE 60 MCG: 10 INJECTION INTRAVENOUS at 10:23

## 2020-12-04 RX ADMIN — SUFENTANIL CITRATE 25 MCG: 50 INJECTION EPIDURAL; INTRAVENOUS at 09:22

## 2020-12-04 RX ADMIN — SODIUM CHLORIDE, SODIUM LACTATE, POTASSIUM CHLORIDE, AND CALCIUM CHLORIDE 75 ML/HR: 600; 310; 30; 20 INJECTION, SOLUTION INTRAVENOUS at 06:02

## 2020-12-04 RX ADMIN — ONDANSETRON 4 MG: 2 INJECTION INTRAMUSCULAR; INTRAVENOUS at 12:00

## 2020-12-04 RX ADMIN — ACETAMINOPHEN 1000 MG: 500 TABLET ORAL at 06:03

## 2020-12-04 RX ADMIN — SODIUM CHLORIDE 2 UNITS: 9 INJECTION, SOLUTION INTRAVENOUS at 10:43

## 2020-12-04 RX ADMIN — SUFENTANIL CITRATE 10 MCG: 50 INJECTION EPIDURAL; INTRAVENOUS at 08:40

## 2020-12-04 RX ADMIN — ALBUMIN HUMAN 250 ML: 0.05 INJECTION, SOLUTION INTRAVENOUS at 12:12

## 2020-12-04 RX ADMIN — POTASSIUM CHLORIDE 10 MEQ: 14.9 INJECTION, SOLUTION INTRAVENOUS at 13:45

## 2020-12-04 RX ADMIN — SODIUM CHLORIDE 2 UNITS: 9 INJECTION, SOLUTION INTRAVENOUS at 12:13

## 2020-12-04 RX ADMIN — Medication 5 MG: at 07:50

## 2020-12-04 RX ADMIN — ASPIRIN 81 MG: 81 TABLET ORAL at 04:35

## 2020-12-04 RX ADMIN — Medication 5 MG: at 07:56

## 2020-12-04 RX ADMIN — ALBUMIN (HUMAN) 25 G: 12.5 INJECTION, SOLUTION INTRAVENOUS at 17:19

## 2020-12-04 RX ADMIN — SUFENTANIL CITRATE 25 MCG: 50 INJECTION EPIDURAL; INTRAVENOUS at 09:50

## 2020-12-04 RX ADMIN — Medication 10 ML: at 22:02

## 2020-12-04 RX ADMIN — Medication 5 MG: at 09:35

## 2020-12-04 RX ADMIN — SUFENTANIL CITRATE 25 MCG: 50 INJECTION EPIDURAL; INTRAVENOUS at 09:46

## 2020-12-04 RX ADMIN — HUMAN INSULIN 2 UNITS: 100 INJECTION, SOLUTION SUBCUTANEOUS at 11:16

## 2020-12-04 RX ADMIN — EPINEPHRINE 0.02 MCG/KG/MIN: 1 INJECTION INTRAMUSCULAR; INTRAVENOUS; SUBCUTANEOUS at 11:31

## 2020-12-04 RX ADMIN — SUFENTANIL CITRATE 15 MCG: 50 INJECTION EPIDURAL; INTRAVENOUS at 08:56

## 2020-12-04 RX ADMIN — Medication 1 AMPULE: at 21:53

## 2020-12-04 RX ADMIN — SUFENTANIL CITRATE 25 MCG: 50 INJECTION EPIDURAL; INTRAVENOUS at 07:30

## 2020-12-04 RX ADMIN — PHENYLEPHRINE HYDROCHLORIDE 120 MCG: 10 INJECTION INTRAVENOUS at 07:57

## 2020-12-04 RX ADMIN — VECURONIUM BROMIDE 3 MG: 1 INJECTION, POWDER, LYOPHILIZED, FOR SOLUTION INTRAVENOUS at 08:56

## 2020-12-04 RX ADMIN — POTASSIUM CHLORIDE 10 MEQ: 14.9 INJECTION, SOLUTION INTRAVENOUS at 14:46

## 2020-12-04 RX ADMIN — Medication 5 MG: at 07:48

## 2020-12-04 RX ADMIN — ETOMIDATE 8 MG: 2 INJECTION, SOLUTION INTRAVENOUS at 07:30

## 2020-12-04 RX ADMIN — Medication 5 MG: at 09:28

## 2020-12-04 RX ADMIN — ALBUMIN HUMAN 25 G: 50 SOLUTION INTRAVENOUS at 17:19

## 2020-12-04 RX ADMIN — SODIUM CHLORIDE 25 ML/HR: 900 INJECTION, SOLUTION INTRAVENOUS at 13:24

## 2020-12-04 RX ADMIN — SUFENTANIL CITRATE 50 MCG: 50 INJECTION EPIDURAL; INTRAVENOUS at 09:52

## 2020-12-04 RX ADMIN — SUFENTANIL CITRATE 25 MCG: 50 INJECTION EPIDURAL; INTRAVENOUS at 09:47

## 2020-12-04 RX ADMIN — Medication 5 MG: at 07:57

## 2020-12-04 RX ADMIN — MIDAZOLAM 2 MG: 1 INJECTION INTRAMUSCULAR; INTRAVENOUS at 07:30

## 2020-12-04 RX ADMIN — PANTOPRAZOLE SODIUM 40 MG: 40 TABLET, DELAYED RELEASE ORAL at 04:34

## 2020-12-04 RX ADMIN — AMINOCAPROIC ACID 5 G: 250 INJECTION, SOLUTION INTRAVENOUS at 08:44

## 2020-12-04 RX ADMIN — SODIUM CHLORIDE, SODIUM LACTATE, POTASSIUM CHLORIDE, AND CALCIUM CHLORIDE: 600; 310; 30; 20 INJECTION, SOLUTION INTRAVENOUS at 08:05

## 2020-12-04 RX ADMIN — PHENYLEPHRINE HYDROCHLORIDE 60 MCG: 10 INJECTION INTRAVENOUS at 07:39

## 2020-12-04 RX ADMIN — SUFENTANIL CITRATE 25 MCG: 50 INJECTION EPIDURAL; INTRAVENOUS at 09:49

## 2020-12-04 RX ADMIN — SODIUM CHLORIDE: 900 INJECTION, SOLUTION INTRAVENOUS at 11:54

## 2020-12-04 RX ADMIN — LIDOCAINE HYDROCHLORIDE 80 MG: 20 INJECTION, SOLUTION EPIDURAL; INFILTRATION; INTRACAUDAL; PERINEURAL at 07:30

## 2020-12-04 RX ADMIN — Medication 10 ML: at 13:13

## 2020-12-04 RX ADMIN — NITROGLYCERIN 10 MCG: 200 INJECTION, SOLUTION INTRAVENOUS at 09:48

## 2020-12-04 RX ADMIN — NITROGLYCERIN 10 MCG/MIN: 200 INJECTION, SOLUTION INTRAVENOUS at 08:05

## 2020-12-04 RX ADMIN — Medication 5 MG: at 07:52

## 2020-12-04 RX ADMIN — POTASSIUM CHLORIDE 10 MEQ: 14.9 INJECTION, SOLUTION INTRAVENOUS at 17:42

## 2020-12-04 RX ADMIN — ROSUVASTATIN CALCIUM 20 MG: 20 TABLET, COATED ORAL at 21:54

## 2020-12-04 RX ADMIN — AMIODARONE HYDROCHLORIDE 600 MG: 200 TABLET ORAL at 04:34

## 2020-12-04 NOTE — OP NOTES
[facility]  OPERATIVE REPORT    Name:  Dulce Trevino  MR#:  [id number]  :  1945  ACCOUNT #:  [de-identified]  DATE OF SERVICE:  2020    PREOPERATIVE DIAGNOSES:  1.  Non-ST elevation myocardial infarction. 2.  Multivessel atherosclerotic coronary artery disease. POSTOPERATIVE DIAGNOSES:  1.  Non-ST elevation myocardial infarction. 2.  Multivessel atherosclerotic coronary artery disease. PROCEDURES PERFORMED:  1. Endoscopic vein harvest left leg. 2.  Clipping of the left atrial appendage with a 40-mm AtriClip. 3.  Coronary artery bypass grafting x3. Grafts consisting of:  1. Left internal mammary artery to the diagonal.  2.  Reverse vein graft to the OM. 3.  Reverse vein graft to the PDA. SURGEON:  Vadim Bryson. Ernestine Duarte MD    ASSISTANT:      ANESTHESIA:  General endotracheal.    COMPLICATIONS:  None. SPECIMENS REMOVED:  .    IMPLANTS:  .    ESTIMATED BLOOD LOSS:  Minimal.    OPERATIVE FINDINGS:  Saphenous vein 2 to 4 mm, LIMA 2.5 mm, ascending aorta was soft, distally; however, around the sino-tubular junction, there were palpable calcifications, diagonal was 1.3 mm severe disease,  1.4 mm severe disease, PDA was 1.4 mm severe disease. INDICATION:  The patient is a very pleasant 67-year-old lady with a known long history of GI bleeding, who presented with an NSTEMI. She was noted to have dropped her hemoglobin significantly, underwent transfusion. Left heart cath showed severe three-vessel disease. Because of her history of GI ablation, she was not a candidate for dual antiplatelet therapy with PCI and was referred for CABG. DESCRIPTION OF PROCEDURE:  After informed consent was obtained for the above-mentioned procedure, the patient was then taken to the operating room. Appropriate monitoring lines were placed by the Anesthesia Department.   After administration of general endotracheal anesthesia, the patient was prepped and draped in the usual fashion with Betadine scrub and paint and Ioban cardiovascular drapes. The chest was then entered through a standard mediastinotomy incision while simultaneous harvesting of peripheral conduit was undertaken. After harvesting the conduit, it was inspected and noted to be adequate. The incisions were subsequently closed in a 2-layer fashion of 3-0 and 4-0 Vicryl. The left internal mammary harvest was then undertaken in pedicle fashion and was injected with Papaverine solution. After administration of heparin, the distal pedicle was incised and noted to bleed briskly. A thoracostomy tube was then placed. This was brought out through a separate stab incision inferiorly and affixed to the skin. The pericardium was then opened and tacked up into a pericardial well, revealing the heart. Pursestrings were then placed into the aorta, the right atrial appendage, and the right atrium. After adequate ACT was obtained, the patient was then cannulated through these pursestrings, at which point cardiopulmonary bypass was started. Target vessels were identified and marked. A crossclamp was then applied. Antegrade and retrograde cardioplegia was administered initially and then given intermittently throughout the case. A latticed heart support was placed to assist with the distal anastomoses, which were performed by using 7-0 Prolene for vein to artery anastomosis and 8-0 Prolene for artery-to-artery anastomosis. Proximal anastomoses to the aorta were also placed using 6-0 Prolene. At the conclusion of the final proximal anastomosis, the aorta and right ventricle were flushed of debris and the anastomosis was completed. The crossclamp was then removed. The heart then underwent meticulous de-airing. The patient was warmed and weaned from the cardiopulmonary bypass. The distal anastomoses were visualized and noted to be hemostatic. All grafts were dopplered and noted to have an excellent flow.   The venous cannula was then removed. Ventricular and atrial pacing wires were then placed upon the heart, brought out through stab wounds inferiorly and affixed to the skin. A single straight mediastinal tube was placed, brought out through a separate stab incision inferiorly, and also affixed to the skin. After meticulous hemostasis was made, the sternum was reapproximated with heavy gauge stainless steel wire. The midline fascia was subsequently closed separately. Vicryl was then used in a running fashion for subcutaneous tissue and skin. Dressings were then applied to all wounds. The patient was then transported with monitors to the intensive care unit intubated and ventilated. All instruments and sponge counts were correct.         Dominique Brown MD      [id number]  D:  12/04/2020 13:13  T:  12/04/2020 16:39  JOB #:  [id number]

## 2020-12-04 NOTE — PROGRESS NOTES
Ventilator check complete; patient has a #7. 0 ET tube secured at the 24 at the lip. .  Breath sounds are coarse and diminished. Trachea is midline, Negative for subcutaneous air, and chest excursion is symmetric. Patient is also Negative for cyanosis and is Negative for pitting edema. All alarms are set and audible. Resuscitation bag is at the head of the bed. Ventilator Settings  Mode FIO2 Rate Tidal Volume Pressure PEEP I:E Ratio      60 %    0.5 ml  0 cm H2O            Peak airway pressure: 24 cm H2O   Minute ventilation: 9.32 l/min     ABG: No results for input(s): PH, PCO2, PO2, HCO3 in the last 72 hours.       Radha Landaverde, RT

## 2020-12-04 NOTE — PERIOP NOTES
.. TRANSFER - OUT REPORT:    Verbal report given to AGUSTO Deshpande on [employer]  being transferred to [facility] for routine progression of care       Report consisted of patients Situation, Background, Assessment and   Recommendations(SBAR). Information from the following report(s) OR Summary was reviewed with the receiving nurse. Lines:   Double Lumen 12/04/20 Right Internal jugular (Active)       Peripheral IV 11/29/20 Right Antecubital (Active)   Site Assessment Clean, dry, & intact 12/04/20 0415   Phlebitis Assessment 0 12/04/20 0415   Infiltration Assessment 0 12/04/20 0415   Dressing Status Clean, dry, & intact 12/04/20 0415   Dressing Type Transparent;Tape 12/04/20 0415   Hub Color/Line Status Outdated; Flushed 12/04/20 0415   Alcohol Cap Used No 12/03/20 1600       Peripheral IV 12/01/20 Left Arm (Active)   Site Assessment Clean, dry, & intact 12/04/20 0415   Phlebitis Assessment 0 12/04/20 0415   Infiltration Assessment 0 12/04/20 0415   Dressing Status Clean, dry, & intact 12/04/20 0415   Dressing Type Transparent;Tape 12/04/20 0415   Hub Color/Line Status Patent; Flushed 12/04/20 0415   Alcohol Cap Used No 12/03/20 1600       Arterial Line 12/04/20 Left Radial artery (Active)        Opportunity for questions and clarification was provided.       Patient transported with:   Monitor  O2 @ 15 liters  Patient-specific medications from Pharmacy  Registered Nurse

## 2020-12-04 NOTE — CONSULTS
Cardiovascular ICU Consult Note: 12/4/2020  Anitha Zepeda  Admission Date: 11/29/2020     The patient's chart is reviewed and the patient is discussed with the staff. Subjective: This is a 76-year-old female with known history of peripheral vascular disease and carotid artery disease admitted 11/29 after a syncopal episode. She has had exertional chest pain concerning for angina several months. She states she awoke early in the morning and was going to her bathroom when she had a syncopal event. Felt \"dizziness\" prior to the event but no tachycardia or chest discomfort. She has been dealing with significant anemia and has required iron and packed red blood cell transfusion  She was transferred to [facility]. Troponin continued to rise consistent with NSTEMI. She underwent cardiac catheterization today that showed high grade stenosis in the LCx and RCA. LV gram showed mild to moderately reduced LV function with inferolateral hypokinesis. There was no significant gradient across the AV. She has \"smoldering\" multiple myeloma as well as TAYLOR. Patient is seen at the request of Dr. Yana Thompson for respiratory management status post cardiac surgery. Patient had cabg x 3. Currently is sedated in CV-ICU and orally intubated receiving  mechanical ventilation. We have been asked to see in the CV-ICU for mechanical ventilation management and weaning. Prior to Admission Medications   Prescriptions Last Dose Informant Patient Reported? Taking? ASCORBIC ACID (VITAMIN C PO) 11/22/2020 at Unknown time  Yes Yes   Sig: Take 1 Tab by mouth. Couple of times a week   acetaminophen (TYLENOL) 325 mg tablet 12/1/2020 at Unknown time  Yes Yes   Sig: Take 325 mg by mouth as needed for Pain. aspirin delayed-release (ASPIRIN EC) 81 mg tablet 11/29/2020 at Unknown time  Yes Yes   Sig: Take 81 mg by mouth every morning.    cholecalciferol, vitamin D3, (VITAMIN D3) 2,000 unit tab 11/22/2020 at Unknown time  Yes Yes Sig: Take 2,000 Units by mouth. Couple times a week   esomeprazole (NEXIUM) 40 mg capsule 2020 at Unknown time  Yes Yes   Sig: Take 20 mg by mouth every morning. hydroCHLOROthiazide 25 mg tab 25 mg, lisinopril 20 mg tab 20 mg 2020 at Unknown time  Yes Yes   Sig: Take  by mouth daily. nitroglycerin (NITROSTAT) 0.4 mg SL tablet 2020 at Unknown time  No Yes   Si Tab by SubLINGual route every five (5) minutes as needed for Chest Pain. Up to 3 doses. rosuvastatin (CRESTOR) 5 mg tablet 2020 at Unknown time  Yes Yes   Sig: Take  by mouth nightly. vitamin E (AQUA GEMS) 400 unit capsule 2020 at Unknown time  Yes Yes   Sig: Take 400 Units by mouth. Couple times a week      Facility-Administered Medications: None       Review of Systems  Review of systems not obtained due to patient factors. Past Medical History:   Diagnosis Date    Anemia     Mcneil's esophagus     Coronary atherosclerosis of native coronary vessel     no stents, no CABG, increased calcium score    GERD (gastroesophageal reflux disease)     daily meds    HLD (hyperlipidemia)     Hypercholesterolemia     Hypertension     Mild aortic stenosis     echo: 3/24/17  GINETTE 1.7 cm2, peak velocity 2.2 m/s, mean pressure gradient 10 mmHg, peak pressure gradient 212 mm Hg.      Occlusion and stenosis of carotid artery without mention of cerebral infarction     Asymptomatic bilateral 50-69%    Osteoarthritis     feet    [facility]peripheral artery [facility]) ([facility])     PONV (postoperative nausea and vomiting)     S/P insertion of iliac artery stent     left    Shingles      Past Surgical History:   Procedure Laterality Date    HX ANGIOPLASTY      Left common iliac artery with stent    HX APPENDECTOMY      HX CAROTID ENDARTERECTOMY Left 2017    HX CAROTID ENDARTERECTOMY Right 05/15/2017    HX COLONOSCOPY      for GI bleed    HX ENDOSCOPY      multiple scopes for upper GI bleed    HX HYSTERECTOMY      HX ORTHOPAEDIC      lumbar SURGERY SPINAL STENOSIS    HX TONSILLECTOMY      VASCULAR SURGERY PROCEDURE UNLIST Left 2006    iliac artery stent     Social History     Socioeconomic History    Marital status:      Spouse name: Not on file    Number of children: Not on file    Years of education: Not on file    Highest education level: Not on file   Occupational History    Not on file   Social Needs    Financial resource strain: Not on file    Food insecurity     Worry: Not on file     Inability: Not on file    Transportation needs     Medical: Not on file     Non-medical: Not on file   Tobacco Use    Smoking status: Former Smoker     Last attempt to quit: 1977     Years since quittin.2    Smokeless tobacco: Never Used   Substance and Sexual Activity    Alcohol use: No    Drug use: No    Sexual activity: Not on file   Lifestyle    Physical activity     Days per week: Not on file     Minutes per session: Not on file    Stress: Not on file   Relationships    Social connections     Talks on phone: Not on file     Gets together: Not on file     Attends Hoahaoism service: Not on file     Active member of club or organization: Not on file     Attends meetings of clubs or organizations: Not on file     Relationship status: Not on file    Intimate partner violence     Fear of current or ex partner: Not on file     Emotionally abused: Not on file     Physically abused: Not on file     Forced sexual activity: Not on file   Other Topics Concern    Not on file   Social History Narrative    Not on file     Family History   Problem Relation Age of Onset    Hypertension Father     Stroke Father     Dementia Mother      Allergies   Allergen Reactions    Toprol Xl [Metoprolol Succinate] Swelling     Pt states extremity swelling when taking this medication       Current Facility-Administered Medications   Medication Dose Route Frequency    0.9% sodium chloride infusion  25 mL/hr IntraVENous CONTINUOUS    dextrose 5% - 0.45% NaCl with KCl 20 mEq/L infusion  25 mL/hr IntraVENous CONTINUOUS    sodium chloride (NS) flush 5-40 mL  5-40 mL IntraVENous Q8H    sodium chloride (NS) flush 5-40 mL  5-40 mL IntraVENous PRN    oxyCODONE-acetaminophen (PERCOCET) 5-325 mg per tablet 1 Tab  1 Tab Oral Q4H PRN    morphine 10 mg/ml injection 3-5 mg  3-5 mg IntraVENous Q1H PRN    naloxone (NARCAN) injection 0.4 mg  0.4 mg IntraVENous PRN    mupirocin (BACTROBAN) 2 % ointment   Both Nostrils BID    ceFAZolin (ANCEF) 2 g/20 mL in sterile water IV syringe  2 g IntraVENous Q8H    sodium bicarbonate (8.4%) injection 50 mEq  50 mEq IntraVENous PRN    EPINEPHrine (ADRENALIN) 4 mg in 0.9% sodium chloride 250 mL infusion  0.01-0.5 mcg/kg/min IntraVENous TITRATE    nitroglycerin (Tridil) 200 mcg/ml infusion   mcg/min IntraVENous TITRATE    PHENYLephrine (HAYDEN-SYNEPHRINE) 30 mg in 0.9% sodium chloride 250 mL infusion   mcg/min IntraVENous TITRATE    lidocaine (PF) (XYLOCAINE) 100 mg/5 mL (2 %) injection syringe  mg   mg IntraVENous ONCE PRN    niCARdipine in Saline (CARDENE) 25 MG/250 mL infusion kit  5-15 mg/hr IntraVENous TITRATE    amiodarone (CORDARONE) tablet 200 mg  200 mg Oral Q12H    [START ON 12/5/2020] metoprolol tartrate (LOPRESSOR) tablet 25 mg  25 mg Oral Q12H    atorvastatin (LIPITOR) tablet 80 mg  80 mg Oral QHS    insulin regular (NOVOLIN R, HUMULIN R) 100 Units in 0.9% sodium chloride 100 mL infusion  1 Units/hr IntraVENous TITRATE    dextrose (D50W) injection syrg 12.5 g  25 mL IntraVENous PRN    [START ON 12/5/2020] aspirin chewable tablet 81 mg  81 mg Oral DAILY    magnesium sulfate 1 g/100 ml IVPB (premix or compounded)  1 g IntraVENous PRN    potassium chloride 10 mEq in 50 ml IVPB  10 mEq IntraVENous PRN    midazolam (VERSED) injection 1 mg  1 mg IntraVENous Q1H PRN    propofol (DIPRIVAN) 10 mg/mL infusion  0-50 mcg/kg/min IntraVENous TITRATE    meperidine (DEMEROL) injection 25 mg  25 mg IntraVENous Q1H PRN    chlorhexidine (PERIDEX) 0.12 % mouthwash 10 mL  10 mL Oral BID    tuberculin injection 5 Units  5 Units IntraDERMal ONCE    alcohol 62% (NOZIN) nasal  1 Ampule  1 Ampule Topical Q12H    EPINEPHrine (ADRENALIN) 4 mg in 0.9% sodium chloride 250 mL infusion  1-10 mcg/min IntraVENous TITRATE    heparin 10,000 units in NS 1000 ml irrigation  1,000 mL Irrigation ONCE    insulin regular (NOVOLIN R, HUMULIN R) 100 Units in 0.9% sodium chloride 100 mL infusion  1-10 Units/hr IntraVENous TITRATE    influenza vaccine 2020-21 (6 mos+)(PF) (FLUARIX/FLULAVAL/FLUZONE QUAD) injection 0.5 mL  0.5 mL IntraMUSCular PRIOR TO DISCHARGE    0.9% sodium chloride infusion 250 mL  250 mL IntraVENous PRN    rosuvastatin (CRESTOR) tablet 20 mg  20 mg Oral QHS    heparin (PF) 2 units/ml in NS infusion  3 mL/hr IntraVENous CONTINUOUS    lip protectant (BLISTEX) ointment 1 Each  1 Each Topical PRN    acetaminophen (TYLENOL) tablet 325 mg  325 mg Oral Q6H PRN    aspirin delayed-release tablet 81 mg  81 mg Oral 7am    pantoprazole (PROTONIX) tablet 40 mg  40 mg Oral ACB    sodium chloride (NS) flush 5-40 mL  5-40 mL IntraVENous Q8H    sodium chloride (NS) flush 5-40 mL  5-40 mL IntraVENous PRN    nitroglycerin (NITROBID) 2 % ointment 1 Inch  1 Inch Topical Q6H    nitroglycerin (NITROSTAT) tablet 0.4 mg  0.4 mg SubLINGual Q5MIN PRN    naloxone (NARCAN) injection 0.4 mg  0.4 mg IntraVENous PRN    ondansetron (ZOFRAN) injection 4 mg  4 mg IntraVENous Q4H PRN    alum-mag hydroxide-simeth (MYLANTA) oral suspension 30 mL  30 mL Oral Q4H PRN    diphenhydrAMINE (BENADRYL) capsule 25 mg  25 mg Oral Q6H PRN    docusate sodium (COLACE) capsule 100 mg  100 mg Oral BID PRN    lisinopriL (PRINIVIL, ZESTRIL) tablet 20 mg  20 mg Oral DAILY         Objective:     Vitals:    12/04/20 1247 12/04/20 1250 12/04/20 1255 12/04/20 1300   BP: (!) 120/47      Pulse: 82 82 83 86   Resp: 15 18 18 19   Temp: 98 °F (36.7 °C) 97.8 °F (36.6 °C) (!) 93.3 °F (34.1 °C) 98.7 °F (37.1 °C)   SpO2: 99% 98% 98% 98%   Weight:       Height:           Intake and Output:   No intake/output data recorded. 12/04 0701 - 12/04 1900  In: 2335 [I.V.:1350]  Out: 200 [Urine:660]    Physical Exam:          Constitutional:  Sedated, orally intubated and mechanically ventilated. EENMT:  Sclera clear, pupils equal, oral mucosa moist and orally intubated  Respiratory: clear  Cardiovascular:  RRR with no M,G,R;  Gastrointestinal:  soft; no bowel sounds present  Musculoskeletal:  warm with no cyanosis, no lower extremity edema. [place] site left leg with ace wrap. Sedated with no movements. SKIN:  no jaundice or ecchymosis   Neurologic:  sedated but no gross neuro deficits  Psychiatric:  sedated and unable to assess at this time    CXR:        LINES:  ETT, coleman, swan valerie, arterial line, chest tubes times 2 in epigastric area without air leak. DRIPS:  Ntg, kuldeep,epi    CI:  2.5    Ventilator Settings  Mode FIO2 Rate Tidal Volume Pressure PEEP      60 %    0.5 ml  0 cm H2O         Peak airway pressure: 24 cm H2O   Minute ventilation: 9.32 l/min     ABG:   Recent Labs     12/04/20  1256 12/04/20  1204 12/04/20  1112   PHI 7.36 7.36 7.27*   PCO2I 33.1* 36.5 46.5*   PO2I 110* 399* 276*   HCO3I 18.5* 20.4* 21.6*        LAB  Recent Labs     12/04/20  1258 12/04/20  0357 12/03/20  0330 12/02/20  0404   WBC 14.9* 6.1 5.8 6.1   HGB 11.5* 9.9* 10.4* 10.5*   HCT 35.2* 30.6* 31.6* 31.5*    189 182 167   INR  --   --  1.2  --      Recent Labs     12/04/20  0357 12/03/20  0330 12/02/20  0404   * 138 138   K 3.2* 3.5 3.5    106 106   CO2 24 24 25   * 131* 128*   BUN 18 18 15   CREA 0.92 0.94 1.04*   MG 1.6* 1.5* 1.8   CA 9.0 9.3 9.3   ALB  --  3.3  --      No results for input(s): LCAD, LAC in the last 72 hours.     Assessment and Plan :  (Medical Decision Making)     Hospital Problems  Date Reviewed: 12/4/2020 Codes Class Noted POA    * (Principal) NSTEMI (non-ST elevated myocardial infarction) ([facility] [address] ICD-10-CM: I21.4  ICD-9-CM: 410.70  11/29/2020 Yes        Hypomagnesemia ICD-10-CM: T92.26  ICD-9-CM: 275.2  11/29/2020 Yes        Syncope and collapse ICD-10-CM: R55  ICD-9-CM: 780.2  11/29/2020 Yes        Nonrheumatic aortic valve stenosis (Chronic) ICD-10-CM: I35.0  ICD-9-CM: 424.1  10/15/2020 Yes    Overview Signed 11/10/2020  9:02 PM by Harvey Barajas MD     Echo (11/6/20):  EF 60-65%. +DD. Mild LAE. Aortic Stenosis:  MG 13.  PG 22.  DI 0.45. Mild MR/TR. Iron deficiency anemia (Chronic) ICD-10-CM: D50.9  ICD-9-CM: 280.9  3/21/2017 Yes    Overview Signed 3/21/2017  3:56 PM by Candi CORRALES      :  Has requires frequent transfusions. No obvious source identified. Coronary atherosclerosis of native coronary vessel (Chronic) ICD-10-CM: I25.10  ICD-9-CM: 414.01  3/21/2017 Yes    Overview Signed 3/21/2017  3:57 PM by Esther Valdez       1. Cardiac  Calcium score (12/28/08) : Total: 218. LAD: 129, Lcx: 29, RCA: 60  2. Lexiscan Cardiolite (3/9/10):  Apical thinning. No ischemia. Normal LV function EF 68%. Hypertension (Chronic) ICD-10-CM: I10  ICD-9-CM: 401.9  Unknown Yes        CKD (chronic kidney disease), stage III (Chronic) ICD-10-CM: N18.30  ICD-9-CM: 585.3  12/10/2013 Yes        Dyslipidemia (Chronic) ICD-10-CM: E78.5  ICD-9-CM: 272.4  12/10/2013 Yes              Plan:   --Wean mechanical ventilation per protocol. --Bronchodilators per protocol. --Incentive spirometry every hour post extubation. --Review CXR    More than 50% of the time documented was spent in face-to-face contact with the patient and in the care of the patient on the floor/unit where the patient is located. Thank you for this referral.  We appreciate the opportunity to participate in this patient's care. Will follow along with you.     Wiley Campbell MD

## 2020-12-04 NOTE — ROUTINE PROCESS
TRANSFER - OUT REPORT:    Verbal report given to Nina Armenta on patient being transferred to Pre-Op for routine progression of care. Information from the following report(s): SBAR, Kardex, Procedure Summary, Intake/Output, MAR, Accordion and Recent Results was reviewed with the receiving nurse. Medications to be given were clarified with receiving nurse. Nozin, Amiodarone, Protonix, and Aspirin were given per protocol. Pt placed on O2 @ 2L via NC for transport. The blood consent, extra labels, etc are at the front of pt's chart. Hibiclens shower was taken last night and again this morning. Allevyn was placed on pt's sacrum for prophylactic purposes. Opportunity for questions and clarification was provided. Pt transported to pre-op.

## 2020-12-04 NOTE — PROGRESS NOTES
CM continues to follow for discharge planning and/or CM needs. Pt currently intubated s/p CV surgery. Discharge planning pending clinical progress. No CM needs at this time. Will continue to monitor and update as needed.

## 2020-12-04 NOTE — BRIEF OP NOTE
Brief Postoperative Note    Patient: Patricia Wilkins  YOB: 1945  MRN: [id number]    Date of Procedure: 12/4/2020     Pre-Op Diagnosis: Atherosclerosis of native coronary artery of native heart with unstable angina pectoris ([address] [I25.110]    Post-Op Diagnosis: Same as preoperative diagnosis. Procedure(s):  CORONARY ARTERY BYPASS GRAFT (CABG X3) LIMA / CLIPPING OF LEFT ATRIAL APPENDAGE  VEIN HARVEST ENDOSCOPIC    Surgeon(s): Lindsey Padilla MD    Surgical Assistant: None    Anesthesia: General     Estimated Blood Loss (mL): Minimal    Complications: None    Specimens: * No specimens in log *     Implants:   Implant Name Type Inv.  Item Serial No.  Lot No. LRB No. Used Action   CLIP ATRIAL SYS EXCL 40MM --  - [id number]  CLIP ATRIAL SYS EXCL 40MM --   [employer] [id number] N/A 1 Implanted       Drains:   Orogastric Tube 12/04/20 (Active)   Site Assessment Clean, dry, & intact 12/04/20 1238   Securement Device Tape 12/04/20 1238   G Port Status Intermittent Suction 12/04/20 1238   Action Taken Placement verified (comment) 12/04/20 1238       Findings: cad    Electronically Signed by Lilly Duane, MD on 12/4/2020 at 12:58 PM

## 2020-12-04 NOTE — ANESTHESIA POSTPROCEDURE EVALUATION
Procedure(s):  CORONARY ARTERY BYPASS GRAFT (CABG X3) LIMA / CLIPPING OF LEFT ATRIAL APPENDAGE  VEIN HARVEST ENDOSCOPIC. general    Anesthesia Post Evaluation      Multimodal analgesia: multimodal analgesia not used between 6 hours prior to anesthesia start to PACU discharge  Patient location during evaluation: ICU  Patient participation: complete - patient cannot participate  Pain management: adequate  Airway patency: patent  Anesthetic complications: no  Cardiovascular status: acceptable  Respiratory status: ETT  Hydration status: acceptable  Post anesthesia nausea and vomiting:  none      INITIAL Post-op Vital signs:   Vitals Value Taken Time   BP     Temp 37 °C (98.6 °F) 12/4/2020  1:16 PM   Pulse 82 12/4/2020  1:16 PM   Resp 18 12/4/2020  1:16 PM   SpO2 100 % 12/4/2020  1:16 PM   Vitals shown include unvalidated device data.

## 2020-12-04 NOTE — PROGRESS NOTES
Admission skin assessment completed with second RN and reveals the following: skin is warm and dry, incision sites are C/D/I. Sacrum is intact with no breakdown noted. Allevyn removed to assess skin underneath, WNL and was replaced. Collette Ruts

## 2020-12-04 NOTE — ANESTHESIA PROCEDURE NOTES
Central Line Placement    Start time: 12/4/2020 7:46 AM  End time: 12/4/2020 7:59 AM  Performed by: Lanice Gowers, MD  Authorized by: Lanice Gowers, MD     Indications: vascular access, central pressure monitoring and need for vasopressors  Preanesthetic Checklist: patient identified, risks and benefits discussed, anesthesia consent, patient being monitored and timeout performed    Timeout Time: 07:46       Pre-procedure: All elements of maximal sterile barrier technique followed? Yes    2% Chlorhexidine for cutaneous antisepsis, Hand hygiene performed prior to catheter insertion and Ultrasound guidance    Sterile Ultrasound Technique followed?: Yes        Ultrasound Image Stored? Image stored    Procedure:   Prep:  ChloraPrep  Location: internal jugular  Orientation:  Right  Patient position:  Trendelenburg  Catheter type:  Double lumen  Catheter size:  9 Fr  Caudal catheter size: 11.  Number of attempts:  1  Successful placement: Yes      Assessment:   Post-procedure:  Catheter secured and sterile dressing applied  Assessment:  Free fluid flow, blood return through all ports and guidewire removal verified  Insertion:  Uncomplicated  Patient tolerance:  Patient tolerated the procedure well with no immediate complications  Internal Jugular CVC- inserted AFTER induction of general anesthesia and intubation    Right internal jugular CVC: Risks, benefits, alternatives explained and pt agrees to proceed. Pt positioned in Trendelenburg. Sterile prep with chlorhexidine and full body drape. Right internal jugular vein on first stick with real time ultrasound guidance. Veinous cannulation confirmed with manometry and visualization of wire in vein on ultrasound. Easy introducer and SLIC insertion. Sterile tegaderm applied. No complications. CXR to be performed in ICU. Seven step protocol followed. Potential vascular access sites examined with ultrasound and an acceptable, patent site selected as noted above. Needle path and vascular access visualized in real time using ultrasound. As noted above, a confirmatory image is permanently stored in the chart.

## 2020-12-04 NOTE — PROGRESS NOTES
Problem: Falls - Risk of  Goal: *Absence of Falls  Description: Document Albino Messing Fall Risk and appropriate interventions in the flowsheet.   Outcome: Progressing Towards Goal  Note: Fall Risk Interventions:  Mobility Interventions: Bed/chair exit alarm, Patient to call before getting OOB         Medication Interventions: Bed/chair exit alarm, Patient to call before getting OOB    Elimination Interventions: Bed/chair exit alarm, Call light in reach    History of Falls Interventions: Bed/chair exit alarm         Problem: Patient Education: Go to Patient Education Activity  Goal: Patient/Family Education  Outcome: Progressing Towards Goal     Problem: CABG: Day of Surgery (Initiate SCIP measures for post-op care)  Goal: Activity/Safety  Outcome: Progressing Towards Goal  Goal: Consults, if ordered  Outcome: Progressing Towards Goal  Goal: Diagnostic Test/Procedures  Outcome: Progressing Towards Goal  Goal: Nutrition/Diet  Outcome: Progressing Towards Goal  Goal: Medications  Outcome: Progressing Towards Goal  Goal: Respiratory  Outcome: Progressing Towards Goal  Goal: Treatments/Interventions/Procedures  Outcome: Progressing Towards Goal  Goal: Psychosocial  Outcome: Progressing Towards Goal  Goal: *Hemodynamically stable (eg: Cardiac output/index; pulmonary arterial pressures; mixed venous oxygen saturation within set parameters)  Outcome: Progressing Towards Goal  Goal: *Lungs clear or at baseline  Outcome: Progressing Towards Goal  Goal: *Stable cardiac rhythm  Outcome: Progressing Towards Goal  Goal: *Afebrile  Outcome: Progressing Towards Goal  Goal: *Follows simple commands post anesthesia  Outcome: Progressing Towards Goal  Goal: *Orients easily following extubation  Outcome: Progressing Towards Goal  Goal: *Optimal pain control at patient's stated goal  Outcome: Progressing Towards Goal

## 2020-12-04 NOTE — PERIOP NOTES
TRANSFER - IN REPORT:    Verbal report received from [facility] on [employer]  being received from [phone] for routine progression of care      Report consisted of patients Situation, Background, Assessment and   Recommendations(SBAR). Information from the following report(s) SBAR was reviewed with the receiving nurse. Opportunity for questions and clarification was provided. Assessment to be completed upon patients arrival to unit and care to be assumed.

## 2020-12-05 ENCOUNTER — APPOINTMENT (OUTPATIENT)
Dept: GENERAL RADIOLOGY | Age: 75
DRG: 233 | End: 2020-12-05
Attending: THORACIC SURGERY (CARDIOTHORACIC VASCULAR SURGERY)
Payer: MEDICARE

## 2020-12-05 LAB
ANION GAP SERPL CALC-SCNC: 6 MMOL/L (ref 7–16)
BUN SERPL-MCNC: 15 MG/DL (ref 8–23)
CALCIUM SERPL-MCNC: 8.8 MG/DL (ref 8.3–10.4)
CHLORIDE SERPL-SCNC: 113 MMOL/L (ref 98–107)
CO2 SERPL-SCNC: 22 MMOL/L (ref 21–32)
CREAT SERPL-MCNC: 0.96 MG/DL (ref 0.6–1)
ERYTHROCYTE [DISTWIDTH] IN BLOOD BY AUTOMATED COUNT: 15.5 % (ref 11.9–14.6)
GLUCOSE BLD STRIP.AUTO-MCNC: 138 MG/DL (ref 65–100)
GLUCOSE BLD STRIP.AUTO-MCNC: 142 MG/DL (ref 65–100)
GLUCOSE BLD STRIP.AUTO-MCNC: 204 MG/DL (ref 65–100)
GLUCOSE BLD STRIP.AUTO-MCNC: 91 MG/DL (ref 65–100)
GLUCOSE BLD STRIP.AUTO-MCNC: 93 MG/DL (ref 65–100)
GLUCOSE BLD STRIP.AUTO-MCNC: 95 MG/DL (ref 65–100)
GLUCOSE BLD STRIP.AUTO-MCNC: 96 MG/DL (ref 65–100)
GLUCOSE SERPL-MCNC: 90 MG/DL (ref 65–100)
HCT VFR BLD AUTO: 31 % (ref 35.8–46.3)
HGB BLD-MCNC: 10.2 G/DL (ref 11.7–15.4)
MAGNESIUM SERPL-MCNC: 2 MG/DL (ref 1.8–2.4)
MCH RBC QN AUTO: 28.8 PG (ref 26.1–32.9)
MCHC RBC AUTO-ENTMCNC: 32.9 G/DL (ref 31.4–35)
MCV RBC AUTO: 87.6 FL (ref 79.6–97.8)
NRBC # BLD: 0 K/UL (ref 0–0.2)
PLATELET # BLD AUTO: 112 K/UL (ref 150–450)
PMV BLD AUTO: 10.4 FL (ref 9.4–12.3)
POTASSIUM SERPL-SCNC: 4 MMOL/L (ref 3.5–5.1)
RBC # BLD AUTO: 3.54 M/UL (ref 4.05–5.2)
SODIUM SERPL-SCNC: 141 MMOL/L (ref 136–145)
WBC # BLD AUTO: 8.9 K/UL (ref 4.3–11.1)

## 2020-12-05 PROCEDURE — 74011250637 HC RX REV CODE- 250/637: Performed by: NURSE PRACTITIONER

## 2020-12-05 PROCEDURE — 74011000250 HC RX REV CODE- 250: Performed by: THORACIC SURGERY (CARDIOTHORACIC VASCULAR SURGERY)

## 2020-12-05 PROCEDURE — 99232 SBSQ HOSP IP/OBS MODERATE 35: CPT | Performed by: INTERNAL MEDICINE

## 2020-12-05 PROCEDURE — 74011250636 HC RX REV CODE- 250/636: Performed by: THORACIC SURGERY (CARDIOTHORACIC VASCULAR SURGERY)

## 2020-12-05 PROCEDURE — 2709999900 HC NON-CHARGEABLE SUPPLY

## 2020-12-05 PROCEDURE — 97530 THERAPEUTIC ACTIVITIES: CPT

## 2020-12-05 PROCEDURE — 65660000004 HC RM CVT STEPDOWN

## 2020-12-05 PROCEDURE — 74011250637 HC RX REV CODE- 250/637: Performed by: PHYSICIAN ASSISTANT

## 2020-12-05 PROCEDURE — 51798 US URINE CAPACITY MEASURE: CPT

## 2020-12-05 PROCEDURE — 85027 COMPLETE CBC AUTOMATED: CPT

## 2020-12-05 PROCEDURE — 83735 ASSAY OF MAGNESIUM: CPT

## 2020-12-05 PROCEDURE — 93005 ELECTROCARDIOGRAM TRACING: CPT | Performed by: THORACIC SURGERY (CARDIOTHORACIC VASCULAR SURGERY)

## 2020-12-05 PROCEDURE — 97116 GAIT TRAINING THERAPY: CPT

## 2020-12-05 PROCEDURE — 74011250637 HC RX REV CODE- 250/637: Performed by: THORACIC SURGERY (CARDIOTHORACIC VASCULAR SURGERY)

## 2020-12-05 PROCEDURE — 82962 GLUCOSE BLOOD TEST: CPT

## 2020-12-05 PROCEDURE — 97161 PT EVAL LOW COMPLEX 20 MIN: CPT

## 2020-12-05 PROCEDURE — 74011636637 HC RX REV CODE- 636/637: Performed by: THORACIC SURGERY (CARDIOTHORACIC VASCULAR SURGERY)

## 2020-12-05 PROCEDURE — 36600 WITHDRAWAL OF ARTERIAL BLOOD: CPT

## 2020-12-05 PROCEDURE — 77030012390 HC DRN CHST BTL GTNG -B

## 2020-12-05 PROCEDURE — 71045 X-RAY EXAM CHEST 1 VIEW: CPT

## 2020-12-05 PROCEDURE — 77030027138 HC INCENT SPIROMETER -A

## 2020-12-05 PROCEDURE — 80048 BASIC METABOLIC PNL TOTAL CA: CPT

## 2020-12-05 RX ORDER — POTASSIUM CHLORIDE 20 MEQ/1
20 TABLET, EXTENDED RELEASE ORAL
Status: DISCONTINUED | OUTPATIENT
Start: 2020-12-05 | End: 2020-12-10 | Stop reason: HOSPADM

## 2020-12-05 RX ORDER — MUPIROCIN 20 MG/G
OINTMENT TOPICAL 2 TIMES DAILY
Status: DISCONTINUED | OUTPATIENT
Start: 2020-12-05 | End: 2020-12-05

## 2020-12-05 RX ORDER — DOCUSATE SODIUM 100 MG/1
100 CAPSULE, LIQUID FILLED ORAL DAILY
Status: DISCONTINUED | OUTPATIENT
Start: 2020-12-06 | End: 2020-12-05

## 2020-12-05 RX ORDER — INSULIN LISPRO 100 [IU]/ML
INJECTION, SOLUTION INTRAVENOUS; SUBCUTANEOUS
Status: DISCONTINUED | OUTPATIENT
Start: 2020-12-05 | End: 2020-12-06

## 2020-12-05 RX ORDER — FUROSEMIDE 40 MG/1
40 TABLET ORAL DAILY
Status: DISCONTINUED | OUTPATIENT
Start: 2020-12-06 | End: 2020-12-08

## 2020-12-05 RX ORDER — OXYCODONE AND ACETAMINOPHEN 5; 325 MG/1; MG/1
1 TABLET ORAL
Status: DISCONTINUED | OUTPATIENT
Start: 2020-12-05 | End: 2020-12-10 | Stop reason: HOSPADM

## 2020-12-05 RX ORDER — ZOLPIDEM TARTRATE 5 MG/1
5 TABLET ORAL
Status: DISCONTINUED | OUTPATIENT
Start: 2020-12-05 | End: 2020-12-10 | Stop reason: HOSPADM

## 2020-12-05 RX ORDER — ONDANSETRON 2 MG/ML
4 INJECTION INTRAMUSCULAR; INTRAVENOUS
Status: DISCONTINUED | OUTPATIENT
Start: 2020-12-05 | End: 2020-12-10 | Stop reason: HOSPADM

## 2020-12-05 RX ORDER — LANOLIN ALCOHOL/MO/W.PET/CERES
400 CREAM (GRAM) TOPICAL
Status: DISCONTINUED | OUTPATIENT
Start: 2020-12-05 | End: 2020-12-10 | Stop reason: HOSPADM

## 2020-12-05 RX ORDER — TRAMADOL HYDROCHLORIDE 50 MG/1
50 TABLET ORAL
Status: DISCONTINUED | OUTPATIENT
Start: 2020-12-05 | End: 2020-12-10 | Stop reason: HOSPADM

## 2020-12-05 RX ORDER — AMIODARONE HYDROCHLORIDE 200 MG/1
200 TABLET ORAL 2 TIMES DAILY
Status: DISCONTINUED | OUTPATIENT
Start: 2020-12-05 | End: 2020-12-10 | Stop reason: HOSPADM

## 2020-12-05 RX ORDER — POTASSIUM CHLORIDE 750 MG/1
10 TABLET, EXTENDED RELEASE ORAL DAILY
Status: DISCONTINUED | OUTPATIENT
Start: 2020-12-06 | End: 2020-12-08

## 2020-12-05 RX ORDER — POTASSIUM CHLORIDE 20 MEQ/1
40 TABLET, EXTENDED RELEASE ORAL
Status: DISCONTINUED | OUTPATIENT
Start: 2020-12-05 | End: 2020-12-10 | Stop reason: HOSPADM

## 2020-12-05 RX ORDER — ASPIRIN 81 MG/1
81 TABLET ORAL DAILY
Status: DISCONTINUED | OUTPATIENT
Start: 2020-12-06 | End: 2020-12-10 | Stop reason: HOSPADM

## 2020-12-05 RX ORDER — SODIUM CHLORIDE 0.9 % (FLUSH) 0.9 %
5-40 SYRINGE (ML) INJECTION AS NEEDED
Status: DISCONTINUED | OUTPATIENT
Start: 2020-12-05 | End: 2020-12-10 | Stop reason: HOSPADM

## 2020-12-05 RX ORDER — SODIUM CHLORIDE 0.9 % (FLUSH) 0.9 %
5-40 SYRINGE (ML) INJECTION EVERY 8 HOURS
Status: DISCONTINUED | OUTPATIENT
Start: 2020-12-05 | End: 2020-12-10 | Stop reason: HOSPADM

## 2020-12-05 RX ORDER — AMOXICILLIN 250 MG
2 CAPSULE ORAL EVERY 12 HOURS
Status: DISCONTINUED | OUTPATIENT
Start: 2020-12-05 | End: 2020-12-07

## 2020-12-05 RX ORDER — MAG HYDROX/ALUMINUM HYD/SIMETH 200-200-20
30 SUSPENSION, ORAL (FINAL DOSE FORM) ORAL
Status: DISCONTINUED | OUTPATIENT
Start: 2020-12-05 | End: 2020-12-10 | Stop reason: HOSPADM

## 2020-12-05 RX ORDER — ACETAMINOPHEN 325 MG/1
650 TABLET ORAL
Status: DISCONTINUED | OUTPATIENT
Start: 2020-12-05 | End: 2020-12-10 | Stop reason: HOSPADM

## 2020-12-05 RX ADMIN — AMIODARONE HYDROCHLORIDE 200 MG: 200 TABLET ORAL at 17:58

## 2020-12-05 RX ADMIN — CARVEDILOL 3.12 MG: 3.12 TABLET, FILM COATED ORAL at 07:28

## 2020-12-05 RX ADMIN — ROSUVASTATIN CALCIUM 20 MG: 20 TABLET, COATED ORAL at 20:49

## 2020-12-05 RX ADMIN — AMIODARONE HYDROCHLORIDE 200 MG: 200 TABLET ORAL at 08:08

## 2020-12-05 RX ADMIN — Medication 10 ML: at 20:52

## 2020-12-05 RX ADMIN — OXYCODONE HYDROCHLORIDE AND ACETAMINOPHEN 1 TABLET: 5; 325 TABLET ORAL at 12:14

## 2020-12-05 RX ADMIN — Medication 10 ML: at 14:42

## 2020-12-05 RX ADMIN — INSULIN LISPRO 4 UNITS: 100 INJECTION, SOLUTION INTRAVENOUS; SUBCUTANEOUS at 21:54

## 2020-12-05 RX ADMIN — SENNOSIDES AND DOCUSATE SODIUM 2 TABLET: 8.6; 5 TABLET ORAL at 14:39

## 2020-12-05 RX ADMIN — PANTOPRAZOLE SODIUM 40 MG: 40 TABLET, DELAYED RELEASE ORAL at 07:28

## 2020-12-05 RX ADMIN — MAGNESIUM SULFATE HEPTAHYDRATE 1 G: 1 INJECTION, SOLUTION INTRAVENOUS at 07:28

## 2020-12-05 RX ADMIN — Medication 1 AMPULE: at 14:40

## 2020-12-05 RX ADMIN — Medication 10 ML: at 05:14

## 2020-12-05 RX ADMIN — POTASSIUM CHLORIDE 10 MEQ: 14.9 INJECTION, SOLUTION INTRAVENOUS at 08:05

## 2020-12-05 RX ADMIN — ASPIRIN 81 MG CHEWABLE TABLET 81 MG: 81 TABLET CHEWABLE at 08:08

## 2020-12-05 RX ADMIN — Medication 1 AMPULE: at 08:08

## 2020-12-05 RX ADMIN — OXYCODONE HYDROCHLORIDE AND ACETAMINOPHEN 1 TABLET: 5; 325 TABLET ORAL at 06:03

## 2020-12-05 RX ADMIN — TRAMADOL HYDROCHLORIDE 50 MG: 50 TABLET ORAL at 19:38

## 2020-12-05 RX ADMIN — TRAMADOL HYDROCHLORIDE 50 MG: 50 TABLET ORAL at 07:46

## 2020-12-05 RX ADMIN — OXYCODONE HYDROCHLORIDE AND ACETAMINOPHEN 1 TABLET: 5; 325 TABLET ORAL at 01:57

## 2020-12-05 RX ADMIN — CEFAZOLIN SODIUM 2 G: 100 INJECTION, POWDER, LYOPHILIZED, FOR SOLUTION INTRAVENOUS at 04:06

## 2020-12-05 RX ADMIN — TRAMADOL HYDROCHLORIDE 50 MG: 50 TABLET ORAL at 12:59

## 2020-12-05 RX ADMIN — OXYCODONE AND ACETAMINOPHEN 1 TABLET: 5; 325 TABLET ORAL at 16:12

## 2020-12-05 RX ADMIN — ONDANSETRON 4 MG: 2 INJECTION INTRAMUSCULAR; INTRAVENOUS at 22:15

## 2020-12-05 RX ADMIN — ACETAMINOPHEN 650 MG: 325 TABLET, FILM COATED ORAL at 14:38

## 2020-12-05 RX ADMIN — Medication 1 AMPULE: at 20:49

## 2020-12-05 RX ADMIN — CARVEDILOL 3.12 MG: 3.12 TABLET, FILM COATED ORAL at 17:58

## 2020-12-05 NOTE — PROGRESS NOTES
Good visit with the patient   she is calm and alert   sitting in the chair    thankful for prayer and the visit

## 2020-12-05 NOTE — PROGRESS NOTES
Respiratory Mechanics completed and are as follows:  Weaning Parameters  Spontaneous Breathing Trial Complete: Yes  Resp Rate Observed: 12  Ve: 8.1  VT: 640  RSBI: 18.8  Daniels Agitation Sedation Scale (RASS): Light sedation  Patient extubated to  Airvo 40 l/m  40 %. Patient is able to communicate and is negative for stridor. Breath sounds are coarse. No complications with extubation.      Julius Doe, RT

## 2020-12-05 NOTE — PROGRESS NOTES
Chest tubes with no air leak noted with breathing or coughing. Pt ambulatory in hallway 200 feet prior to chest tube removal with minimal output after.     Chest tube removed per orders during maximum inspiration. Sutures triple tied. Petroleum gauze placed over site and bandaged with 4x4 and tape. Pacer wires isolated and taped. Left pleural left in place per MD order.     SWAN introducer cath removed with cath tip intact. Pressure held until hemostasis achieved. Site bandaged with gauze, antibiotic ointment, and tegaderm.     Pt remains resting in NAD, no s/sx SOB, SaO2 98% on supplemental O2 at 2lnc, breath sounds CTA bilaterally and diminished in bases as prior, no subcutaneous emphysema noted.  Will continue to monitor.

## 2020-12-05 NOTE — PROGRESS NOTES
Patient has not voided bladder scan performed only 56 mls of urine noted.  Encouraged to drink more fluids

## 2020-12-05 NOTE — PROGRESS NOTES
Problem: Mobility Impaired (Adult and Pediatric)  Goal: *Acute Goals and Plan of Care (Insert Text)  Outcome: Progressing Towards Goal  Note: LTG:  (1.)Ms. Zepeda will move from supine to sit and sit to supine , scoot up and down, and roll side to side with INDEPENDENT within 7 treatment day(s). (2.)Ms. Zepeda will transfer from bed to chair and chair to bed with INDEPENDENT using the least restrictive device within 7 treatment day(s). (3.)Ms. Zepeda will ambulate with SUPERVISION for 500+ feet with the least restrictive device within 7 treatment day(s) while maintaining normal vital signs. (4.)Ms. Zepeda will perform 1-2 steps with CGA within 7 treatment days for safety ascending and descending stairs for home.   _________________________________________________________________________________________      PHYSICAL THERAPY: Daily Note and PM 12/5/2020  INPATIENT: PT Visit Days : 1  [place] / [place] / Product Type: Medicare /       NAME/AGE/GENDER: Eugenio Roman is a 67 y.o. female   PRIMARY DIAGNOSIS: Chest pain [R07.9] NSTEMI (non-ST elevated myocardial infarction) ([facility] [address] NSTEMI (non-ST elevated myocardial infarction) ([facility] [address]  Procedure(s) (LRB):  CORONARY ARTERY BYPASS GRAFT (CABG X3) LIMA / CLIPPING OF LEFT ATRIAL APPENDAGE (N/A)  VEIN HARVEST ENDOSCOPIC (Left)  1 Day Post-Op  ICD-10: Treatment Diagnosis:    · Difficulty in walking, Not elsewhere classified (R26.2)   Precaution/Allergies:  Toprol xl [metoprolol succinate]      ASSESSMENT:     Ms. Adair Shane presents with decreased bed mobility, transfers, ambulation, balance, activity tolerance, and overall general functional mobility s/p hospital admission with CABG x 3 on 12/4/20. Pt history significant for multiple myeloma. Pt A & O x 3 this date, 2 L O2 via n.c, chest tube, coleman. Pt reports lives with spouse in 1 story home, 1 step to enter. Pt states independent at baseline, drives, no falls.  Pt placed on RA for mobility. Pt MIN A scooting edge of chair, sit to stand transfers. Pt with fair static and dynamic standing balance with RW. Pt with MIN A for ambulation 100', cues for posture, pursed lip breathing, walker safety, trixie. PT to cont to follow for acute care needs to address deficits noted above. Discharge needs pending progress with mobility. Pm note: Pt in chair on step down unit, daughter present. Pt on RA, states chest pain 6/10, RN aware. Pt still with chest tube. Pt MIN A transfers, MIN A ambulation with RW. Pt with slow trixie, cues for walker safety, posture, pursed lip breathing. Pt required additional time for ambulation, O2 stats >90% with activity. Pt required MIN A for toilet transfer, demonstrated good static and dynamic sitting for toileting needs. Pt overall making slow progress with mobility and toward goals. PT discussed home needs with patient and daughter ie shower chair/bench, shower transfers. Pt will need BSC and possible RW for home with discharge. This section established at most recent assessment   PROBLEM LIST (Impairments causing functional limitations):  1. Decreased ADL/Functional Activities  2. Decreased Transfer Abilities  3. Decreased Ambulation Ability/Technique  4. Decreased Balance  5. Decreased Activity Tolerance  6. Increased Fatigue  7. Decreased [place] with Home Exercise Program   INTERVENTIONS PLANNED: (Benefits and precautions of physical therapy have been discussed with the patient.)  1. Balance Exercise  2. Bed Mobility  3. Family Education  4. Gait Training  5. Home Exercise Program (HEP)  6. Therapeutic Activites  7. Therapeutic Exercise/Strengthening  8. Transfer Training     TREATMENT PLAN: Frequency/Duration: twice daily for duration of hospital stay  Rehabilitation Potential For Stated Goals: Good     REHAB RECOMMENDATIONS (at time of discharge pending progress):    Placement:   It is my opinion, based on this patient's performance to date, that Ms. Iveth Wilkins may benefit from participating in 1-2 additional therapy sessions in order to continue to assess for rehab potential and then make recommendation for disposition at discharge. Equipment:    To be determined-BSC and possible RW              HISTORY:   History of Present Injury/Illness (Reason for Referral):  67year old known female patient with CAD and NSTEMI with plans for intervention by Dr. Gabo Hester, chronic TAYLOR with history of small bowel AVMs. and smoldering multiple myeloma who we are following for anemia and bleeding. Hgb 7.2 on 12/1 despite PRBCs. Has 2 more units scheduled. Plan for EGD with push enteroscopy today prior to CABG. Cardiology clearance obtained by Dr. Gabo Hester. Past Medical History/Comorbidities:   Ms. Iveth Wilkins  has a past medical history of Anemia, Mcneil's esophagus, Coronary atherosclerosis of native coronary vessel, GERD (gastroesophageal reflux disease), HLD (hyperlipidemia), Hypercholesterolemia, Hypertension, Mild aortic stenosis, Occlusion and stenosis of carotid artery without mention of cerebral infarction, Osteoarthritis, PAD (peripheral artery disease) ([facility] [address] PONV (postoperative nausea and vomiting), S/P insertion of iliac artery stent, and Shingles. Ms. Iveth Wilkins  has a past surgical history that includes hx angioplasty; hx endoscopy; hx colonoscopy; hx appendectomy; hx tonsillectomy; hx orthopaedic; hx hysterectomy; vascular surgery procedure unlist (Left, 2006); hx carotid endarterectomy (Left, 03/30/2017); and hx carotid endarterectomy (Right, 05/15/2017).   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 Overall Behavior:  agreeable   Number of Personal Factors/Comorbidities that affect the Plan of Care: Anemia, age, CAD 3+: HIGH COMPLEXITY   EXAMINATION:   Most Recent Physical Functioning:   Gross Assessment:  AROM: Within functional limits(B LE)  Strength: Generally decreased, functional(B LE)  Coordination: Generally decreased, functional               Posture:  Posture (WDL): Exceptions to WDL  Posture Assessment: Rounded shoulders  Balance:  Sitting: Intact  Sitting - Static: Good (unsupported)  Sitting - Dynamic: Good (unsupported)  Standing: Impaired  Standing - Static: Good;Fair  Standing - Dynamic : Fair Bed Mobility:  Supine to Sit: (up in chair)  Sit to Supine: (left up in chair)  Scooting: Minimum assistance  Wheelchair Mobility:     Transfers:  Sit to Stand: Minimum assistance  Stand to Sit: Minimum assistance  Gait:     Base of Support: Narrowed  Speed/Sarika: Slow  Step Length: Left shortened;Right shortened  Swing Pattern: Left symmetrical;Right symmetrical  Gait Abnormalities: Decreased step clearance  Distance (ft): 150 Feet (ft)  Assistive Device: Walker, rolling  Ambulation - Level of Assistance: Contact guard assistance;Minimal assistance  Interventions: Tactile cues; Verbal cues      Body Structures Involved:  1. Heart  2. Lungs  3. Muscles Body Functions Affected:  1. Cardio  2. Respiratory  3. Movement Related Activities and Participation Affected:  1. General Tasks and Demands  2. Mobility  3. Self Care   Number of elements that affect the Plan of Care: 4+: HIGH COMPLEXITY   CLINICAL PRESENTATION:   Presentation: Evolving clinical presentation with changing clinical characteristics: MODERATE COMPLEXITY   CLINICAL DECISION MAKIN [facility] Mobility Inpatient Short Form  How much difficulty does the patient currently have. ..  Unable A Lot A Little None   1. Turning over in bed (including adjusting bedclothes, sheets and blankets)? [] 1   [x] 2   [] 3   [] 4   2. Sitting down on and standing up from a chair with arms ( e.g., wheelchair, bedside commode, etc.)   [] 1   [x] 2   [] 3   [] 4   3. Moving from lying on back to sitting on the side of the bed? [] 1   [x] 2   [] 3   [] 4   How much help from another person does the patient currently need. .. Total A Lot A Little None   4. Moving to and from a bed to a chair (including a wheelchair)? [] 1   [] 2   [x] 3   [] 4   5. Need to walk in hospital room? [] 1   [] 2   [x] 3   [] 4   6. Climbing 3-5 steps with a railing? [] 1   [x] 2   [] 3   [] 4   © 2007, Trustees of [facility], under license to [employer]. All rights reserved      Score:  Initial: 14 Most Recent: X (Date: -- )    Interpretation of Tool:  Represents activities that are increasingly more difficult (i.e. Bed mobility, Transfers, Gait). Medical Necessity:     · Patient is expected to demonstrate progress in   · strength, range of motion, balance, and coordination  ·  to   · decrease assistance required with overall functional mobility, transfers, ambulation  · .  · Patient demonstrates   · good  ·  rehab potential due to higher previous functional level. Reason for Services/Other Comments:  · Patient   · continues to require present interventions due to patient's inability to perform bed mobility, transfers, ambulation  · . Use of outcome tool(s) and clinical judgement create a POC that gives a: Clear prediction of patient's progress: LOW COMPLEXITY            TREATMENT:   (In addition to Assessment/Re-Assessment sessions the following treatments were rendered)   Pre-treatment Symptoms/Complaints:  \"I am ready\"  Pain: Initial:   Pain Intensity 1: 6  Pain Location 1: Chest  Post Session:  6/10 post session     Therapeutic Activity: (    45 min):   Therapeutic activities including Chair transfers, Toilet transfers, Ambulation on level ground and weight shifting, walker safety, posture, pursed lip breathing to improve mobility, strength, balance and coordination. Required minimal Tactile cues; Verbal cues to promote static and dynamic balance in standing and promote coordination of bilateral, lower extremity(s). Braces/Orthotics/Lines/Etc:   · Chest tube  · O2 Device: Nasal cannula  Treatment/Session Assessment:    · Response to Treatment:  tolerated well  · Interdisciplinary Collaboration:   o Physical Therapist  o Registered Nurse  · After treatment position/precautions:   o Up in chair  o Bed alarm/tab alert on  o Bed/Chair-wheels locked  o Bed in low position  o Call light within reach  o RN notified  o Family at bedside   · Compliance with Program/Exercises: Will assess as treatment progresses  · Recommendations/Intent for next treatment session: \"Next visit will focus on advancements to more challenging activities\".   Total Treatment Duration:  PT Patient Time In/Time Out  Time In: 1443  Time Out: [address] [facility], PT

## 2020-12-05 NOTE — PROGRESS NOTES
TRANSFER - OUT REPORT:    Verbal report given to AGUSTO Ken on [employer]  being transferred to [facility] for routine progression of care       Report consisted of patients Situation, Background, Assessment and Recommendations(SBAR). Information from the following report(s) SBAR, Kardex, OR Summary, Procedure Summary, Intake/Output, Recent Results and Cardiac Rhythm NSR was reviewed with the receiving nurse. Opportunity for questions and clarification was provided.

## 2020-12-05 NOTE — PROGRESS NOTES
Adal Zepeda  Admission Date: 11/29/2020             Daily Progress Note: 12/5/2020    The patient's chart is reviewed and the patient is discussed with the staff. This is a 65-year-old female with known history of peripheral vascular disease and carotid artery disease admitted 11/29 after a syncopal episode. Anibal Yuan has had exertional chest pain concerning for angina several months.  She states she awoke early in the morning and was going to her bathroom when she had a syncopal event.  Felt \"dizziness\" prior to the event but no tachycardia or chest discomfort. Anibal Yuan has been dealing with significant anemia and has required iron and packed red blood cell transfusion  She was transferred to [facility]. Troponin continued to rise consistent with NSTEMI. She underwent cardiac catheterization today that showed high grade stenosis in the LCx and RCA. LV gram showed mild to moderately reduced LV function with inferolateral hypokinesis. There was no significant gradient across the AV. She has \"smoldering\" multiple myeloma as well as TAYLOR. Patient is seen at the request of Dr. Kendra Guevara for respiratory management status post cardiac surgery. Patient had cabg x 3.   Currently is sedated in CV-ICU and orally intubated receiving  mechanical ventilation.     We have been asked to see in the CV-ICU for mechanical ventilation     Subjective:     She is extubated  On NC 2 LPM    Current Facility-Administered Medications   Medication Dose Route Frequency    0.9% sodium chloride infusion  25 mL/hr IntraVENous CONTINUOUS    dextrose 5% - 0.45% NaCl with KCl 20 mEq/L infusion  25 mL/hr IntraVENous CONTINUOUS    sodium chloride (NS) flush 5-40 mL  5-40 mL IntraVENous Q8H    sodium chloride (NS) flush 5-40 mL  5-40 mL IntraVENous PRN    oxyCODONE-acetaminophen (PERCOCET) 5-325 mg per tablet 1 Tab  1 Tab Oral Q4H PRN    morphine 10 mg/ml injection 3-5 mg  3-5 mg IntraVENous Q1H PRN    naloxone (NARCAN) injection 0.4 mg  0.4 mg IntraVENous PRN    sodium bicarbonate (8.4%) injection 50 mEq  50 mEq IntraVENous PRN    EPINEPHrine (ADRENALIN) 4 mg in 0.9% sodium chloride 250 mL infusion  0.01-0.5 mcg/kg/min IntraVENous TITRATE    nitroglycerin (Tridil) 200 mcg/ml infusion   mcg/min IntraVENous TITRATE    PHENYLephrine (HAYDEN-SYNEPHRINE) 30 mg in 0.9% sodium chloride 250 mL infusion   mcg/min IntraVENous TITRATE    lidocaine (PF) (XYLOCAINE) 100 mg/5 mL (2 %) injection syringe  mg   mg IntraVENous ONCE PRN    niCARdipine in Saline (CARDENE) 25 MG/250 mL infusion kit  5-15 mg/hr IntraVENous TITRATE    amiodarone (CORDARONE) tablet 200 mg  200 mg Oral Q12H    insulin regular (NOVOLIN R, HUMULIN R) 100 Units in 0.9% sodium chloride 100 mL infusion  1 Units/hr IntraVENous TITRATE    dextrose (D50W) injection syrg 12.5 g  25 mL IntraVENous PRN    aspirin chewable tablet 81 mg  81 mg Oral DAILY    magnesium sulfate 1 g/100 ml IVPB (premix or compounded)  1 g IntraVENous PRN    potassium chloride 10 mEq in 50 ml IVPB  10 mEq IntraVENous PRN    midazolam (VERSED) injection 1 mg  1 mg IntraVENous Q1H PRN    meperidine (DEMEROL) injection 25 mg  25 mg IntraVENous Q1H PRN    tuberculin injection 5 Units  5 Units IntraDERMal ONCE    carvediloL (COREG) tablet 3.125 mg  3.125 mg Oral BID WITH MEALS    albumin human 5% (BUMINATE) solution 25 g  25 g IntraVENous PRN    alcohol 62% (NOZIN) nasal  1 Ampule  1 Ampule Topical Q12H    influenza vaccine 2020-21 (6 mos+)(PF) (FLUARIX/FLULAVAL/FLUZONE QUAD) injection 0.5 mL  0.5 mL IntraMUSCular PRIOR TO DISCHARGE    rosuvastatin (CRESTOR) tablet 20 mg  20 mg Oral QHS    lip protectant (BLISTEX) ointment 1 Each  1 Each Topical PRN    acetaminophen (TYLENOL) tablet 325 mg  325 mg Oral Q6H PRN    pantoprazole (PROTONIX) tablet 40 mg  40 mg Oral ACB    nitroglycerin (NITROSTAT) tablet 0.4 mg  0.4 mg SubLINGual Q5MIN PRN    naloxone (NARCAN) injection 0.4 mg  0.4 mg IntraVENous PRN    ondansetron (ZOFRAN) injection 4 mg  4 mg IntraVENous Q4H PRN    alum-mag hydroxide-simeth (MYLANTA) oral suspension 30 mL  30 mL Oral Q4H PRN    diphenhydrAMINE (BENADRYL) capsule 25 mg  25 mg Oral Q6H PRN    docusate sodium (COLACE) capsule 100 mg  100 mg Oral BID PRN       Review of Systems        Constitutional: negative for fever, chills, sweats  Cardiovascular: negative for chest pain, palpitations, syncope, edema  Gastrointestinal:  negative for dysphagia, reflux, vomiting, diarrhea, abdominal pain, or melena  Neurologic:  negative for focal weakness, numbness, headache    Objective:     Vitals:    12/05/20 0501 12/05/20 0531 12/05/20 0600 12/05/20 0607   BP: (!) 148/62 131/62 (!) 126/58    Pulse: 76 73 71    Resp: 23 16 14    Temp:       SpO2: 93% 94% 94% 94%   Weight:       Height:             Intake/Output Summary (Last 24 hours) at 12/5/2020 0710  Last data filed at 12/5/2020 0664  Gross per 24 hour   Intake 27627.99 ml   Output 1885 ml   Net 8135.99 ml       Physical Exam:   Constitution:  the patient is well developed and in no acute distress  EENMT:  Sclera clear, pupils equal, oral mucosa moist  Respiratory: CTA  Cardiovascular:  RRR without M,G,R  Gastrointestinal: soft and non-tender; with positive bowel sounds. Musculoskeletal: warm without cyanosis. There is no lower extremity edema.   Skin:  no jaundice or rashes, sternal wound is okay  Neurologic: no gross neuro deficits     Psychiatric:  alert and oriented x 3    CXR:         LAB  Recent Labs     12/05/20  0503 12/05/20  0310 12/05/20  0104 12/04/20  2309 12/04/20  2152   GLUCPOC 96 95 91 103* 114*      Recent Labs     12/05/20  0312 12/04/20  2152 12/04/20  1705 12/04/20  1258 12/04/20  0357 12/03/20  0330   WBC 8.9  --   --  14.9* 6.1 5.8   HGB 10.2* 10.6* 11.4* 11.5* 9.9* 10.4*   HCT 31.0* 31.9* 34.9* 35.2* 30.6* 31.6*   *  --   --  150 189 182   INR  --   --   --  1.4  --  1.2     Recent Labs 12/05/20  0312 12/04/20  2152 12/04/20  1705 12/04/20  1258 12/04/20  0357 12/03/20  0330     --   --  143 137* 138   K 4.0 4.1 4.0 3.5 3.2* 3.5   *  --   --  115* 106 106   CO2 22  --   --  22 24 24   GLU 90  --   --  140* 131* 131*   BUN 15  --   --  16 18 18   CREA 0.96  --   --  1.21* 0.92 0.94   MG 2.0 2.2 2.3 2.5* 1.6* 1.5*   CA 8.8  --   --  8.7 9.0 9.3   ALB  --   --   --   --   --  3.3   TBILI  --   --   --   --   --  0.7   ALT  --   --   --   --   --  20     Recent Labs     12/04/20  1957 12/04/20  1256 12/04/20  1204   PHI 7.42 7.36 7.36   PCO2I 27.1* 33.1* 36.5   PO2I 97 110* 399*   HCO3I 17.7* 18.5* 20.4*     No results for input(s): LCAD, LAC in the last 72 hours. Assessment:  (Medical Decision Making)     Hospital Problems  Date Reviewed: 12/4/2020          Codes Class Noted POA    Encounter for weaning from ventilator [facility]) ICD-10-CM: Z99.11  ICD-9-CM: V46.13  12/4/2020 Unknown        Hypoxia ICD-10-CM: R09.02  ICD-9-CM: 799.02  12/4/2020 Unknown        S/P CABG x 3 ICD-10-CM: Z95.1  ICD-9-CM: V45.81  12/4/2020 Unknown        * (Principal) NSTEMI (non-ST elevated myocardial infarction) ([facility] [address] ICD-10-CM: I21.4  ICD-9-CM: 410.70  11/29/2020 Yes        Hypomagnesemia ICD-10-CM: E83.42  ICD-9-CM: 275.2  11/29/2020 Yes        Syncope and collapse ICD-10-CM: R55  ICD-9-CM: 780.2  11/29/2020 Yes        Nonrheumatic aortic valve stenosis (Chronic) ICD-10-CM: I35.0  ICD-9-CM: 424.1  10/15/2020 Yes    Overview Signed 11/10/2020  9:02 PM by Yonas Wolff MD     Echo (11/6/20):  EF 60-65%. +DD. Mild LAE. Aortic Stenosis:  MG 13.  PG 22.  DI 0.45. Mild MR/TR. Iron deficiency anemia (Chronic) ICD-10-CM: D50.9  ICD-9-CM: 280.9  3/21/2017 Yes    Overview Signed 3/21/2017  3:56 PM by Temple Nyhan A      :  Has requires frequent transfusions. No obvious source identified.                Coronary atherosclerosis of native coronary vessel (Chronic) ICD-10-CM: I25.10  ICD-9-CM: 414.01  3/21/2017 Yes    Overview Signed 3/21/2017  3:57 PM by Maryl Barthel A       1. Cardiac  Calcium score (12/28/08) : Total: 218. LAD: 129, Lcx: 29, RCA: 60  2. Lexiscan Cardiolite (3/9/10):  Apical thinning. No ischemia. Normal LV function EF 68%. Hypertension (Chronic) ICD-10-CM: I10  ICD-9-CM: 401.9  Unknown Yes        CKD (chronic kidney disease), stage III (Chronic) ICD-10-CM: N18.30  ICD-9-CM: 585.3  12/10/2013 Yes        Dyslipidemia (Chronic) ICD-10-CM: E78.5  ICD-9-CM: 272.4  12/10/2013 Yes              Plan:  (Medical Decision Making)     --Incentive spirometry  --Wean oxygen as tolerated  --Keep chest tube in place for now  --Surgery protocol    More than 50% of the time documented was spent in face-to-face contact with the patient and in the care of the patient on the floor/unit where the patient is located.     Valeria Diaz MD

## 2020-12-05 NOTE — PROGRESS NOTES
Bedside verbal shift change report received from/given to Huong duran RN . Report included the following information SBAR, Kardex, Procedure Summary, Intake/Output, MAR, Recent Results and Cardiac Rhythm NSR. Current drips verified and hemodynamics reviewed.

## 2020-12-05 NOTE — PROGRESS NOTES
Problem: Mobility Impaired (Adult and Pediatric)  Goal: *Acute Goals and Plan of Care (Insert Text)  Outcome: Progressing Towards Goal  Note: LTG:  (1.)Ms. Zepeda will move from supine to sit and sit to supine , scoot up and down, and roll side to side with INDEPENDENT within 7 treatment day(s). (2.)Ms. Zepeda will transfer from bed to chair and chair to bed with INDEPENDENT using the least restrictive device within 7 treatment day(s). (3.)Ms. Zepeda will ambulate with SUPERVISION for 500+ feet with the least restrictive device within 7 treatment day(s) while maintaining normal vital signs. (4.)Ms. Zepeda will perform 1-2 steps with CGA within 7 treatment days for safety ascending and descending stairs for home.   _________________________________________________________________________________________      PHYSICAL THERAPY: Initial Assessment and AM 12/5/2020  INPATIENT: PT Visit Days : 1  [place] / [place] / Product Type: Medicare /       NAME/AGE/GENDER: Ben Shanks is a 67 y.o. female   PRIMARY DIAGNOSIS: Chest pain [R07.9] NSTEMI (non-ST elevated myocardial infarction) ([facility] [address] NSTEMI (non-ST elevated myocardial infarction) ([facility] [address]  Procedure(s) (LRB):  CORONARY ARTERY BYPASS GRAFT (CABG X3) LIMA / CLIPPING OF LEFT ATRIAL APPENDAGE (N/A)  VEIN HARVEST ENDOSCOPIC (Left)  1 Day Post-Op  ICD-10: Treatment Diagnosis:    Difficulty in walking, Not elsewhere classified (R26.2)   Precaution/Allergies:  Toprol xl [metoprolol succinate]      ASSESSMENT:     Ms. Mango Khan presents with decreased bed mobility, transfers, ambulation, balance, activity tolerance, and overall general functional mobility s/p hospital admission with CABG x 3 on 12/4/20. Pt history significant for multiple myeloma. Pt A & O x 3 this date, 2 L O2 via n.c, chest tube, coleman. Pt reports lives with spouse in 1 story home, 1 step to enter. Pt states independent at baseline, drives, no falls.  Pt placed on RA for mobility. Pt MIN A scooting edge of chair, sit to stand transfers. Pt with fair static and dynamic standing balance with RW. Pt with MIN A for ambulation 100', cues for posture, pursed lip breathing, walker safety, trixie. PT to cont to follow for acute care needs to address deficits noted above. Discharge needs pending progress with mobility. This section established at most recent assessment   PROBLEM LIST (Impairments causing functional limitations):  Decreased ADL/Functional Activities  Decreased Transfer Abilities  Decreased Ambulation Ability/Technique  Decreased Balance  Decreased Activity Tolerance  Increased Fatigue  Decreased [place] with Home Exercise Program   INTERVENTIONS PLANNED: (Benefits and precautions of physical therapy have been discussed with the patient.)  Balance Exercise  Bed Mobility  Family Education  Gait Training  Home Exercise Program (HEP)  Therapeutic Activites  Therapeutic Exercise/Strengthening  Transfer Training     TREATMENT PLAN: Frequency/Duration: twice daily for duration of hospital stay  Rehabilitation Potential For Stated Goals: Good     REHAB RECOMMENDATIONS (at time of discharge pending progress):    Placement: It is my opinion, based on this patient's performance to date, that Ms. Zepeda may benefit from participating in 1-2 additional therapy sessions in order to continue to assess for rehab potential and then make recommendation for disposition at discharge. Equipment: To be determined              HISTORY:   History of Present Injury/Illness (Reason for Referral):  67year old known female patient with CAD and NSTEMI with plans for intervention by Dr. Sin Bland, chronic TAYLOR with history of small bowel AVMs. and smoldering multiple myeloma who we are following for anemia and bleeding. Hgb 7.2 on 12/1 despite PRBCs. Has 2 more units scheduled. Plan for EGD with push enteroscopy today prior to CABG. Cardiology clearance obtained by Dr. Sin Bland. Past Medical History/Comorbidities:   Ms. Terese Foster  has a past medical history of Anemia, Mcneil's esophagus, Coronary atherosclerosis of native coronary vessel, GERD (gastroesophageal reflux disease), HLD (hyperlipidemia), Hypercholesterolemia, Hypertension, Mild aortic stenosis, Occlusion and stenosis of carotid artery without mention of cerebral infarction, Osteoarthritis, PAD (peripheral artery disease) ([facility] [address] PONV (postoperative nausea and vomiting), S/P insertion of iliac artery stent, and Shingles. Ms. Terese Foster  has a past surgical history that includes hx angioplasty; hx endoscopy; hx colonoscopy; hx appendectomy; hx tonsillectomy; hx orthopaedic; hx hysterectomy; vascular surgery procedure unlist (Left, 2006); hx carotid endarterectomy (Left, 03/30/2017); and hx carotid endarterectomy (Right, 05/15/2017). 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 Overall Behavior:  agreeable   Number of Personal Factors/Comorbidities that affect the Plan of Care:   Anemia, age, CAD 3+: HIGH COMPLEXITY   EXAMINATION:   Most Recent Physical Functioning:   Gross Assessment:  AROM: Within functional limits(B LE)  Strength: Generally decreased, functional(B LE)  Coordination: Generally decreased, functional               Posture:  Posture (WDL): Exceptions to WDL  Posture Assessment: Rounded shoulders  Balance:  Sitting: Impaired  Sitting - Static: Good (unsupported)  Sitting - Dynamic: Fair (occasional)  Standing: Impaired  Standing - Static: Fair  Standing - Dynamic : Fair Bed Mobility:  Supine to Sit: (in chair)  Sit to Supine: (left up in chair)  Scooting: Minimum assistance  Wheelchair Mobility:     Transfers:  Sit to Stand: Minimum assistance  Stand to Sit: Minimum assistance  Gait:     Base of Support: Narrowed  Speed/Sarika: Slow  Step Length: Left shortened;Right shortened  Swing Pattern: Left symmetrical;Right symmetrical  Gait Abnormalities: Decreased step clearance;Shuffling gait  Distance (ft): 100 Feet (ft)  Assistive Device: Walker, rolling  Ambulation - Level of Assistance: Minimal assistance  Interventions: Tactile cues; Verbal cues      Body Structures Involved:  Heart  Lungs  Muscles Body Functions Affected:  Cardio  Respiratory  Movement Related Activities and Participation Affected:  General Tasks and Demands  Mobility  Self Care   Number of elements that affect the Plan of Care: 4+: HIGH COMPLEXITY   CLINICAL PRESENTATION:   Presentation: Evolving clinical presentation with changing clinical characteristics: MODERATE COMPLEXITY   CLINICAL DECISION MAKIN [facility] Inpatient Short Form  How much difficulty does the patient currently have. .. Unable A Lot A Little None   1. Turning over in bed (including adjusting bedclothes, sheets and blankets)? [] 1   [x] 2   [] 3   [] 4   2. Sitting down on and standing up from a chair with arms ( e.g., wheelchair, bedside commode, etc.)   [] 1   [x] 2   [] 3   [] 4   3. Moving from lying on back to sitting on the side of the bed? [] 1   [x] 2   [] 3   [] 4   How much help from another person does the patient currently need. .. Total A Lot A Little None   4. Moving to and from a bed to a chair (including a wheelchair)? [] 1   [] 2   [x] 3   [] 4   5. Need to walk in hospital room? [] 1   [] 2   [x] 3   [] 4   6. Climbing 3-5 steps with a railing?    [] 1   [x] 2   [] 3   [] 4   © , Trustees of [address] [place] [address] [address] [id number], under license to [employer]. All rights reserved      Score:  Initial: 14 Most Recent: X (Date: -- )    Interpretation of Tool:  Represents activities that are increasingly more difficult (i.e. Bed mobility, Transfers, Gait). Medical Necessity:     Patient is expected to demonstrate progress in   strength, range of motion, balance, and coordination   to   decrease assistance required with overall functional mobility, transfers, ambulation  . Patient demonstrates   good   rehab potential due to higher previous functional level. Reason for Services/Other Comments:  Patient   continues to require present interventions due to patient's inability to perform bed mobility, transfers, ambulation  . Use of outcome tool(s) and clinical judgement create a POC that gives a: Clear prediction of patient's progress: LOW COMPLEXITY            TREATMENT:   (In addition to Assessment/Re-Assessment sessions the following treatments were rendered)   Pre-treatment Symptoms/Complaints:  \"I am ok\"  Pain: Initial:   Pain Intensity 1: 5  Pain Location 1: Chest  Post Session:  5/10 post session     Gait Training ( 10 min):  Gait training to improve and/or restore physical functioning as related to mobility, strength, balance, and coordination. Ambulated 100 Feet (ft) with Minimal assistance using a Walker, rolling and minimal Tactile cues; Verbal cues related to their stance phase and stride length to promote proper body alignment, promote proper body posture, and promote proper body mechanics. Instruction in performance of walker safety, posture, trixie, breathing technique to correct  overall functional ambulation .     Braces/Orthotics/Lines/Etc:   IV  coleman catheter  Chest tube   O2 Device: Nasal cannula  Treatment/Session Assessment:    Response to Treatment:  tolerated well  Interdisciplinary Collaboration:   Physical Therapist  Registered Nurse  After treatment position/precautions:   Up in chair  Bed alarm/tab alert on  Bed/Chair-wheels locked  Bed in low position  Call light within reach  RN notified   Compliance with Program/Exercises: Will assess as treatment progresses  Recommendations/Intent for next treatment session: \"Next visit will focus on advancements to more challenging activities\".   Total Treatment Duration:  PT Patient Time In/Time Out  Time In: 0920  Time Out: 1401 Ninoska Downing PT

## 2020-12-05 NOTE — PROGRESS NOTES
Progress Note    Patient: Niranjan Cannon MRN: [id number]  SSN: [id number]    YOB: 1945  Age: 67 y.o. Sex: female      Admit Date: 11/29/2020    LOS: 6 days    POD 1 s/p:  1. Endoscopic vein harvest left leg. 2.  Clipping of the left atrial appendage with a 40-mm AtriClip. 3.  Coronary artery bypass grafting x3. Subjective / Interval Events:     No issues. On 2L NC. Objective:     Vitals:    12/05/20 0300 12/05/20 0331 12/05/20 0346 12/05/20 0400   BP: (!) 131/58 132/60  137/64   Pulse: 72 71  70   Resp: 16 13  11   Temp: (!) 96.2 °F (35.7 °C) 97.1 °F (36.2 °C)  (!) 96.2 °F (35.7 °C)   SpO2: 97% 96% 96% 95%   Weight:       Height:            Hemodynamics:  [place] out. Intake and Output:  Current Shift: 12/04 1901 - 12/05 0700  In: 120 [P.O.:120]  Out: 375 [Urine:265]  Chest tube drainage in 24 hrs: 230  Last three shifts: 12/03 0701 - 12/04 1900  In: 9327.3 [I.V.:8342.3]  Out: 1290 [Urine:1170]  Last 3 Recorded Weights in this Encounter    12/02/20 0525 12/03/20 0532 12/04/20 0415   Weight: 73.9 kg (162 lb 14.4 oz) 73.3 kg (161 lb 8 oz) 73.2 kg (161 lb 6.4 oz)       Intake/Output Summary (Last 24 hours) at 12/5/2020 0507  Last data filed at 12/5/2020 0300  Gross per 24 hour   Intake 9447.3 ml   Output 1665 ml   Net 7782.3 ml     Date 12/04/20 0700 - 12/05/20 0659 12/05/20 0700 - 12/06/20 0659   Shift 9187-4269 3246-3180 24 Hour Total 2492-3962 2516-7803 24 Hour Total   INTAKE   P.O. 0 120 120        P. O. 0 120 120      I. V.(mL/kg/hr) 8342.3(9.5)  8342.3        I.V. 750  750        Cardene Volume 0  0        Diprivan Volume 0  0        Heparin Volume 5001.3  5001. 3        Insulin Volume 30.6  30.6        Nitroglycerin Volume (ml) 15.9  15.9        Epinephrine Volume 9.9  9.9        Phenylephrine Volume 7.1  7.1        Volume (0.9% sodium chloride infusion 250 mL) 0  0        Volume (lactated [place] infusion) 201.3  201.3        Volume (lactated Ringers infusion) 300  300 Volume (lactated Ringers infusion) 100  100        Volume (0.9% sodium chloride infusion) 750  750        Volume (0.9% sodium chloride infusion) 122.5  122.5        Volume (dextrose 5% - 0.45% NaCl with KCl 20 mEq/L infusion) 113.8  113.8        Volume (albumin human 5% (BUMINATE) solution 25 g) 500  500        Volume (ceFAZolin (ANCEF) 2 g/20 mL in sterile water IV syringe) 40  40        Volume (albumin human 5% (BUMINATE) solution) 250  250        Volume (ceFAZolin (ANCEF) 2 g/20 mL in sterile water IV syringe) 0  0        Volume (magnesium sulfate 1 g/100 ml IVPB (premix or compounded)) 0  0        Volume (potassium chloride 10 mEq in 50 ml IVPB) 150  150      Blood 985  985        Autotransfused 675  675        Volume (TRANSFUSE PACKED RBC'S) 310  310      Shift Total(mL/kg) 9327. 3(127.4) 120(1.6) 4708.8(006)      OUTPUT   Urine(mL/kg/hr) 1170(1.3) 265 1435        Urine Output 625  625        Urine Output (mL) (Urinary Catheter 12/04/20 2- way; Jimenez - Temperature) 545 265 810      Emesis/NG output  0 0        Output (ml) ([REMOVED] Orogastric Tube 12/04/20)  0 0      Other 0  0        Other Output 0  0      Blood 0  0        Estimated Blood Loss 0  0      Chest Tube 120 110 230        Output (ml) (Chest Tube #1 12/04/20 Left;Straight) 120 110 230      Shift Total(mL/kg) 1290(17.6) 375(5.1) 1665(22.7)      NET 8037.3 -255 7782.3      Weight (kg) 73.2 73.2 73.2 73.2 73.2 73.2         Physical Exam:   Neuro:  Awake and alert. Nonfocal.  Neck:  No JVD  Chest:  Incision clean; sternum intact  Cor:  rrr without murmur, rub, or gallop. Lungs:  Clear to auscultation  Ext:  Incisions clean. Min edema.     Lab/Data Review:  BMP:   Lab Results   Component Value Date/Time     12/05/2020 03:12 AM    K 4.0 12/05/2020 03:12 AM     (H) 12/05/2020 03:12 AM    CO2 22 12/05/2020 03:12 AM    AGAP 6 (L) 12/05/2020 03:12 AM    GLU 90 12/05/2020 03:12 AM    BUN 15 12/05/2020 03:12 AM    CREA 0.96 12/05/2020 03:12 AM GFRAA >60 12/05/2020 03:12 AM    GFRNA >60 12/05/2020 03:12 AM     CMP:   Lab Results   Component Value Date/Time     12/05/2020 03:12 AM    K 4.0 12/05/2020 03:12 AM     (H) 12/05/2020 03:12 AM    CO2 22 12/05/2020 03:12 AM    AGAP 6 (L) 12/05/2020 03:12 AM    GLU 90 12/05/2020 03:12 AM    BUN 15 12/05/2020 03:12 AM    CREA 0.96 12/05/2020 03:12 AM    GFRAA >60 12/05/2020 03:12 AM    GFRNA >60 12/05/2020 03:12 AM    CA 8.8 12/05/2020 03:12 AM    MG 2.0 12/05/2020 03:12 AM     CBC:   Lab Results   Component Value Date/Time    WBC 8.9 12/05/2020 03:12 AM    HGB 10.2 (L) 12/05/2020 03:12 AM    HCT 31.0 (L) 12/05/2020 03:12 AM     (L) 12/05/2020 03:12 AM     All Cardiac Markers in the last 24 hours: No results found for: CPK, CK, CKMMB, CKMB, RCK3, CKMBT, CKNDX, CKND1, TAMI, TROPT, TROIQ, GEOVANI, TROPT, TNIPOC, BNP, BNPP  Recent Glucose Results:   Lab Results   Component Value Date/Time    GLU 90 12/05/2020 03:12 AM     (H) 12/04/2020 12:58 PM     ABG:   Lab Results   Component Value Date/Time    PHI 7.42 12/04/2020 07:57 PM    PCO2I 27.1 (L) 12/04/2020 07:57 PM    PO2I 97 12/04/2020 07:57 PM    HCO3I 17.7 (L) 12/04/2020 07:57 PM    FIO2I 30 12/04/2020 07:57 PM     COAGS:   Lab Results   Component Value Date/Time    APTT 33.7 12/04/2020 12:58 PM    PTP 17.7 (H) 12/04/2020 12:58 PM    INR 1.4 12/04/2020 12:58 PM     Liver Panel: No results found for: ALB, CBIL, TBIL, TP, GLOB, AGRAT, ASTPOC, ALTPOC, ALT, AP         Assessment:     The patient is presently POD #1 from a CABG x 3 and CHERYL ligation. Overall, patient is doing well. Major issues: NA    Principal Problem:    NSTEMI (non-ST elevated myocardial infarction) ([facility] [address] (11/29/2020)    Active Problems:    Hypertension ()      CKD (chronic kidney disease), stage III (12/10/2013)      Dyslipidemia (12/10/2013)      Iron deficiency anemia (3/21/2017)      Overview:  :  Has requires frequent transfusions. No obvious source identified. Coronary atherosclerosis of native coronary vessel (3/21/2017)      Overview:       1. Cardiac  Calcium score (12/28/08) : Total: 218. LAD: 129, Lcx: 29,       RCA: 60      2. Lexiscan Cardiolite (3/9/10):  Apical thinning. No ischemia. Normal       LV function EF 68%. Nonrheumatic aortic valve stenosis (10/15/2020)      Overview: Echo (11/6/20):  EF 60-65%. +DD. Mild LAE. Aortic Stenosis:  MG 13.  PG       22.  DI 0.45. Mild MR/TR. 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 Start beta blocker, ASA. Respiratory: Wean oxygen as tolerated. Renal: Stable. GI: Advance diet as tolerated. Neuro:  Stable. Wires: Stay in. Tubes: Out per protocol. Disposition: To stepdown.         Signed By: Irena Reese MD     December 5, 2020

## 2020-12-05 NOTE — PROGRESS NOTES
Physical Exam  Skin:            Comments: Dual skin assessment completed with second RN Fabian Pacheco no redness or breakdown noted to sacrum

## 2020-12-05 NOTE — PROGRESS NOTES
[facility] CARDIOLOGY PROGRESS NOTE           12/5/2020 12:03 PM    Admit Date: 11/29/2020      Subjective:     No o/e; in SR. S/p CABG 12/4/20. Chest tube in place-?lt small apical ptx    ROS:  Cardiovascular:  As noted above    Objective:      Vitals:    12/05/20 1000 12/05/20 1031 12/05/20 1100 12/05/20 1131   BP: (!) 96/53 (!) 97/50 (!) 99/51 (!) 105/51   Pulse: 65 63 63 64   Resp: 14 19 17 19   Temp:       SpO2: 90% 95% 94% 94%   Weight:       Height:           Physical Exam:  General-No Acute Distress  Neck- supple, no JVD  CV- regular rate and rhythm; low grade SHERI  Lung- clear bilaterally  Abd- soft, nontender, nondistended  Ext- no edema bilaterally. Skin- warm and dry    Data Review:   Recent Labs     12/05/20  0312 12/04/20  2152  12/04/20  1258  12/03/20  0330     --   --  143   < > 138   K 4.0 4.1   < > 3.5   < > 3.5   MG 2.0 2.2   < > 2.5*   < > 1.5*   BUN 15  --   --  16   < > 18   CREA 0.96  --   --  1.21*   < > 0.94   GLU 90  --   --  140*   < > 131*   WBC 8.9  --   --  14.9*   < > 5.8   HGB 10.2* 10.6*   < > 11.5*   < > 10.4*   HCT 31.0* 31.9*   < > 35.2*   < > 31.6*   *  --   --  150   < > 182   INR  --   --   --  1.4  --  1.2    < > = values in this interval not displayed.        Assessment/Plan:     Principal Problem:    NSTEMI (non-ST elevated myocardial infarction) ([address] (11/29/2020)  -s/p CABG with LIMA to diag, SVG to OM, SVG to PDA /atriclip (12/4/20)  -preop echo with EF 40-45% (prior from 11/6/20 with normal EF)  -on low dose coreg; add ACEI/ARB as BPs tolerate; currently limited by BPs  -on a high intensity statin    Active Problems:    Hypertension ()  -controlled      CKD (chronic kidney disease), stage III (12/10/2013)      Dyslipidemia (12/10/2013)      Iron deficiency anemia (3/21/2017)      Coronary atherosclerosis of native coronary vessel (3/21/2017)      Nonrheumatic aortic valve stenosis (10/15/2020)  -mild per last echo      Hypomagnesemia (11/29/2020)      Syncope and collapse (11/29/2020)      Encounter for weaning from ventilator ([facility] [address] (12/4/2020)      Hypoxia (12/4/2020)      S/P CABG x 3 (12/4/2020)      Felisa Paul MD  12/5/2020 12:03 PM

## 2020-12-05 NOTE — PROGRESS NOTES
TRANSFER - IN REPORT:    Verbal report received from Myrna(name) on Audelia Zepeda  being received from [facility] ICU(unit) for routine progression of care      Report consisted of patients Situation, Background, Assessment and   Recommendations(SBAR). Information from the following report(s) SBAR, Kardex and MAR was reviewed with the receiving nurse. Opportunity for questions and clarification was provided. Assessment completed upon patients arrival to unit and care assumed.

## 2020-12-06 ENCOUNTER — APPOINTMENT (OUTPATIENT)
Dept: GENERAL RADIOLOGY | Age: 75
DRG: 233 | End: 2020-12-06
Attending: THORACIC SURGERY (CARDIOTHORACIC VASCULAR SURGERY)
Payer: MEDICARE

## 2020-12-06 LAB
ANION GAP SERPL CALC-SCNC: 7 MMOL/L (ref 7–16)
ATRIAL RATE: 66 BPM
BUN SERPL-MCNC: 29 MG/DL (ref 8–23)
CALCIUM SERPL-MCNC: 8.7 MG/DL (ref 8.3–10.4)
CALCULATED P AXIS, ECG09: 71 DEGREES
CALCULATED R AXIS, ECG10: 78 DEGREES
CALCULATED T AXIS, ECG11: -22 DEGREES
CHLORIDE SERPL-SCNC: 100 MMOL/L (ref 98–107)
CO2 SERPL-SCNC: 21 MMOL/L (ref 21–32)
CREAT SERPL-MCNC: 2.15 MG/DL (ref 0.6–1)
DIAGNOSIS, 93000: NORMAL
ERYTHROCYTE [DISTWIDTH] IN BLOOD BY AUTOMATED COUNT: 15.6 % (ref 11.9–14.6)
GLUCOSE BLD STRIP.AUTO-MCNC: 146 MG/DL (ref 65–100)
GLUCOSE SERPL-MCNC: 147 MG/DL (ref 65–100)
HCT VFR BLD AUTO: 33 % (ref 35.8–46.3)
HGB BLD-MCNC: 10.3 G/DL (ref 11.7–15.4)
MAGNESIUM SERPL-MCNC: 2.3 MG/DL (ref 1.8–2.4)
MCH RBC QN AUTO: 28.5 PG (ref 26.1–32.9)
MCHC RBC AUTO-ENTMCNC: 31.2 G/DL (ref 31.4–35)
MCV RBC AUTO: 91.2 FL (ref 79.6–97.8)
MM INDURATION POC: 0 MM (ref 0–5)
NRBC # BLD: 0 K/UL (ref 0–0.2)
P-R INTERVAL, ECG05: 166 MS
PLATELET # BLD AUTO: 164 K/UL (ref 150–450)
PMV BLD AUTO: 11 FL (ref 9.4–12.3)
POTASSIUM SERPL-SCNC: 4.5 MMOL/L (ref 3.5–5.1)
PPD POC: NEGATIVE NEGATIVE
Q-T INTERVAL, ECG07: 414 MS
QRS DURATION, ECG06: 94 MS
QTC CALCULATION (BEZET), ECG08: 434 MS
RBC # BLD AUTO: 3.62 M/UL (ref 4.05–5.2)
SODIUM SERPL-SCNC: 128 MMOL/L (ref 136–145)
VENTRICULAR RATE, ECG03: 66 BPM
WBC # BLD AUTO: 15 K/UL (ref 4.3–11.1)

## 2020-12-06 PROCEDURE — 74011250637 HC RX REV CODE- 250/637: Performed by: THORACIC SURGERY (CARDIOTHORACIC VASCULAR SURGERY)

## 2020-12-06 PROCEDURE — 51798 US URINE CAPACITY MEASURE: CPT

## 2020-12-06 PROCEDURE — 65660000004 HC RM CVT STEPDOWN

## 2020-12-06 PROCEDURE — 85027 COMPLETE CBC AUTOMATED: CPT

## 2020-12-06 PROCEDURE — 77030012890

## 2020-12-06 PROCEDURE — 99232 SBSQ HOSP IP/OBS MODERATE 35: CPT | Performed by: INTERNAL MEDICINE

## 2020-12-06 PROCEDURE — 36415 COLL VENOUS BLD VENIPUNCTURE: CPT

## 2020-12-06 PROCEDURE — 97530 THERAPEUTIC ACTIVITIES: CPT

## 2020-12-06 PROCEDURE — 71045 X-RAY EXAM CHEST 1 VIEW: CPT

## 2020-12-06 PROCEDURE — 82962 GLUCOSE BLOOD TEST: CPT

## 2020-12-06 PROCEDURE — 2709999900 HC NON-CHARGEABLE SUPPLY

## 2020-12-06 PROCEDURE — 80048 BASIC METABOLIC PNL TOTAL CA: CPT

## 2020-12-06 PROCEDURE — 74011250636 HC RX REV CODE- 250/636: Performed by: THORACIC SURGERY (CARDIOTHORACIC VASCULAR SURGERY)

## 2020-12-06 PROCEDURE — 83735 ASSAY OF MAGNESIUM: CPT

## 2020-12-06 RX ORDER — SODIUM CHLORIDE 9 MG/ML
50 INJECTION, SOLUTION INTRAVENOUS ONCE
Status: COMPLETED | OUTPATIENT
Start: 2020-12-06 | End: 2020-12-06

## 2020-12-06 RX ORDER — GUAIFENESIN 600 MG/1
600 TABLET, EXTENDED RELEASE ORAL EVERY 12 HOURS
Status: DISCONTINUED | OUTPATIENT
Start: 2020-12-06 | End: 2020-12-10 | Stop reason: HOSPADM

## 2020-12-06 RX ADMIN — GUAIFENESIN 600 MG: 600 TABLET ORAL at 21:31

## 2020-12-06 RX ADMIN — ASPIRIN 81 MG: 81 TABLET ORAL at 09:31

## 2020-12-06 RX ADMIN — SENNOSIDES AND DOCUSATE SODIUM 2 TABLET: 8.6; 5 TABLET ORAL at 09:31

## 2020-12-06 RX ADMIN — Medication 10 ML: at 05:47

## 2020-12-06 RX ADMIN — OXYCODONE AND ACETAMINOPHEN 1 TABLET: 5; 325 TABLET ORAL at 06:12

## 2020-12-06 RX ADMIN — TRAMADOL HYDROCHLORIDE 50 MG: 50 TABLET ORAL at 21:30

## 2020-12-06 RX ADMIN — Medication 1 AMPULE: at 09:32

## 2020-12-06 RX ADMIN — SODIUM CHLORIDE 50 ML/HR: 900 INJECTION, SOLUTION INTRAVENOUS at 08:18

## 2020-12-06 RX ADMIN — ONDANSETRON 4 MG: 2 INJECTION INTRAMUSCULAR; INTRAVENOUS at 16:53

## 2020-12-06 RX ADMIN — ACETAMINOPHEN 650 MG: 325 TABLET, FILM COATED ORAL at 15:34

## 2020-12-06 RX ADMIN — ALUMINUM HYDROXIDE, MAGNESIUM HYDROXIDE, AND SIMETHICONE 30 ML: 200; 200; 20 SUSPENSION ORAL at 21:02

## 2020-12-06 RX ADMIN — Medication 1 AMPULE: at 21:31

## 2020-12-06 RX ADMIN — Medication 10 ML: at 21:32

## 2020-12-06 RX ADMIN — AMIODARONE HYDROCHLORIDE 200 MG: 200 TABLET ORAL at 21:31

## 2020-12-06 RX ADMIN — Medication 10 ML: at 16:53

## 2020-12-06 RX ADMIN — TRAMADOL HYDROCHLORIDE 50 MG: 50 TABLET ORAL at 09:34

## 2020-12-06 RX ADMIN — SENNOSIDES AND DOCUSATE SODIUM 2 TABLET: 8.6; 5 TABLET ORAL at 21:31

## 2020-12-06 RX ADMIN — ROSUVASTATIN CALCIUM 20 MG: 20 TABLET, COATED ORAL at 21:31

## 2020-12-06 RX ADMIN — AMIODARONE HYDROCHLORIDE 200 MG: 200 TABLET ORAL at 09:32

## 2020-12-06 RX ADMIN — ONDANSETRON 4 MG: 2 INJECTION INTRAMUSCULAR; INTRAVENOUS at 21:08

## 2020-12-06 RX ADMIN — PANTOPRAZOLE SODIUM 40 MG: 40 TABLET, DELAYED RELEASE ORAL at 05:47

## 2020-12-06 NOTE — PROGRESS NOTES
Problem: Falls - Risk of  Goal: *Absence of Falls  Description: Document David Hardy Fall Risk and appropriate interventions in the flowsheet.   Outcome: Progressing Towards Goal  Note: Fall Risk Interventions:  Mobility Interventions: Bed/chair exit alarm, Patient to call before getting OOB, Strengthening exercises (ROM-active/passive), Utilize walker, cane, or other assistive device, Communicate number of staff needed for ambulation/transfer         Medication Interventions: Bed/chair exit alarm, Evaluate medications/consider consulting pharmacy, Patient to call before getting OOB, Teach patient to arise slowly    Elimination Interventions: Bed/chair exit alarm, Call light in reach, Patient to call for help with toileting needs    History of Falls Interventions: Bed/chair exit alarm, Door open when patient unattended, Evaluate medications/consider consulting pharmacy         Problem: Patient Education: Go to Patient Education Activity  Goal: Patient/Family Education  Outcome: Progressing Towards Goal

## 2020-12-06 NOTE — PROGRESS NOTES
Progress Note    Patient: Juanito Dixon MRN: [id number]  SSN: [id number]    YOB: 1945  Age: 67 y.o. Sex: female      Admit Date: 11/29/2020    LOS: 7 days    POD 2 s/p:  1. Endoscopic vein harvest left leg. 2.  Clipping of the left atrial appendage with a 40-mm AtriClip. 3.  Coronary artery bypass grafting x3. Subjective / Interval Events:     No issues. On 2L NC. Objective:     Vitals:    12/05/20 1321 12/05/20 1617 12/05/20 1938 12/05/20 2301   BP: (!) 113/58 (!) 123/58 (!) 111/54 (!) 108/55   Pulse: 67 63 62 62   Resp: 18 16 18 18   Temp: 97 °F (36.1 °C) 96.8 °F (36 °C) 97.2 °F (36.2 °C) 97.6 °F (36.4 °C)   SpO2: 92% 91% 95% 91%   Weight:       Height:            Hemodynamics:  [place] out. Intake and Output:  Current Shift: 12/05 1901 - 12/06 0700  In: 360 [P.O.:360]  Out: 175 [Urine:150]  Chest tube drainage in 24 hrs: 230  Last three shifts: 12/04 0701 - 12/05 1900  In: 83524.3 [P.O.:600; I.V.:9145.3]  Out: 9809 [Urine:1765]  Last 3 Recorded Weights in this Encounter    12/03/20 0532 12/04/20 0415 12/05/20 0431   Weight: 73.3 kg (161 lb 8 oz) 73.2 kg (161 lb 6.4 oz) 98.7 kg (217 lb 9.5 oz)       Intake/Output Summary (Last 24 hours) at 12/6/2020 0241  Last data filed at 12/5/2020 2301  Gross per 24 hour   Intake 1703.04 ml   Output 635 ml   Net 1068.04 ml     Date 12/05/20 0700 - 12/06/20 0659 12/06/20 0700 - 12/07/20 0659   Shift 3008-6354 0861-2125 24 Hour Total 7766-734576-2813 9252-9502 24 Hour Total   INTAKE   P.O. 480 360 840        P. O. 480 360 840      I. V.(mL/kg/hr) 229.4(0.2)  229.4        I.V. 60  60        Cardene Volume 0  0        Insulin Volume 1.4  1.4        Nitroglycerin Volume (ml) 0  0        Epinephrine Volume 0  0        Phenylephrine Volume 0  0        Volume (0.9% sodium chloride infusion) 0  0        Volume (dextrose 5% - 0.45% NaCl with KCl 20 mEq/L infusion) 17.9  17.9        Volume (albumin human 5% (BUMINATE) solution 25 g) 0  0        Volume (magnesium sulfate 1 g/100 ml IVPB (premix or compounded)) 100  100        Volume (potassium chloride 10 mEq in 50 ml IVPB) 50  50      Shift Total(mL/kg) 709.4(7.2) 360(3.6) 1069. 4(10.8)      OUTPUT   Urine(mL/kg/hr) 170(0.1) 150 320        Urine Voided  150 150        Urine Output (mL) ([REMOVED] Urinary Catheter 12/04/20 2- way; [place] - Temperature) 170  170      Chest Tube 155 25 180        Output (ml) (Chest Tube #1 12/04/20 Left;Straight) 155 25 180      Shift Total(mL/kg) 325(3.3) 175(1.8) 500(5.1)      .4 185 569.4      Weight (kg) 98.7 98.7 98.7 98.7 98.7 98.7         Physical Exam:   Neuro:  Awake and alert. Nonfocal.  Neck:  No JVD  Chest:  Incision clean; sternum intact  Cor:  rrr without murmur, rub, or gallop. Lungs:  Clear to auscultation  Ext:  Incisions clean. Min edema.     Lab/Data Review:  BMP:   Lab Results   Component Value Date/Time     12/05/2020 03:12 AM    K 4.0 12/05/2020 03:12 AM     (H) 12/05/2020 03:12 AM    CO2 22 12/05/2020 03:12 AM    AGAP 6 (L) 12/05/2020 03:12 AM    GLU 90 12/05/2020 03:12 AM    BUN 15 12/05/2020 03:12 AM    CREA 0.96 12/05/2020 03:12 AM    GFRAA >60 12/05/2020 03:12 AM    GFRNA >60 12/05/2020 03:12 AM     CMP:   Lab Results   Component Value Date/Time     12/05/2020 03:12 AM    K 4.0 12/05/2020 03:12 AM     (H) 12/05/2020 03:12 AM    CO2 22 12/05/2020 03:12 AM    AGAP 6 (L) 12/05/2020 03:12 AM    GLU 90 12/05/2020 03:12 AM    BUN 15 12/05/2020 03:12 AM    CREA 0.96 12/05/2020 03:12 AM    GFRAA >60 12/05/2020 03:12 AM    GFRNA >60 12/05/2020 03:12 AM    CA 8.8 12/05/2020 03:12 AM    MG 2.0 12/05/2020 03:12 AM     CBC:   Lab Results   Component Value Date/Time    WBC 8.9 12/05/2020 03:12 AM    HGB 10.2 (L) 12/05/2020 03:12 AM    HCT 31.0 (L) 12/05/2020 03:12 AM     (L) 12/05/2020 03:12 AM     All Cardiac Markers in the last 24 hours: No results found for: CPK, CK, CKMMB, CKMB, RCK3, CKMBT, CKNDX, CKND1, TAMI, TROPT, TROIQ, GEOVANI, TROPT, TNIPOC, BNP, BNPP  Recent Glucose Results:   Lab Results   Component Value Date/Time    GLU 90 12/05/2020 03:12 AM     ABG:   No results found for: PH, PHI, PCO2, PCO2I, PO2, PO2I, HCO3, HCO3I, FIO2, FIO2I  COAGS:   No results found for: APTT, PTP, INR, INREXT, INREXT, INREXT  Liver Panel: No results found for: ALB, CBIL, TBIL, TP, GLOB, AGRAT, ASTPOC, ALTPOC, ALT, AP         Assessment:     The patient is presently POD #1 from a CABG x 3 and CHERYL ligation. Overall, patient is doing well. Major issues: NA    Principal Problem:    NSTEMI (non-ST elevated myocardial infarction) ([facility] [address] (11/29/2020)    Active Problems:    Hypertension ()      CKD (chronic kidney disease), stage III (12/10/2013)      Dyslipidemia (12/10/2013)      Iron deficiency anemia (3/21/2017)      Overview:  :  Has requires frequent transfusions. No obvious source identified. Coronary atherosclerosis of native coronary vessel (3/21/2017)      Overview:       1. Cardiac  Calcium score (12/28/08) : Total: 218. LAD: 129, Lcx: 29,       RCA: 60      2. Lexiscan Cardiolite (3/9/10):  Apical thinning. No ischemia. Normal       LV function EF 68%. Nonrheumatic aortic valve stenosis (10/15/2020)      Overview: Echo (11/6/20):  EF 60-65%. +DD. Mild LAE. Aortic Stenosis:  MG 13.  PG       22.  DI 0.45. Mild MR/TR. Hypomagnesemia (11/29/2020)      Syncope and collapse (11/29/2020)      Encounter for weaning from ventilator ([facility] [address] (12/4/2020)      Hypoxia (12/4/2020)      S/P CABG x 3 (12/4/2020)        Plan:       Cardiac: Stable. Start beta blocker, ASA. Respiratory: Wean oxygen as tolerated. Check repeat CXR:  If PTX stable or smaller, d/c tubes. Renal: Stable. GI: Advance diet as tolerated. Neuro:  Stable. Wires: Remove. .    Tubes: Out per protocol. Disposition:  Stepdown.         Signed By: Nasim Howard MD     December 6, 2020

## 2020-12-06 NOTE — PROGRESS NOTES
Torres Zepeda  Admission Date: 11/29/2020             Daily Progress Note: 12/6/2020    The patient's chart is reviewed and the patient is discussed with the staff. This is a 42-year-old female with known history of peripheral vascular disease and carotid artery disease admitted 11/29 after a syncopal episode. Vevelyn Klinefelter has had exertional chest pain concerning for angina several months.  She states she awoke early in the morning and was going to her bathroom when she had a syncopal event.  Felt \"dizziness\" prior to the event but no tachycardia or chest discomfort. Vevelyn Klinefelter has been dealing with significant anemia and has required iron and packed red blood cell transfusion  She was transferred to [facility]. Troponin continued to rise consistent with NSTEMI. She underwent cardiac catheterization today that showed high grade stenosis in the LCx and RCA. LV gram showed mild to moderately reduced LV function with inferolateral hypokinesis. There was no significant gradient across the AV. She has \"smoldering\" multiple myeloma as well as TAYLOR. Patient is seen at the request of Dr. Chapin Johnson for respiratory management status post cardiac surgery. Patient had cabg x 3. Currently is sedated in CV-ICU and orally intubated receiving  mechanical ventilation.     We have been asked to see in the CV-ICU for mechanical ventilation     Subjective: On RA with O2 sat 90-91%. Creatinine jumped this AM. AM lasix to be held. Currently receiving 1 liter NS fluid bolus.      Current Facility-Administered Medications   Medication Dose Route Frequency    sodium chloride (NS) flush 5-40 mL  5-40 mL IntraVENous Q8H    sodium chloride (NS) flush 5-40 mL  5-40 mL IntraVENous PRN    furosemide (LASIX) tablet 40 mg  40 mg Oral DAILY    alum-mag hydroxide-simeth (MYLANTA) oral suspension 30 mL  30 mL Oral Q4H PRN    acetaminophen (TYLENOL) tablet 650 mg  650 mg Oral Q4H PRN    zolpidem (AMBIEN) tablet 5 mg  5 mg Oral QHS PRN  amiodarone (CORDARONE) tablet 200 mg  200 mg Oral BID    aspirin delayed-release tablet 81 mg  81 mg Oral DAILY    magnesium oxide (MAG-OX) tablet 400 mg  400 mg Oral TID PRN    magnesium oxide (MAG-OX) tablet 400 mg  400 mg Oral QID PRN    potassium chloride (KLOR-CON) tablet 10 mEq  10 mEq Oral DAILY    potassium chloride (K-DUR, KLOR-CON) SR tablet 20 mEq  20 mEq Oral BID PRN    potassium chloride (K-DUR, KLOR-CON) SR tablet 40 mEq  40 mEq Oral BID PRN    oxyCODONE-acetaminophen (PERCOCET) 5-325 mg per tablet 1 Tab  1 Tab Oral Q4H PRN    traMADoL (ULTRAM) tablet 50 mg  50 mg Oral Q6H PRN    senna-docusate (PERICOLACE) 8.6-50 mg per tablet 2 Tab  2 Tab Oral Q12H    insulin lispro (HUMALOG) injection   SubCUTAneous AC&HS    alcohol 62% (NOZIN) nasal  1 Ampule  1 Ampule Topical Q12H    ondansetron (ZOFRAN) injection 4 mg  4 mg IntraVENous Q6H PRN    carvediloL (COREG) tablet 3.125 mg  3.125 mg Oral BID WITH MEALS    rosuvastatin (CRESTOR) tablet 20 mg  20 mg Oral QHS    pantoprazole (PROTONIX) tablet 40 mg  40 mg Oral ACB       Review of Systems        Constitutional: negative for fever, chills, sweats  Cardiovascular: negative for chest pain, palpitations, syncope, edema  Gastrointestinal:  negative for dysphagia, reflux, vomiting, diarrhea, abdominal pain, or melena  Neurologic:  negative for focal weakness, numbness, headache    Objective:     Vitals:    12/06/20 0557 12/06/20 0612 12/06/20 0615 12/06/20 0707   BP: (!) 122/57 (!) 118/57  (!) 100/55   Pulse: 68 66  67   Resp:    14   Temp:    97 °F (36.1 °C)   SpO2:    90%   Weight:   173 lb (78.5 kg)    Height:             Intake/Output Summary (Last 24 hours) at 12/6/2020 0802  Last data filed at 12/6/2020 0650  Gross per 24 hour   Intake 909.35 ml   Output 380 ml   Net 529.35 ml       Physical Exam:   Constitution:  the patient is well developed and in no acute distress  EENMT:  Sclera clear, pupils equal, oral mucosa moist  Respiratory: CTA  Cardiovascular:  RRR without M,G,R  Gastrointestinal: soft and non-tender; with positive bowel sounds. Musculoskeletal: warm without cyanosis. There is no lower extremity edema. Skin:  no jaundice or rashes, sternal wound is okay  Neurologic: no gross neuro deficits     Psychiatric:  alert and oriented x 3    CXR:   12/6/2020 12/5/2020      LAB  Recent Labs     12/06/20  0545 12/05/20 2151 12/05/20  1614 12/05/20  1148 12/05/20  0722   GLUCPOC 146* 204* 142* 138* 93      Recent Labs     12/06/20  0336 12/05/20 0312 12/04/20 2152 12/04/20  1705 12/04/20  1258 12/04/20  0357   WBC 15.0* 8.9  --   --  14.9* 6.1   HGB 10.3* 10.2* 10.6* 11.4* 11.5* 9.9*   HCT 33.0* 31.0* 31.9* 34.9* 35.2* 30.6*    112*  --   --  150 189   INR  --   --   --   --  1.4  --      Recent Labs     12/06/20 0336 12/05/20 0312 12/04/20 2152 12/04/20  1258   * 141  --   --  143   K 4.5 4.0 4.1   < > 3.5    113*  --   --  115*   CO2 21 22  --   --  22   * 90  --   --  140*   BUN 29* 15  --   --  16   CREA 2.15* 0.96  --   --  1.21*   MG 2.3 2.0 2.2   < > 2.5*   CA 8.7 8.8  --   --  8.7    < > = values in this interval not displayed. Recent Labs     12/04/20 1957 12/04/20  1256 12/04/20  1204   PHI 7.42 7.36 7.36   PCO2I 27.1* 33.1* 36.5   PO2I 97 110* 399*   HCO3I 17.7* 18.5* 20.4*     No results for input(s): LCAD, LAC in the last 72 hours.       Assessment:  (Medical Decision Making)     Hospital Problems  Date Reviewed: 12/4/2020          Codes Class Noted POA    Encounter for weaning from ventilator [facility]) ICD-10-CM: Z99.11  ICD-9-CM: V46.13  12/4/2020 Unknown        Hypoxia ICD-10-CM: R09.02  ICD-9-CM: 799.02  12/4/2020 Unknown        S/P CABG x 3 ICD-10-CM: Z95.1  ICD-9-CM: V45.81  12/4/2020 Unknown        * (Principal) NSTEMI (non-ST elevated myocardial infarction) ([facility] [address] ICD-10-CM: I21.4  ICD-9-CM: 410.70  11/29/2020 Yes        Hypomagnesemia ICD-10-CM: K48.72  ICD-9-CM: 275.2  11/29/2020 Yes        Syncope and collapse ICD-10-CM: R55  ICD-9-CM: 780.2  11/29/2020 Yes        Nonrheumatic aortic valve stenosis (Chronic) ICD-10-CM: I35.0  ICD-9-CM: 424.1  10/15/2020 Yes    Overview Signed 11/10/2020  9:02 PM by Dao Figueroa MD     Echo (11/6/20):  EF 60-65%. +DD. Mild LAE. Aortic Stenosis:  MG 13.  PG 22.  DI 0.45. Mild MR/TR. Iron deficiency anemia (Chronic) ICD-10-CM: D50.9  ICD-9-CM: 280.9  3/21/2017 Yes    Overview Signed 3/21/2017  3:56 PM by Lucinda CORRALES      :  Has requires frequent transfusions. No obvious source identified. Coronary atherosclerosis of native coronary vessel (Chronic) ICD-10-CM: I25.10  ICD-9-CM: 414.01  3/21/2017 Yes    Overview Signed 3/21/2017  3:57 PM by Esther Hernandez       1. Cardiac  Calcium score (12/28/08) : Total: 218. LAD: 129, Lcx: 29, RCA: 60  2. Lexiscan Cardiolite (3/9/10):  Apical thinning. No ischemia. Normal LV function EF 68%. Hypertension (Chronic) ICD-10-CM: I10  ICD-9-CM: 401.9  Unknown Yes        CKD (chronic kidney disease), stage III (Chronic) ICD-10-CM: N18.30  ICD-9-CM: 585.3  12/10/2013 Yes        Dyslipidemia (Chronic) ICD-10-CM: E78.5  ICD-9-CM: 272.4  12/10/2013 Yes              Plan:  (Medical Decision Making)   --cont IS  --now on RA  --monitor creatinine   --hold AM lasix  --PTX resolved ok to pull CT if MD agrees    More than 50% of the time documented was spent in face-to-face contact with the patient and in the care of the patient on the floor/unit where the patient is located. Alena Negrete NP      Lungs:  Clear  Heart:  RRR with no Murmur/Rubs/Gallops    Additional Comments: On RA, CXR clear,no PTX nothing else to add ,will sign off, call if needed    I have spoken with and examined the patient. I agree with the above assessment and plan as documented.     Zulema Garay MD

## 2020-12-06 NOTE — PROGRESS NOTES
Pacing wires pulled by Dr. Rose Marie Leahy. Patient instructed to one hour bedrest with every 15 minute blood pressure checks for one hour. Pt voices understanding.

## 2020-12-06 NOTE — PROGRESS NOTES
Verbal bedside report given to oncoming RN, Quincy Weir. Patient's situation, background, assessment and recommendations provided. Opportunity for questions provided. Oncoming RN assumed care of patient.

## 2020-12-06 NOTE — PROGRESS NOTES
Pt ambulated in brock attempted to bathroom unable to void. Bladder scanned 165 noted in bladder. Patient has been drinking PO well. Dressing to L CT site saturated ABD pad. Area cleaned with CHG wipe and new 4x4 gauze and ABD applied.  IV fluid increased to 100 cc/hr

## 2020-12-06 NOTE — PROGRESS NOTES
Problem: Mobility Impaired (Adult and Pediatric)  Goal: *Acute Goals and Plan of Care (Insert Text)  Outcome: Progressing Towards Goal  Note: LTG:  (1.)Ms. Zepeda will move from supine to sit and sit to supine , scoot up and down, and roll side to side with INDEPENDENT within 7 treatment day(s). (2.)Ms. Zepeda will transfer from bed to chair and chair to bed with INDEPENDENT using the least restrictive device within 7 treatment day(s). (3.)Ms. Zepeda will ambulate with SUPERVISION for 500+ feet with the least restrictive device within 7 treatment day(s) while maintaining normal vital signs. (4.)Ms. Zepeda will perform 1-2 steps with CGA within 7 treatment days for safety ascending and descending stairs for home.   _________________________________________________________________________________________      PHYSICAL THERAPY: Daily Note and AM 12/6/2020  INPATIENT: PT Visit Days : 2  [place] / [place] / Product Type: Medicare /       NAME/AGE/GENDER: Eugenio Roman is a 67 y.o. female   PRIMARY DIAGNOSIS: Chest pain [R07.9] NSTEMI (non-ST elevated myocardial infarction) ([facility] [address] NSTEMI (non-ST elevated myocardial infarction) ([facility] [address]  Procedure(s) (LRB):  CORONARY ARTERY BYPASS GRAFT (CABG X3) LIMA / CLIPPING OF LEFT ATRIAL APPENDAGE (N/A)  VEIN HARVEST ENDOSCOPIC (Left)  2 Days Post-Op  ICD-10: Treatment Diagnosis:    · Difficulty in walking, Not elsewhere classified (R26.2)   Precaution/Allergies:  Toprol xl [metoprolol succinate]      ASSESSMENT:     Ms. Adair Shane presents with decreased bed mobility, transfers, ambulation, balance, activity tolerance, and overall general functional mobility s/p hospital admission with CABG x 3 on 12/4/20. Pt history significant for multiple myeloma. Pt A & O x 3 this date, 2 L O2 via n.c, chest tube, coleman. Pt reports lives with spouse in 1 story home, 1 step to enter. Pt states independent at baseline, drives, no falls.     Pt in chair on contact. Min a for sit to stand transfers. Pt with fair static and dynamic standing balance with RW. She amb into BR and tried to void but was unable. She stood from toilet and was able to amb 220' w/ RW. She took rest breaks along the way several times. She returned to chair in room and after a short rest she performed B LE ex as listed below. PT to cont to follow for acute care needs to address deficits noted above. Discharge needs pending progress with mobility.          This section established at most recent assessment   PROBLEM LIST (Impairments causing functional limitations):  1. Decreased ADL/Functional Activities  2. Decreased Transfer Abilities  3. Decreased Ambulation Ability/Technique  4. Decreased Balance  5. Decreased Activity Tolerance  6. Increased Fatigue  7. Decreased Vance with Home Exercise Program   INTERVENTIONS PLANNED: (Benefits and precautions of physical therapy have been discussed with the patient.)  1. Balance Exercise  2. Bed Mobility  3. Family Education  4. Gait Training  5. Home Exercise Program (HEP)  6. Therapeutic Activites  7. Therapeutic Exercise/Strengthening  8. Transfer Training     TREATMENT PLAN: Frequency/Duration: twice daily for duration of hospital stay  Rehabilitation Potential For Stated Goals: Good     REHAB RECOMMENDATIONS (at time of discharge pending progress):    Placement: It is my opinion, based on this patient's performance to date, that Ms. Zepeda may benefit from participating in 1-2 additional therapy sessions in order to continue to assess for rehab potential and then make recommendation for disposition at discharge. Equipment:    To be determined-BSC and possible RW              HISTORY:   History of Present Injury/Illness (Reason for Referral):  67year old known female patient with CAD and NSTEMI with plans for intervention by Dr. Mingo Henry, chronic TAYLOR with history of small bowel AVMs. and smoldering multiple myeloma who we are following for anemia and bleeding. Hgb 7.2 on 12/1 despite PRBCs. Has 2 more units scheduled. Plan for EGD with push enteroscopy today prior to CABG. Cardiology clearance obtained by Dr. Hiral Whitehead. Past Medical History/Comorbidities:   Ms. Mango Khan  has a past medical history of Anemia, Mcneil's esophagus, Coronary atherosclerosis of native coronary vessel, GERD (gastroesophageal reflux disease), HLD (hyperlipidemia), Hypercholesterolemia, Hypertension, Mild aortic stenosis, Occlusion and stenosis of carotid artery without mention of cerebral infarction, Osteoarthritis, PAD (peripheral artery disease) ([facility] [place] [address] PONV (postoperative nausea and vomiting), S/P insertion of iliac artery stent, and Shingles. Ms. Mango Khan  has a past surgical history that includes hx angioplasty; hx endoscopy; hx colonoscopy; hx appendectomy; hx tonsillectomy; hx orthopaedic; hx hysterectomy; vascular surgery procedure unlist (Left, 2006); hx carotid endarterectomy (Left, 03/30/2017); and hx carotid endarterectomy (Right, 05/15/2017). 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 Overall Behavior:  agreeable   Number of Personal Factors/Comorbidities that affect the Plan of Care:   Anemia, age, CAD 3+: HIGH COMPLEXITY   EXAMINATION:   Most Recent Physical Functioning:   Gross Assessment:                  Posture:  Posture Assessment: Rounded shoulders  Balance:  Sitting: Intact  Standing: Impaired  Standing - Static: Fair  Standing - Dynamic : Fair Bed Mobility:     Wheelchair Mobility:     Transfers:  Sit to Stand: Minimum assistance  Stand to Sit: Contact guard assistance  Gait:     Base of Support: Narrowed  Speed/Sarika: Shuffled; Slow  Step Length: Left shortened;Right shortened  Distance (ft): 220 Feet (ft)  Assistive Device: Walker, rolling  Ambulation - Level of Assistance: Contact guard assistance  Interventions: Safety awareness training;Verbal cues      Body Structures Involved:  1. Heart  2. Lungs  3. Muscles Body Functions Affected:  1. Cardio  2. Respiratory  3. Movement Related Activities and Participation Affected:  1. General Tasks and Demands  2. Mobility  3. Self Care   Number of elements that affect the Plan of Care: 4+: HIGH COMPLEXITY   CLINICAL PRESENTATION:   Presentation: Evolving clinical presentation with changing clinical characteristics: MODERATE COMPLEXITY   CLINICAL DECISION MAKIN [facility] Mobility Inpatient Short Form  How much difficulty does the patient currently have. .. Unable A Lot A Little None   1. Turning over in bed (including adjusting bedclothes, sheets and blankets)? [] 1   [x] 2   [] 3   [] 4   2. Sitting down on and standing up from a chair with arms ( e.g., wheelchair, bedside commode, etc.)   [] 1   [x] 2   [] 3   [] 4   3. Moving from lying on back to sitting on the side of the bed? [] 1   [x] 2   [] 3   [] 4   How much help from another person does the patient currently need. .. Total A Lot A Little None   4. Moving to and from a bed to a chair (including a wheelchair)? [] 1   [] 2   [x] 3   [] 4   5. Need to walk in hospital room? [] 1   [] 2   [x] 3   [] 4   6. Climbing 3-5 steps with a railing? [] 1   [x] 2   [] 3   [] 4   © 2007, Trustees of [facility], under license to [employer].  All rights reserved      Score:  Initial: 14 Most Recent: X (Date: -- )    Interpretation of Tool:  Represents activities that are increasingly more difficult (i.e. Bed mobility, Transfers, Gait).    Medical Necessity:     · Patient is expected to demonstrate progress in   · strength, range of motion, balance, and coordination  ·  to   · decrease assistance required with overall functional mobility, transfers, ambulation  · .  · Patient demonstrates   · good  ·  rehab potential due to higher previous functional level. Reason for Services/Other Comments:  · Patient   · continues to require present interventions due to patient's inability to perform bed mobility, transfers, ambulation  · . Use of outcome tool(s) and clinical judgement create a POC that gives a: Clear prediction of patient's progress: LOW COMPLEXITY            TREATMENT:   (In addition to Assessment/Re-Assessment sessions the following treatments were rendered)   Pre-treatment Symptoms/Complaints:  \"I'm ready now. \"  Pain: Initial:      Post Session:  C/o low back pain 5/10     Therapeutic Activity: (    26 min): Therapeutic activities including Chair transfers, Toilet transfers, Ambulation on level ground and weight shifting, walker safety, posture, pursed lip breathing to improve mobility, strength, balance and coordination. Required minimal Safety awareness training;Verbal cues to promote static and dynamic balance in standing and promote coordination of bilateral, lower extremity(s). Braces/Orthotics/Lines/Etc:   · roon air  Treatment/Session Assessment:    · Response to Treatment:  tolerated well  · Interdisciplinary Collaboration:   o Physical Therapy Assistant  o Registered Nurse  · After treatment position/precautions:   o Up in chair  o Bed alarm/tab alert on  o Bed/Chair-wheels locked  o Bed in low position  o Call light within reach  o RN notified   · Compliance with Program/Exercises: Will assess as treatment progresses  · Recommendations/Intent for next treatment session: \"Next visit will focus on advancements to more challenging activities\".   Total Treatment Duration:  PT Patient Time In/Time Out  Time In: 1114  Time Out: [address] [facility] [place]

## 2020-12-06 NOTE — PROGRESS NOTES
[facility] CARDIOLOGY PROGRESS NOTE           12/6/2020 9:53 AM    Admit Date: 11/29/2020      Subjective:     No o/e; in SR. S/p CABG 12/4/20. Chest tube in place-?lt small apical ptx; plans to pull today    ROS:  Cardiovascular:  As noted above    Objective:      Vitals:    12/06/20 0557 12/06/20 0612 12/06/20 0615 12/06/20 0707   BP: (!) 122/57 (!) 118/57  (!) 100/55   Pulse: 68 66  67   Resp:    14   Temp:    97 °F (36.1 °C)   SpO2:    90%   Weight:   173 lb (78.5 kg)    Height:           Physical Exam:  General-No Acute Distress  Neck- supple, no JVD  CV- regular rate and rhythm; low grade SHERI  Lung- min basilar rales  Abd- soft, nontender, nondistended  Ext- no edema bilaterally. Skin- warm and dry    Data Review:   Recent Labs     12/06/20  0336 12/05/20  0312  12/04/20  1258   * 141  --  143   K 4.5 4.0   < > 3.5   MG 2.3 2.0   < > 2.5*   BUN 29* 15  --  16   CREA 2.15* 0.96  --  1.21*   * 90  --  140*   WBC 15.0* 8.9  --  14.9*   HGB 10.3* 10.2*   < > 11.5*   HCT 33.0* 31.0*   < > 35.2*    112*  --  150   INR  --   --   --  1.4    < > = values in this interval not displayed. Assessment/Plan:     Principal Problem:    NSTEMI (non-ST elevated myocardial infarction) ([facility] [address] (11/29/2020)  -s/p CABG with LIMA to diag, SVG to OM, SVG to PDA /atriclip (12/4/20)  -preop echo with EF 40-45% (prior from 11/6/20 with normal EF)  -on low dose coreg; add ACEI/ARB as BPs tolerate/renal fn improves; worsened Cr overnight  -on a high intensity statin    Active Problems:    Hypertension ()  -controlled      CKD (chronic kidney disease), stage III (12/10/2013)  -Worsened overnight; hold lasix; receiving IVF.  Exam euvolemic      Dyslipidemia (12/10/2013)      Iron deficiency anemia (3/21/2017)      Coronary atherosclerosis of native coronary vessel (3/21/2017)      Nonrheumatic aortic valve stenosis (10/15/2020)  -mild per last echo      Hypomagnesemia (11/29/2020)      Syncope and collapse (11/29/2020)      Encounter for weaning from ventilator ([facility] [address] (12/4/2020)      Hypoxia (12/4/2020)      S/P CABG x 3 (12/4/2020)      Arianne De La Fuente MD  12/6/2020 9:53 AM

## 2020-12-06 NOTE — ROUTINE PROCESS
Bedside report given to [facility], RN. Opportunity for questions, concerns. Patient involved.

## 2020-12-07 ENCOUNTER — APPOINTMENT (OUTPATIENT)
Dept: GENERAL RADIOLOGY | Age: 75
DRG: 233 | End: 2020-12-07
Attending: INTERNAL MEDICINE
Payer: MEDICARE

## 2020-12-07 LAB
ABO + RH BLD: NORMAL
ANION GAP SERPL CALC-SCNC: 10 MMOL/L (ref 7–16)
BLD PROD TYP BPU: NORMAL
BLOOD GROUP ANTIBODIES SERPL: NORMAL
BPU ID: NORMAL
BUN SERPL-MCNC: 35 MG/DL (ref 8–23)
CALCIUM SERPL-MCNC: 8.4 MG/DL (ref 8.3–10.4)
CHLORIDE SERPL-SCNC: 99 MMOL/L (ref 98–107)
CO2 SERPL-SCNC: 16 MMOL/L (ref 21–32)
CREAT SERPL-MCNC: 2.2 MG/DL (ref 0.6–1)
CROSSMATCH RESULT,%XM: NORMAL
GLUCOSE SERPL-MCNC: 142 MG/DL (ref 65–100)
MAGNESIUM SERPL-MCNC: 2 MG/DL (ref 1.8–2.4)
MM INDURATION POC: 0 MM (ref 0–5)
POTASSIUM SERPL-SCNC: 4.1 MMOL/L (ref 3.5–5.1)
PPD POC: NEGATIVE NEGATIVE
SODIUM SERPL-SCNC: 125 MMOL/L (ref 136–145)
SPECIMEN EXP DATE BLD: NORMAL
STATUS OF UNIT,%ST: NORMAL
UNIT DIVISION, %UDIV: 0

## 2020-12-07 PROCEDURE — 97530 THERAPEUTIC ACTIVITIES: CPT

## 2020-12-07 PROCEDURE — 74011250636 HC RX REV CODE- 250/636: Performed by: PHYSICIAN ASSISTANT

## 2020-12-07 PROCEDURE — 77030012890

## 2020-12-07 PROCEDURE — 65660000004 HC RM CVT STEPDOWN

## 2020-12-07 PROCEDURE — 74011250637 HC RX REV CODE- 250/637: Performed by: THORACIC SURGERY (CARDIOTHORACIC VASCULAR SURGERY)

## 2020-12-07 PROCEDURE — 2709999900 HC NON-CHARGEABLE SUPPLY

## 2020-12-07 PROCEDURE — 36415 COLL VENOUS BLD VENIPUNCTURE: CPT

## 2020-12-07 PROCEDURE — 80048 BASIC METABOLIC PNL TOTAL CA: CPT

## 2020-12-07 PROCEDURE — 71046 X-RAY EXAM CHEST 2 VIEWS: CPT

## 2020-12-07 PROCEDURE — 83735 ASSAY OF MAGNESIUM: CPT

## 2020-12-07 PROCEDURE — 99232 SBSQ HOSP IP/OBS MODERATE 35: CPT | Performed by: INTERNAL MEDICINE

## 2020-12-07 RX ORDER — ALBUTEROL SULFATE 0.83 MG/ML
2.5 SOLUTION RESPIRATORY (INHALATION)
Status: DISCONTINUED | OUTPATIENT
Start: 2020-12-07 | End: 2020-12-10 | Stop reason: HOSPADM

## 2020-12-07 RX ORDER — BISACODYL 5 MG
5 TABLET, DELAYED RELEASE (ENTERIC COATED) ORAL DAILY PRN
Status: DISCONTINUED | OUTPATIENT
Start: 2020-12-07 | End: 2020-12-10 | Stop reason: HOSPADM

## 2020-12-07 RX ORDER — SODIUM CHLORIDE 9 MG/ML
75 INJECTION, SOLUTION INTRAVENOUS CONTINUOUS
Status: DISCONTINUED | OUTPATIENT
Start: 2020-12-07 | End: 2020-12-10 | Stop reason: HOSPADM

## 2020-12-07 RX ORDER — AMOXICILLIN 250 MG
2 CAPSULE ORAL
Status: DISCONTINUED | OUTPATIENT
Start: 2020-12-08 | End: 2020-12-10 | Stop reason: HOSPADM

## 2020-12-07 RX ADMIN — TRAMADOL HYDROCHLORIDE 50 MG: 50 TABLET ORAL at 18:05

## 2020-12-07 RX ADMIN — CARVEDILOL 3.12 MG: 3.12 TABLET, FILM COATED ORAL at 17:22

## 2020-12-07 RX ADMIN — Medication 10 ML: at 21:50

## 2020-12-07 RX ADMIN — Medication 10 ML: at 06:18

## 2020-12-07 RX ADMIN — TRAMADOL HYDROCHLORIDE 50 MG: 50 TABLET ORAL at 09:09

## 2020-12-07 RX ADMIN — ACETAMINOPHEN 650 MG: 325 TABLET, FILM COATED ORAL at 14:28

## 2020-12-07 RX ADMIN — ROSUVASTATIN CALCIUM 20 MG: 20 TABLET, COATED ORAL at 21:50

## 2020-12-07 RX ADMIN — POTASSIUM CHLORIDE 10 MEQ: 750 TABLET, EXTENDED RELEASE ORAL at 09:10

## 2020-12-07 RX ADMIN — CARVEDILOL 3.12 MG: 3.12 TABLET, FILM COATED ORAL at 09:09

## 2020-12-07 RX ADMIN — Medication 1 AMPULE: at 21:50

## 2020-12-07 RX ADMIN — GUAIFENESIN 600 MG: 600 TABLET ORAL at 09:09

## 2020-12-07 RX ADMIN — AMIODARONE HYDROCHLORIDE 200 MG: 200 TABLET ORAL at 17:22

## 2020-12-07 RX ADMIN — BISACODYL 5 MG: 5 TABLET, COATED ORAL at 18:00

## 2020-12-07 RX ADMIN — SODIUM CHLORIDE 75 ML/HR: 900 INJECTION, SOLUTION INTRAVENOUS at 11:08

## 2020-12-07 RX ADMIN — AMIODARONE HYDROCHLORIDE 200 MG: 200 TABLET ORAL at 09:09

## 2020-12-07 RX ADMIN — PANTOPRAZOLE SODIUM 40 MG: 40 TABLET, DELAYED RELEASE ORAL at 06:18

## 2020-12-07 RX ADMIN — SENNOSIDES AND DOCUSATE SODIUM 2 TABLET: 8.6; 5 TABLET ORAL at 09:09

## 2020-12-07 RX ADMIN — Medication 1 AMPULE: at 09:10

## 2020-12-07 RX ADMIN — GUAIFENESIN 600 MG: 600 TABLET ORAL at 21:50

## 2020-12-07 RX ADMIN — ASPIRIN 81 MG: 81 TABLET ORAL at 09:09

## 2020-12-07 NOTE — PROGRESS NOTES
Problem: Mobility Impaired (Adult and Pediatric)  Goal: *Acute Goals and Plan of Care (Insert Text)  Outcome: Progressing Towards Goal  Note: LTG:  (1.)Ms. Zepeda will move from supine to sit and sit to supine , scoot up and down, and roll side to side with INDEPENDENT within 7 treatment day(s). (2.)Ms. Zepeda will transfer from bed to chair and chair to bed with INDEPENDENT using the least restrictive device within 7 treatment day(s). (3.)Ms. Zepeda will ambulate with SUPERVISION for 500+ feet with the least restrictive device within 7 treatment day(s) while maintaining normal vital signs. (4.)Ms. Zepeda will perform 1-2 steps with CGA within 7 treatment days for safety ascending and descending stairs for home.   _________________________________________________________________________________________      PHYSICAL THERAPY: Daily Note and PM 12/7/2020  INPATIENT: PT Visit Days : 3  Pay[place] / Plan[place] PART A AND B / Product Type: Medicare /       NAME/AGE/GENDER: Juan Diego is a 67 y.o. female   PRIMARY DIAGNOSIS: Chest pain [R07.9] S/P CABG x 3 S/P CABG x 3  Procedure(s) (LRB):  CORONARY ARTERY BYPASS GRAFT (CABG X3) [place] / CLIPPING OF LEFT ATRIAL APPENDAGE (N/A)  VEIN HARVEST ENDOSCOPIC (Left)  3 Days Post-Op  ICD-10: Treatment Diagnosis:    · Difficulty in walking, Not elsewhere classified (R26.2)   Precaution/Allergies:  Toprol xl [metoprolol succinate]      ASSESSMENT:     Ms. Goldy Eastman presents with decreased bed mobility, transfers, ambulation, balance, activity tolerance, and overall general functional mobility s/p hospital admission with CABG x 3 on 12/4/20. Pt history significant for multiple myeloma. Pt A & O x 3 this date, 2 L O2 via n.c, chest tube, coleman. Pt reports lives with spouse in 1 story home, 1 step to enter. Pt states independent at baseline, drives, no falls. Pt in chair on contact. CGA for sit to stand transfers.  Pt with fair static and dynamic standing balance with RW. She stood and was able to amb 220' w/ RW. She took rest breaks along the way several times. She returned to chair in room. PT to cont to follow for acute care needs to address deficits noted above. This section established at most recent assessment   PROBLEM LIST (Impairments causing functional limitations):  1. Decreased ADL/Functional Activities  2. Decreased Transfer Abilities  3. Decreased Ambulation Ability/Technique  4. Decreased Balance  5. Decreased Activity Tolerance  6. Increased Fatigue  7. Decreased [place] with Home Exercise Program   INTERVENTIONS PLANNED: (Benefits and precautions of physical therapy have been discussed with the patient.)  1. Balance Exercise  2. Bed Mobility  3. Family Education  4. Gait Training  5. Home Exercise Program (HEP)  6. Therapeutic Activites  7. Therapeutic Exercise/Strengthening  8. Transfer Training     TREATMENT PLAN: Frequency/Duration: twice daily for duration of hospital stay  Rehabilitation Potential For Stated Goals: Good     REHAB RECOMMENDATIONS (at time of discharge pending progress):    Placement: It is my opinion, based on this patient's performance to date, that Ms. Zepeda may benefit from [address] after discharge due to the functional deficits listed above that are likely to improve with skilled rehabilitation because he/she has multiple medical issues that affect his/her functional mobility in the community. Equipment:    To be determined-BSC and possible RW              HISTORY:   History of Present Injury/Illness (Reason for Referral):  67year old known female patient with CAD and NSTEMI with plans for intervention by Dr. Roxi Moore, chronic TAYLOR with history of small bowel AVMs. and smoldering multiple myeloma who we are following for anemia and bleeding. Hgb 7.2 on 12/1 despite PRBCs. Has 2 more units scheduled. Plan for EGD with push enteroscopy today prior to CABG.  Cardiology clearance obtained by  Ricthie. Past Medical History/Comorbidities:   Ms. Elle Young  has a past medical history of Anemia, Mcneil's esophagus, Coronary atherosclerosis of native coronary vessel, GERD (gastroesophageal reflux disease), HLD (hyperlipidemia), Hypercholesterolemia, Hypertension, Mild aortic stenosis, Occlusion and stenosis of carotid artery without mention of cerebral infarction, Osteoarthritis, PAD (peripheral artery disease) ([address] PONV (postoperative nausea and vomiting), S/P insertion of iliac artery stent, and Shingles. Ms. Elle Young  has a past surgical history that includes hx angioplasty; hx endoscopy; hx colonoscopy; hx appendectomy; hx tonsillectomy; hx orthopaedic; hx hysterectomy; vascular surgery procedure unlist (Left, 2006); hx carotid endarterectomy (Left, 03/30/2017); and hx carotid endarterectomy (Right, 05/15/2017). 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 Overall Behavior:  agreeable   Number of Personal Factors/Comorbidities that affect the Plan of Care: Anemia, age, CAD 3+: HIGH COMPLEXITY   EXAMINATION:   Most Recent Physical Functioning:   Gross Assessment:                  Posture:  Posture Assessment: Rounded shoulders  Balance:  Sitting: Intact  Standing: Impaired  Standing - Static: Fair  Standing - Dynamic : Fair Bed Mobility:     Wheelchair Mobility:     Transfers:  Sit to Stand: Minimum assistance  Stand to Sit: Contact guard assistance  Gait:     Base of Support: Narrowed  Speed/Sarika: Shuffled; Slow  Step Length: Left shortened;Right shortened  Distance (ft): 250 Feet (ft)  Assistive Device: Walker, rolling  Ambulation - Level of Assistance: Contact guard assistance  Interventions: Safety awareness training;Verbal cues      Body Structures Involved:  1. Heart  2. Lungs  3. Muscles Body Functions Affected:  1. Cardio  2. Respiratory  3. Movement Related Activities and Participation Affected:  1. General Tasks and Demands  2. Mobility  3. Self Care   Number of elements that affect the Plan of Care: 4+: HIGH COMPLEXITY   CLINICAL PRESENTATION:   Presentation: Evolving clinical presentation with changing clinical characteristics: MODERATE COMPLEXITY   CLINICAL DECISION MAKIN [facility] Inpatient Short Form  How much difficulty does the patient currently have. .. Unable A Lot A Little None   1. Turning over in bed (including adjusting bedclothes, sheets and blankets)? [] 1   [x] 2   [] 3   [] 4   2. Sitting down on and standing up from a chair with arms ( e.g., wheelchair, bedside commode, etc.)   [] 1   [x] 2   [] 3   [] 4   3. Moving from lying on back to sitting on the side of the bed? [] 1   [x] 2   [] 3   [] 4   How much help from another person does the patient currently need. .. Total A Lot A Little None   4. Moving to and from a bed to a chair (including a wheelchair)? [] 1   [] 2   [x] 3   [] 4   5. Need to walk in hospital room? [] 1   [] 2   [x] 3   [] 4   6. Climbing 3-5 steps with a railing? [] 1   [x] 2   [] 3   [] 4   © , Trustees of [address] [place], [place] [id number], under license to [employer]. All rights reserved      Score:  Initial: 14 Most Recent: X (Date: -- )    Interpretation of Tool:  Represents activities that are increasingly more difficult (i.e. Bed mobility, Transfers, Gait).     Medical Necessity:     · Patient is expected to demonstrate progress in   · strength, range of motion, balance, and coordination  ·  to   · decrease assistance required with overall functional mobility, transfers, ambulation  · .  · Patient demonstrates   · good  ·  rehab potential due to higher previous functional level. Reason for Services/Other Comments:  · Patient   · continues to require present interventions due to patient's inability to perform bed mobility, transfers, ambulation  · . Use of outcome tool(s) and clinical judgement create a POC that gives a: Clear prediction of patient's progress: LOW COMPLEXITY            TREATMENT:   (In addition to Assessment/Re-Assessment sessions the following treatments were rendered)   Pre-treatment Symptoms/Complaints:  \"I'm very tired. \"  Pain: Initial:     0 Post Session:  0     Therapeutic Activity: (    12 min): Therapeutic activities including Chair transfers, LE ex, Ambulation on level ground and weight shifting, walker safety, posture, pursed lip breathing to improve mobility, strength, balance and coordination. Required minimal Safety awareness training;Verbal cues to promote static and dynamic balance in standing and promote coordination of bilateral, lower extremity(s). Braces/Orthotics/Lines/Etc:   · roon air  Treatment/Session Assessment:    · Response to Treatment:  tolerated well  · Interdisciplinary Collaboration:   o Physical Therapy Assistant  o Registered Nurse  · After treatment position/precautions:   o Up in chair  o Bed alarm/tab alert on  o Bed/Chair-wheels locked  o Bed in low position  o Call light within reach  o RN notified   · Compliance with Program/Exercises: Will assess as treatment progresses  · Recommendations/Intent for next treatment session: \"Next visit will focus on advancements to more challenging activities\".   Total Treatment Duration:  PT Patient Time In/Time Out  Time In: 1538  Time Out: [facility]

## 2020-12-07 NOTE — PROGRESS NOTES
Comprehensive Nutrition Assessment    Type and Reason for Visit: Initial, RD nutrition re-screen/LOS    Nutrition Recommendations/Plan:    Continue current diet   Add Ensure Enlive TID     Malnutrition Assessment:  Malnutrition Status: At risk for malnutrition (specify)(poor po intake s/p CABG x3 on 12/4)  Context: Acute illness  Nutrition Assessment:   Nutrition History: Pt reports eating well and denies unintentional wt loss PTA. Nutrition Background: Pt presents wtih NSTEMI s/p CABG x3 12/4. PMH notable for CAD, HTN, HLD, GERD, PAD, CKD III, ARIEL (s/p right and left CEA 2017), OA, and multiple myeloma. Daily Update:  Pt reports eating <25% of meals since having surgery. Noted 10 recorded intakes 12/1-12/7 averaging 19% intake per meal. She tried 1 Glucerna since having surgery, but reports milk makes her have more mucus. Informed pt Ensure [employer] is not a milk product and educated about importance of adequate po intake s/p surgery.  Pt is agreeable to trying Ensure Enlive for evening meal.    Current Nutrition Therapies:  DIET CARDIAC Regular    Current Intake:   Average Meal Intake: 1-25% Average Supplement Intake: None ordered      Anthropometric Measures:  Height: 5' 2.99\" (160 cm)  Current Body Wt: 81.6 kg (179 lb 14.3 oz)(12/7), Weight source: Standing scale  BMI: 31.9, Obese class 1 (BMI 30.0-34.9)     Ideal Body Wt: 115 lbs (52 kg), 156.4 %          Estimated Daily Nutrient Needs:  Energy (kcal): 8521-8787(01-34) (Kcal/kg, Weight Used: Ideal(52.3kg))  Protein (g): 52-65(1-1.25gm/kg) Weight Used: (Ideal(52.3kg))    Nutrition Diagnosis:   · Inadequate oral intake related to (loss of appetite) as evidenced by (s/p CABG x3, pt stated barrier to po intake)    Nutrition Interventions:   Food and/or Nutrient Delivery: Continue current diet, Start oral nutrition supplement     Coordination of Nutrition Care: Continue to monitor while inpatient    Goals:      Meet >75% estimated nutrition needs within 7 days    Nutrition Monitoring and Evaluation:      Food/Nutrient Intake Outcomes: Food and nutrient intake, Supplement intake       Discharge Planning:     Too soon to determine    Electronically signed by Lopez Velez MS, BACILION, LD 12/7/2020 at 3:23 PM  Contact: [phone]

## 2020-12-07 NOTE — PROGRESS NOTES
Problem: Falls - Risk of  Goal: *Absence of Falls  Description: Document Ray Fonseca Fall Risk and appropriate interventions in the flowsheet. Outcome: Progressing Towards Goal  Note: Fall Risk Interventions:  Mobility Interventions: Bed/chair exit alarm, Patient to call before getting OOB, PT Consult for mobility concerns         Medication Interventions: Bed/chair exit alarm, Patient to call before getting OOB, Teach patient to arise slowly    Elimination Interventions: Bed/chair exit alarm, Call light in reach, Stay With Me (per policy), Patient to call for help with toileting needs    History of Falls Interventions: Bed/chair exit alarm, Door open when patient unattended, Room close to nurse's station         Problem: Patient Education: Go to Patient Education Activity  Goal: Patient/Family Education  Outcome: Progressing Towards Goal     Problem: Pressure Injury - Risk of  Goal: *Prevention of pressure injury  Description: Document Tyrell Scale and appropriate interventions in the flowsheet.   Outcome: Progressing Towards Goal  Note: Pressure Injury Interventions:  Sensory Interventions: Assess changes in LOC, Avoid rigorous massage over bony prominences, Check visual cues for pain, Float heels, Keep linens dry and wrinkle-free, Maintain/enhance activity level, Minimize linen layers, Pressure redistribution bed/mattress (bed type)    Moisture Interventions: Absorbent underpads, Apply protective barrier, creams and emollients, Maintain skin hydration (lotion/cream), Limit adult briefs, Minimize layers    Activity Interventions: Chair cushion, Pressure redistribution bed/mattress(bed type), Increase time out of bed    Mobility Interventions: Chair cushion, Float heels, HOB 30 degrees or less, Pressure redistribution bed/mattress (bed type), PT/OT evaluation    Nutrition Interventions: Document food/fluid/supplement intake, Offer support with meals,snacks and hydration    Friction and Shear Interventions: Lift sheet                Problem: Patient Education: Go to Patient Education Activity  Goal: Patient/Family Education  Outcome: Progressing Towards Goal     Problem: Patient Education: Go to Patient Education Activity  Goal: Patient/Family Education  Outcome: Progressing Towards Goal

## 2020-12-07 NOTE — PROGRESS NOTES
Today's Date: 12/7/2020  Date of Admission: 11/29/2020    Chart Reviewed. Subjective:     Patient feels poorly. She feels weak and complains of chest soreness. She has intermittent nausea. Medications Reviewed. Objective:     Vitals:    12/07/20 0022 12/07/20 0320 12/07/20 0612 12/07/20 0711   BP:  131/62  (!) 153/67   Pulse:  68  75   Resp: 19 16  16   Temp:  97.7 °F (36.5 °C)  98.1 °F (36.7 °C)   SpO2:  92%  92%   Weight:   180 lb (81.6 kg)    Height:           Intake and Output  Current Shift: 12/07 0701 - 12/07 1900  In: 120 [P.O.:120]  Out: 225 [Urine:225]   Last 3 Shifts: 12/05 1901 - 12/07 0700  In: 960 [P.O.:960]  Out: 735 [Urine:700]    Physical Exam:  General: Well Developed, Well Nourished, No Acute Distress, Alert & Oriented x 3, Appropriate mood  Neck: supple, no JVD  Heart: S1S2 with RRR without murmurs or gallops  Lungs: mostly clear throughout auscultation bilaterally  Abd: soft, nontender, nondistended, with good bowel sounds  Ext: no edema bilaterally  Sternal incision: clean, dry, and intact  Skin: warm and dry    LABS  Data Review:   Recent Labs     12/07/20  0333 12/06/20  0336 12/05/20  0312  12/04/20  1258   * 128* 141  --  143   K 4.1 4.5 4.0   < > 3.5   MG 2.0 2.3 2.0   < > 2.5*   BUN 35* 29* 15  --  16   CREA 2.20* 2.15* 0.96  --  1.21*   * 147* 90  --  140*   WBC  --  15.0* 8.9  --  14.9*   HGB  --  10.3* 10.2*   < > 11.5*   HCT  --  33.0* 31.0*   < > 35.2*   PLT  --  164 112*  --  150   INR  --   --   --   --  1.4    < > = values in this interval not displayed. Estimated Creatinine Clearance: 22 mL/min (A) (based on SCr of 2.2 mg/dL (H)).       Assessment/Plan:     Principal Problem:    S/P CABG x 3 (12/4/2020)  On ASA, BB, statin, lasix held due to DIETER, no ACE-I/ARB, pt reports little oral intake, hyponatremia worse, will start IV fluids and monitor, continue PT    Active Problems:    Hypertension ()  BP elevated, will allow higher BP for better renal perfusion      CKD (chronic kidney disease), stage III (12/10/2013)  Renal function worse, will start IV fluids      Dyslipidemia (12/10/2013)  statin      Iron deficiency anemia (3/21/2017)  Hgb stable       Coronary atherosclerosis of native coronary vessel (3/21/2017)    S/p CABG       Nonrheumatic aortic valve stenosis (10/15/2020)  Mild       NSTEMI (non-ST elevated myocardial infarction) ([facility] (11/29/2020)  S/p CABG      Hypomagnesemia (11/29/2020)  Resolved       Syncope and collapse (11/29/2020)  Likely due to worsening anemia, no arrhythmias noted on telemetry       Encounter for weaning from ventilator ([facility] [address] (12/4/2020)        Hypoxia (12/4/2020)  Resolved, stable on room air         Sharla Raza PA-C

## 2020-12-07 NOTE — PROGRESS NOTES
Problem: Mobility Impaired (Adult and Pediatric)  Goal: *Acute Goals and Plan of Care (Insert Text)  Outcome: Progressing Towards Goal  Note: LTG:  (1.)Ms. Zepeda will move from supine to sit and sit to supine , scoot up and down, and roll side to side with INDEPENDENT within 7 treatment day(s). (2.)Ms. Zepeda will transfer from bed to chair and chair to bed with INDEPENDENT using the least restrictive device within 7 treatment day(s). (3.)Ms. Zepeda will ambulate with SUPERVISION for 500+ feet with the least restrictive device within 7 treatment day(s) while maintaining normal vital signs. (4.)Ms. Zepeda will perform 1-2 steps with CGA within 7 treatment days for safety ascending and descending stairs for home.   _________________________________________________________________________________________      PHYSICAL THERAPY: Daily Note and AM 12/7/2020  INPATIENT: PT Visit Days : 3  [place] / Plan[place] B / Product Type: Medicare /       NAME/AGE/GENDER: Ben Shanks is a 67 y.o. female   PRIMARY DIAGNOSIS: Chest pain [R07.9] S/P CABG x 3 S/P CABG x 3  Procedure(s) (LRB):  CORONARY ARTERY BYPASS GRAFT (CABG X3) [place] / CLIPPING OF LEFT ATRIAL APPENDAGE (N/A)  VEIN HARVEST ENDOSCOPIC (Left)  3 Days Post-Op  ICD-10: Treatment Diagnosis:    · Difficulty in walking, Not elsewhere classified (R26.2)   Precaution/Allergies:  Toprol xl [metoprolol succinate]      ASSESSMENT:     Ms. Mango Khan presents with decreased bed mobility, transfers, ambulation, balance, activity tolerance, and overall general functional mobility s/p hospital admission with CABG x 3 on 12/4/20. Pt history significant for multiple myeloma. Pt A & O x 3 this date, 2 L O2 via n.c, chest tube, coleman. Pt reports lives with spouse in 1 story home, 1 step to enter. Pt states independent at baseline, drives, no falls. Pt in chair on contact. CGA for sit to stand transfers.  Pt with fair static and dynamic standing balance with RW. She stood and was able to amb 220' w/ RW. She took rest breaks along the way several times. She returned to chair in room and after a short rest she performed B LE ex as listed below. PT to cont to follow for acute care needs to address deficits noted above. Discharge needs pending progress with mobility.          This section established at most recent assessment   PROBLEM LIST (Impairments causing functional limitations):  1. Decreased ADL/Functional Activities  2. Decreased Transfer Abilities  3. Decreased Ambulation Ability/Technique  4. Decreased Balance  5. Decreased Activity Tolerance  6. Increased Fatigue  7. Decreased [place] with Home Exercise Program   INTERVENTIONS PLANNED: (Benefits and precautions of physical therapy have been discussed with the patient.)  1. Balance Exercise  2. Bed Mobility  3. Family Education  4. Gait Training  5. Home Exercise Program (HEP)  6. Therapeutic Activites  7. Therapeutic Exercise/Strengthening  8. Transfer Training     TREATMENT PLAN: Frequency/Duration: twice daily for duration of hospital stay  Rehabilitation Potential For Stated Goals: Good     REHAB RECOMMENDATIONS (at time of discharge pending progress):    Placement: It is my opinion, based on this patient's performance to date, that Ms. Zepeda may benefit from [address] after discharge due to the functional deficits listed above that are likely to improve with skilled rehabilitation because he/she has multiple medical issues that affect his/her functional mobility in the community. Equipment:    To be determined-BSC and possible RW              HISTORY:   History of Present Injury/Illness (Reason for Referral):  67year old known female patient with CAD and NSTEMI with plans for intervention by Dr. Reed, chronic TAYLOR with history of small bowel AVMs. and smoldering multiple myeloma who we are following for anemia and bleeding. Hgb 7.2 on 12/1 despite PRBCs.  Has 2 more units scheduled. Plan for EGD with push enteroscopy today prior to CABG. Cardiology clearance obtained by Dr. Roxi Moore. Past Medical History/Comorbidities:   Ms. Shara Almazan  has a past medical history of Anemia, Mcneil's esophagus, Coronary atherosclerosis of native coronary vessel, GERD (gastroesophageal reflux disease), HLD (hyperlipidemia), Hypercholesterolemia, Hypertension, Mild aortic stenosis, Occlusion and stenosis of carotid artery without mention of cerebral infarction, Osteoarthritis, PAD (peripheral artery disease) ([address] PONV (postoperative nausea and vomiting), S/P insertion of iliac artery stent, and Shingles. Ms. hSara Almazan  has a past surgical history that includes hx angioplasty; hx endoscopy; hx colonoscopy; hx appendectomy; hx tonsillectomy; hx orthopaedic; hx hysterectomy; vascular surgery procedure unlist (Left, 2006); hx carotid endarterectomy (Left, 03/30/2017); and hx carotid endarterectomy (Right, 05/15/2017). 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 Overall Behavior:  agreeable   Number of Personal Factors/Comorbidities that affect the Plan of Care:   Anemia, age, CAD 3+: HIGH COMPLEXITY   EXAMINATION:   Most Recent Physical Functioning:   Gross Assessment:                  Posture:  Posture Assessment: Rounded shoulders  Balance:  Sitting: Intact  Standing: Impaired  Standing - Static: Fair  Standing - Dynamic : Fair Bed Mobility:     Wheelchair Mobility:     Transfers:  Sit to Stand: Minimum assistance  Stand to Sit: Contact guard assistance  Gait:     Base of Support: Narrowed  Speed/Sarika: Shuffled; Slow  Step Length: Left shortened;Right shortened  Distance (ft): 250 Feet (ft)  Assistive Device: Walker, rolling  Ambulation - Level of Assistance: Contact guard assistance  Interventions: Safety awareness training;Verbal cues      Body Structures Involved:  1. Heart  2. Lungs  3. Muscles Body Functions Affected:  1. Cardio  2. Respiratory  3. Movement Related Activities and Participation Affected:  1. General Tasks and Demands  2. Mobility  3. Self Care   Number of elements that affect the Plan of Care: 4+: HIGH COMPLEXITY   CLINICAL PRESENTATION:   Presentation: Evolving clinical presentation with changing clinical characteristics: MODERATE COMPLEXITY   CLINICAL DECISION MAKIN [facility] Inpatient Short Form  How much difficulty does the patient currently have. .. Unable A Lot A Little None   1. Turning over in bed (including adjusting bedclothes, sheets and blankets)? [] 1   [x] 2   [] 3   [] 4   2. Sitting down on and standing up from a chair with arms ( e.g., wheelchair, bedside commode, etc.)   [] 1   [x] 2   [] 3   [] 4   3. Moving from lying on back to sitting on the side of the bed? [] 1   [x] 2   [] 3   [] 4   How much help from another person does the patient currently need. .. Total A Lot A Little None   4. Moving to and from a bed to a chair (including a wheelchair)? [] 1   [] 2   [x] 3   [] 4   5. Need to walk in hospital room? [] 1   [] 2   [x] 3   [] 4   6. Climbing 3-5 steps with a railing? [] 1   [x] 2   [] 3   [] 4   © , Trustees of [facility] [employer], under license to [employer]. All rights reserved      Score:  Initial: 14 Most Recent: X (Date: -- )    Interpretation of Tool:  Represents activities that are increasingly more difficult (i.e. Bed mobility, Transfers, Gait).     Medical Necessity:     · Patient is expected to demonstrate progress in   · strength, range of motion, balance, and coordination  ·  to   · decrease assistance required with overall functional mobility, transfers, ambulation  · .  · Patient demonstrates   · good  ·  rehab potential due to higher previous functional level. Reason for Services/Other Comments:  · Patient   · continues to require present interventions due to patient's inability to perform bed mobility, transfers, ambulation  · . Use of outcome tool(s) and clinical judgement create a POC that gives a: Clear prediction of patient's progress: LOW COMPLEXITY            TREATMENT:   (In addition to Assessment/Re-Assessment sessions the following treatments were rendered)   Pre-treatment Symptoms/Complaints:  \"I feel better this morning. \"  Pain: Initial:     0 Post Session:  0     Therapeutic Activity: (    24 min): Therapeutic activities including Chair transfers, LE ex, Ambulation on level ground and weight shifting, walker safety, posture, pursed lip breathing to improve mobility, strength, balance and coordination. Required minimal Safety awareness training;Verbal cues to promote static and dynamic balance in standing and promote coordination of bilateral, lower extremity(s). Braces/Orthotics/Lines/Etc:   · roon air  Treatment/Session Assessment:    · Response to Treatment:  tolerated well  · Interdisciplinary Collaboration:   o Physical Therapy Assistant  o Registered Nurse  · After treatment position/precautions:   o Up in chair  o Bed alarm/tab alert on  o Bed/Chair-wheels locked  o Bed in low position  o Call light within reach  o RN notified   · Compliance with Program/Exercises: Will assess as treatment progresses  · Recommendations/Intent for next treatment session: \"Next visit will focus on advancements to more challenging activities\".   Total Treatment Duration:  PT Patient Time In/Time Out  Time In: 0909  Time Out: [facility]

## 2020-12-07 NOTE — ROUTINE PROCESS
Cardiac Rehab: Spoke with patient regarding referral to cardiac rehab. Patient meets admission criteria based on CABG x3 (12/4/20). Written information about Cardiac Rehab given and reviewed with patient. Discussed lifestyle modifications to promote cardiac wellness. Patient indicated that she wants to participate in the cardiac rehab program and her orientation has been scheduled. Her Cardiologist is Dr. Kobe Holcomb.       Thank you,  HARSHA Edward, RN  Cardiopulmonary Rehabilitation Nurse Liaison  [facility]

## 2020-12-07 NOTE — PROGRESS NOTES
Bedside shift change report given to Maria Luisa Chao (oncoming nurse) by Win Leija (offgoing nurse).

## 2020-12-07 NOTE — PROGRESS NOTES
Bedside and Verbal shift change report given to self (oncoming nurse) by Noemi Hernandez (offgoing nurse). Report included the following information SBAR and Kardex.

## 2020-12-07 NOTE — PROGRESS NOTES
[facility] CARDIOLOGY PROGRESS NOTE           12/7/2020 7:04 AM    Admit Date: 11/29/2020      Subjective:   Patient without dyspnea. Sternotomy pain controlled. Hemodynamics stable. REnal function stable overnight. Mild worsening of hyponatremia. ROS:  Cardiovascular:  As noted above    Objective:      Vitals:    12/06/20 2302 12/07/20 0022 12/07/20 0320 12/07/20 0612   BP: (!) 147/65  131/62    Pulse: 69  68    Resp: 22 19 16    Temp: 97.8 °F (36.6 °C)  97.7 °F (36.5 °C)    SpO2: 91%  92%    Weight:    180 lb (81.6 kg)   Height:           Physical Exam:  General-No Acute Distress  Neck- supple, no JVD  CV- regular rate and rhythm no MRG  Lung- clear bilaterally  Abd- soft, nontender, nondistended  Ext- no edema bilaterally. Skin- warm and dry      Data Review:   Recent Labs     12/07/20  0333 12/06/20  0336 12/05/20  0312  12/04/20  1258   * 128* 141  --  143   K 4.1 4.5 4.0   < > 3.5   MG 2.0 2.3 2.0   < > 2.5*   BUN 35* 29* 15  --  16   CREA 2.20* 2.15* 0.96  --  1.21*   * 147* 90  --  140*   WBC  --  15.0* 8.9  --  14.9*   HGB  --  10.3* 10.2*   < > 11.5*   HCT  --  33.0* 31.0*   < > 35.2*   PLT  --  164 112*  --  150   INR  --   --   --   --  1.4    < > = values in this interval not displayed. No results found for: Dedrajodi Reyes, NGAOC     Echo (11/29/20): -  Left ventricle: Systolic function was mildly to moderately reduced. Ejection fraction was estimated in the range of 40 % to 45 %. There was hypokinesis ofthe basal-mid inferolateral and apical wall(s).    -  Aortic valve: The findings were most consistent with mild aortic stenosis.   -  Mitral valve: There was mild annular calcification.   -  Tricuspid valve: There was mild regurgitation. Assessment/Plan:     Principal Problem:    NSTEMI (non-ST elevated myocardial infarction) ([facility] (11/29/2020)    S/P multivessel CABG. Continue medical therapy. Monitor on telemetry.       Active Problems: Hypertension ()    BP acceptable. Avoid aggressive reduction with acute kidney injury. CKD (chronic kidney disease), stage III (12/10/2013)    Worsening but hopefully stabilizing. Daily BMP. Dyslipidemia (12/10/2013)    Continue crestor. Iron deficiency anemia (3/21/2017)    Stable. Coronary atherosclerosis of native coronary vessel (3/21/2017)    See above. Nonrheumatic aortic valve stenosis (10/15/2020)    Mild. Follow as outpatient.                    Shalom Judd MD  12/7/2020 7:04 AM

## 2020-12-08 LAB
ANION GAP SERPL CALC-SCNC: 7 MMOL/L (ref 7–16)
BUN SERPL-MCNC: 31 MG/DL (ref 8–23)
CALCIUM SERPL-MCNC: 8.7 MG/DL (ref 8.3–10.4)
CHLORIDE SERPL-SCNC: 104 MMOL/L (ref 98–107)
CO2 SERPL-SCNC: 19 MMOL/L (ref 21–32)
CREAT SERPL-MCNC: 1.64 MG/DL (ref 0.6–1)
ERYTHROCYTE [DISTWIDTH] IN BLOOD BY AUTOMATED COUNT: 15.1 % (ref 11.9–14.6)
GLUCOSE SERPL-MCNC: 109 MG/DL (ref 65–100)
HCT VFR BLD AUTO: 30.5 % (ref 35.8–46.3)
HGB BLD-MCNC: 10.3 G/DL (ref 11.7–15.4)
MAGNESIUM SERPL-MCNC: 2.1 MG/DL (ref 1.8–2.4)
MCH RBC QN AUTO: 28.6 PG (ref 26.1–32.9)
MCHC RBC AUTO-ENTMCNC: 33.8 G/DL (ref 31.4–35)
MCV RBC AUTO: 84.7 FL (ref 79.6–97.8)
NRBC # BLD: 0 K/UL (ref 0–0.2)
PLATELET # BLD AUTO: 185 K/UL (ref 150–450)
PMV BLD AUTO: 10.6 FL (ref 9.4–12.3)
POTASSIUM SERPL-SCNC: 3.9 MMOL/L (ref 3.5–5.1)
RBC # BLD AUTO: 3.6 M/UL (ref 4.05–5.2)
SODIUM SERPL-SCNC: 130 MMOL/L (ref 136–145)
WBC # BLD AUTO: 8.2 K/UL (ref 4.3–11.1)

## 2020-12-08 PROCEDURE — 85027 COMPLETE CBC AUTOMATED: CPT

## 2020-12-08 PROCEDURE — 74011250637 HC RX REV CODE- 250/637: Performed by: THORACIC SURGERY (CARDIOTHORACIC VASCULAR SURGERY)

## 2020-12-08 PROCEDURE — 97110 THERAPEUTIC EXERCISES: CPT

## 2020-12-08 PROCEDURE — 77030027138 HC INCENT SPIROMETER -A

## 2020-12-08 PROCEDURE — 77030012890

## 2020-12-08 PROCEDURE — 99232 SBSQ HOSP IP/OBS MODERATE 35: CPT | Performed by: INTERNAL MEDICINE

## 2020-12-08 PROCEDURE — 65660000004 HC RM CVT STEPDOWN

## 2020-12-08 PROCEDURE — 36415 COLL VENOUS BLD VENIPUNCTURE: CPT

## 2020-12-08 PROCEDURE — 2709999900 HC NON-CHARGEABLE SUPPLY

## 2020-12-08 PROCEDURE — 80048 BASIC METABOLIC PNL TOTAL CA: CPT

## 2020-12-08 PROCEDURE — 74011250636 HC RX REV CODE- 250/636: Performed by: PHYSICIAN ASSISTANT

## 2020-12-08 PROCEDURE — 97530 THERAPEUTIC ACTIVITIES: CPT

## 2020-12-08 PROCEDURE — 83735 ASSAY OF MAGNESIUM: CPT

## 2020-12-08 RX ADMIN — ASPIRIN 81 MG: 81 TABLET ORAL at 08:09

## 2020-12-08 RX ADMIN — AMIODARONE HYDROCHLORIDE 200 MG: 200 TABLET ORAL at 21:54

## 2020-12-08 RX ADMIN — ACETAMINOPHEN 650 MG: 325 TABLET, FILM COATED ORAL at 08:09

## 2020-12-08 RX ADMIN — TRAMADOL HYDROCHLORIDE 50 MG: 50 TABLET ORAL at 04:24

## 2020-12-08 RX ADMIN — GUAIFENESIN 600 MG: 600 TABLET ORAL at 08:10

## 2020-12-08 RX ADMIN — GUAIFENESIN 600 MG: 600 TABLET ORAL at 21:54

## 2020-12-08 RX ADMIN — AMIODARONE HYDROCHLORIDE 200 MG: 200 TABLET ORAL at 08:09

## 2020-12-08 RX ADMIN — Medication 10 ML: at 21:58

## 2020-12-08 RX ADMIN — SODIUM CHLORIDE 75 ML/HR: 900 INJECTION, SOLUTION INTRAVENOUS at 04:26

## 2020-12-08 RX ADMIN — Medication 1 AMPULE: at 21:54

## 2020-12-08 RX ADMIN — CARVEDILOL 3.12 MG: 3.12 TABLET, FILM COATED ORAL at 21:54

## 2020-12-08 RX ADMIN — ACETAMINOPHEN 650 MG: 325 TABLET, FILM COATED ORAL at 21:54

## 2020-12-08 RX ADMIN — POTASSIUM CHLORIDE 20 MEQ: 20 TABLET, EXTENDED RELEASE ORAL at 06:31

## 2020-12-08 RX ADMIN — POTASSIUM CHLORIDE 20 MEQ: 20 TABLET, EXTENDED RELEASE ORAL at 21:54

## 2020-12-08 RX ADMIN — Medication 10 ML: at 04:26

## 2020-12-08 RX ADMIN — ROSUVASTATIN CALCIUM 20 MG: 20 TABLET, COATED ORAL at 21:54

## 2020-12-08 RX ADMIN — Medication 1 AMPULE: at 08:10

## 2020-12-08 RX ADMIN — ACETAMINOPHEN 650 MG: 325 TABLET, FILM COATED ORAL at 14:31

## 2020-12-08 RX ADMIN — PANTOPRAZOLE SODIUM 40 MG: 40 TABLET, DELAYED RELEASE ORAL at 06:31

## 2020-12-08 RX ADMIN — CARVEDILOL 3.12 MG: 3.12 TABLET, FILM COATED ORAL at 08:09

## 2020-12-08 NOTE — PROGRESS NOTES
[facility] CARDIOLOGY PROGRESS NOTE           12/8/2020 7:04 AM    Admit Date: 11/29/2020      Subjective:   Patient without dyspnea. On room air. Soreness at sternotomy site. Hemodynamics stable. REnal function and hyponatremia improved. ROS:  Cardiovascular:  As noted above    Objective:      Vitals:    12/07/20 1949 12/07/20 2251 12/08/20 0328 12/08/20 0424   BP: 131/61 (!) 128/57 134/62    Pulse: 70 72 72    Resp: 18 18 18    Temp: 97.8 °F (36.6 °C) 98.1 °F (36.7 °C) 98.8 °F (37.1 °C)    SpO2: 95% 96% 97%    Weight:    178 lb 9.6 oz (81 kg)   Height:           Physical Exam:  General-No Acute Distress  Neck- supple, no JVD  CV- regular rate and rhythm no MRG  Lung- clear bilaterally  Abd- soft, nontender, nondistended  Ext- no edema bilaterally. Skin- warm and dry      Data Review:   Recent Labs     12/08/20  0406 12/07/20  0333 12/06/20  0336   * 125* 128*   K 3.9 4.1 4.5   MG 2.1 2.0 2.3   BUN 31* 35* 29*   CREA 1.64* 2.20* 2.15*   * 142* 147*   WBC 8.2  --  15.0*   HGB 10.3*  --  10.3*   HCT 30.5*  --  33.0*     --  164      No results found for: CARLY Lopez     Echo (11/29/20): -  Left ventricle: Systolic function was mildly to moderately reduced. Ejection fraction was estimated in the range of 40 % to 45 %. There was hypokinesis ofthe basal-mid inferolateral and apical wall(s).    -  Aortic valve: The findings were most consistent with mild aortic stenosis.   -  Mitral valve: There was mild annular calcification.   -  Tricuspid valve: There was mild regurgitation. Assessment/Plan:     Principal Problem:    NSTEMI (non-ST elevated myocardial infarction) ([facility] (11/29/2020)    S/P multivessel CABG. Continue medical therapy. On ASA and amiodarone. Monitor on telemetry. Active Problems:    Hypertension ()    BP acceptable. Avoid aggressive reduction with recovering acute kidney injury.         CKD (chronic kidney disease), stage III (12/10/2013)    Improved with hydration over last 24 hours. Daily BMP. Dyslipidemia (12/10/2013)    Continue crestor. Iron deficiency anemia (3/21/2017)    Stable. Coronary atherosclerosis of native coronary vessel (3/21/2017)    See above. Nonrheumatic aortic valve stenosis (10/15/2020)    Mild. Follow as outpatient.                    Jenifer Car MD  12/8/2020 7:04 AM

## 2020-12-08 NOTE — PROGRESS NOTES
Today's Date: 12/8/2020  Date of Admission: 11/29/2020    Chart Reviewed. Subjective:     Patient feels about the same. She continues to feel weak. Her cough has been more productive. She is urinating more. Appetite remains poor. Medications Reviewed. Objective:     Vitals:    12/07/20 2251 12/08/20 0328 12/08/20 0424 12/08/20 0706   BP: (!) 128/57 134/62  (!) 143/65   Pulse: 72 72  72   Resp: 18 18  18   Temp: 98.1 °F (36.7 °C) 98.8 °F (37.1 °C)  97.1 °F (36.2 °C)   SpO2: 96% 97%  97%   Weight:   178 lb 9.6 oz (81 kg)    Height:           Intake and Output  Current Shift: 12/08 0701 - 12/08 1900  In: 360 [P.O.:360]  Out: -    Last 3 Shifts: 12/06 1901 - 12/08 0700  In: 9158 [P.O.:1232; I.V.:1225]  Out: 2225 [Urine:2225]    Physical Exam:  General: Well Developed, Well Nourished, No Acute Distress, Alert & Oriented x 3, Appropriate mood  Neck: supple, no JVD  Heart: S1S2 with RRR without murmurs or gallops  Lungs: mostly clear, some scattered rhonchi that partially clears with cough  Abd: soft, nontender, nondistended, with good bowel sounds  Ext: no edema bilaterally  Sternal incision: clean, dry, and intact  Skin: warm and dry    LABS  Data Review:   Recent Labs     12/08/20  0406 12/07/20  0333 12/06/20  0336   * 125* 128*   K 3.9 4.1 4.5   MG 2.1 2.0 2.3   BUN 31* 35* 29*   CREA 1.64* 2.20* 2.15*   * 142* 147*   WBC 8.2  --  15.0*   HGB 10.3*  --  10.3*   HCT 30.5*  --  33.0*     --  164       Estimated Creatinine Clearance: 29.4 mL/min (A) (based on SCr of 1.64 mg/dL (H)).       Assessment/Plan:     Principal Problem:    S/P CABG x 3 (12/4/2020)  On ASA, BB, statin, lasix held due to DIETER, no ACE-I/ARB, renal function and hyponatremia improved, appetite remains poor, continue PT     Active Problems:    Hypertension ()  BP elevated at times, will allow higher BP for better renal perfusion       CKD (chronic kidney disease), stage III (12/10/2013)  Renal function improved, UOP improved       Dyslipidemia (12/10/2013)  statin       Iron deficiency anemia (3/21/2017)  Hgb stable        Coronary atherosclerosis of native coronary vessel (3/21/2017)    S/p CABG        Nonrheumatic aortic valve stenosis (10/15/2020)  Mild        NSTEMI (non-ST elevated myocardial infarction) ([facility] (11/29/2020)  S/p CABG       Hypomagnesemia (11/29/2020)  Resolved        Syncope and collapse (11/29/2020)  Likely due to worsening anemia, no arrhythmias noted on telemetry        Encounter for weaning from ventilator ([facility] (12/4/2020)          Hypoxia (12/4/2020)  Resolved, stable on room air         Uma Keating PA-C

## 2020-12-08 NOTE — PROGRESS NOTES
Verbal bedside report given to oncoming RNSunita. Patient's situation, background, assessment and recommendations provided. Opportunity for questions provided. Oncoming RN assumed care of patient.

## 2020-12-08 NOTE — PROGRESS NOTES
Bedside shift change report given to Blas Wilcox (oncoming nurse) by Rabia Bonilla (offgoing nurse). Report included the following information SBAR, Kardex, Intake/Output, MAR, Recent Results and Cardiac Rhythm NSR.

## 2020-12-08 NOTE — PROGRESS NOTES
Problem: Mobility Impaired (Adult and Pediatric)  Goal: *Acute Goals and Plan of Care (Insert Text)  Outcome: Progressing Towards Goal  Note: LTG:  (1.)Ms. Zepeda will move from supine to sit and sit to supine , scoot up and down, and roll side to side with INDEPENDENT within 7 treatment day(s). (2.)Ms. Zepeda will transfer from bed to chair and chair to bed with INDEPENDENT using the least restrictive device within 7 treatment day(s). (3.)Ms. Zepeda will ambulate with SUPERVISION for 500+ feet with the least restrictive device within 7 treatment day(s) while maintaining normal vital signs. (4.)Ms. Zepeda will perform 1-2 steps with CGA within 7 treatment days for safety ascending and descending stairs for home.   _________________________________________________________________________________________      PHYSICAL THERAPY: Daily Note and AM 12/8/2020  INPATIENT: PT Visit Days : 4  [place] / [place] / Product Type: Medicare /       NAME/AGE/GENDER: Patricia Wilkins is a 67 y.o. female   PRIMARY DIAGNOSIS: Chest pain [R07.9] S/P CABG x 3 S/P CABG x 3  Procedure(s) (LRB):  CORONARY ARTERY BYPASS GRAFT (CABG X3) LIMA / CLIPPING OF LEFT ATRIAL APPENDAGE (N/A)  VEIN HARVEST ENDOSCOPIC (Left)  4 Days Post-Op  ICD-10: Treatment Diagnosis:    · Difficulty in walking, Not elsewhere classified (R26.2)   Precaution/Allergies:  Toprol xl [metoprolol succinate]      ASSESSMENT:     Ms. Marshall Marrufo presents with decreased bed mobility, transfers, ambulation, balance, activity tolerance, and overall general functional mobility s/p hospital admission with CABG x 3 on 12/4/20. Pt history significant for multiple myeloma. Pt A & O x 3 this date, 2 L O2 via n.c, chest tube, coleman. Pt reports lives with spouse in 1 story home, 1 step to enter. Pt states independent at baseline, drives, no falls. Pt in chair on contact. Agreeable to therapy.   States that she needs to use the bathroom first. CGA for sit to stand transfers, assisted to the bathroom. Patient was able to perform tony care and wash her hands. Pt with fair static and dynamic standing balance with RW. She stood and was able to amb 200' w/ RW. Sarika is slow. She returned to chair in room and after a short rest she performed therapeutic  ex as listed below. Good session. Making progress towards goals. PT to cont to follow for acute care needs to address deficits noted above. Discharge with HHPT. This section established at most recent assessment   PROBLEM LIST (Impairments causing functional limitations):  1. Decreased ADL/Functional Activities  2. Decreased Transfer Abilities  3. Decreased Ambulation Ability/Technique  4. Decreased Balance  5. Decreased Activity Tolerance  6. Increased Fatigue  7. Decreased Goltry with Home Exercise Program   INTERVENTIONS PLANNED: (Benefits and precautions of physical therapy have been discussed with the patient.)  1. Balance Exercise  2. Bed Mobility  3. Family Education  4. Gait Training  5. Home Exercise Program (HEP)  6. Therapeutic Activites  7. Therapeutic Exercise/Strengthening  8. Transfer Training     TREATMENT PLAN: Frequency/Duration: twice daily for duration of hospital stay  Rehabilitation Potential For Stated Goals: Good     REHAB RECOMMENDATIONS (at time of discharge pending progress):    Placement: It is my opinion, based on this patient's performance to date, that Ms. Zepeda may benefit from [address] after discharge due to the functional deficits listed above that are likely to improve with skilled rehabilitation because he/she has multiple medical issues that affect his/her functional mobility in the community.   Equipment:    To be determined-BSC and possible RW              HISTORY:   History of Present Injury/Illness (Reason for Referral):  67year old known female patient with CAD and NSTEMI with plans for intervention by Dr. Sin Bland, sam TAYLOR with history of small bowel AVMs. and smoldering multiple myeloma who we are following for anemia and bleeding. Hgb 7.2 on 12/1 despite PRBCs. Has 2 more units scheduled. Plan for EGD with push enteroscopy today prior to CABG. Cardiology clearance obtained by Dr. Corona Guillen. Past Medical History/Comorbidities:   Ms. Theresa Britton  has a past medical history of Anemia, Mcneil's esophagus, Coronary atherosclerosis of native coronary vessel, GERD (gastroesophageal reflux disease), HLD (hyperlipidemia), Hypercholesterolemia, Hypertension, Mild aortic stenosis, Occlusion and stenosis of carotid artery without mention of cerebral infarction, Osteoarthritis, PAD (peripheral artery disease) ([facility] [address] PONV (postoperative nausea and vomiting), S/P insertion of iliac artery stent, and Shingles. Ms. Theresa Britton  has a past surgical history that includes hx angioplasty; hx endoscopy; hx colonoscopy; hx appendectomy; hx tonsillectomy; hx orthopaedic; hx hysterectomy; vascular surgery procedure unlist (Left, 2006); hx carotid endarterectomy (Left, 03/30/2017); and hx carotid endarterectomy (Right, 05/15/2017). 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 Overall Behavior:  agreeable   Number of Personal Factors/Comorbidities that affect the Plan of Care:   Anemia, age, CAD 3+: HIGH COMPLEXITY   EXAMINATION:   Most Recent Physical Functioning:   Gross Assessment:                  Posture:     Balance:  Sitting: Intact  Sitting - Static: Good (unsupported)  Sitting - Dynamic: Good (unsupported)  Standing: Impaired  Standing - Static: Fair  Standing - Dynamic : Fair Bed Mobility:     Wheelchair Mobility:     Transfers:  Sit to Stand: Minimum assistance  Stand to Sit: Contact guard assistance  Gait:     Base of Support: Narrowed  Speed/Sarika: Slow  Distance (ft): 200 Feet (ft)  Assistive Device: Walker, rolling  Ambulation - Level of Assistance: Contact guard assistance  Interventions: Safety awareness training      Body Structures Involved:  1. Heart  2. Lungs  3. Muscles Body Functions Affected:  1. Cardio  2. Respiratory  3. Movement Related Activities and Participation Affected:  1. General Tasks and Demands  2. Mobility  3. Self Care   Number of elements that affect the Plan of Care: 4+: HIGH COMPLEXITY   CLINICAL PRESENTATION:   Presentation: Evolving clinical presentation with changing clinical characteristics: MODERATE COMPLEXITY   CLINICAL DECISION MAKIN [facility] Inpatient Short Form  How much difficulty does the patient currently have. .. Unable A Lot A Little None   1. Turning over in bed (including adjusting bedclothes, sheets and blankets)? [] 1   [x] 2   [] 3   [] 4   2. Sitting down on and standing up from a chair with arms ( e.g., wheelchair, bedside commode, etc.)   [] 1   [x] 2   [] 3   [] 4   3. Moving from lying on back to sitting on the side of the bed? [] 1   [x] 2   [] 3   [] 4   How much help from another person does the patient currently need. .. Total A Lot A Little None   4. Moving to and from a bed to a chair (including a wheelchair)? [] 1   [] 2   [x] 3   [] 4   5. Need to walk in hospital room? [] 1   [] 2   [x] 3   [] 4   6. Climbing 3-5 steps with a railing? [] 1   [x] 2   [] 3   [] 4   © , [facility] of [address] [place] [address] [address] [id number], under license to [employer].  All rights reserved      Score:  Initial: 14 Most Recent: X (Date: -- )    Interpretation of Tool:  Represents activities that are increasingly more difficult (i.e. Bed mobility, Transfers, Gait). Medical Necessity:     · Patient is expected to demonstrate progress in   · strength, range of motion, balance, and coordination  ·  to   · decrease assistance required with overall functional mobility, transfers, ambulation  · .  · Patient demonstrates   · good  ·  rehab potential due to higher previous functional level. Reason for Services/Other Comments:  · Patient   · continues to require present interventions due to patient's inability to perform bed mobility, transfers, ambulation  · . Use of outcome tool(s) and clinical judgement create a POC that gives a: Clear prediction of patient's progress: LOW COMPLEXITY            TREATMENT:   (In addition to Assessment/Re-Assessment sessions the following treatments were rendered)   Pre-treatment Symptoms/Complaints:  \"Good morning\"  Pain: Initial:     0/10 Post Session:  0/10     Therapeutic Activity: (    24 min): Therapeutic activities including Chair transfers, LE ex, Ambulation on level ground and weight shifting, walker safety, posture, pursed lip breathing to improve mobility, strength, balance and coordination. Required minimal Safety awareness training to promote static and dynamic balance in standing and promote coordination of bilateral, lower extremity(s). Therapeutic Exercise: ( 13 minutes):  Exercises per grid below to improve mobility, strength, balance and coordination. Required minimum  verbal cues  Progressed repetitions and complexity of movement as indicated.        Date:  12/08/20 Date:   Date:     ACTIVITY/EXERCISE AM PM AM PM AM PM   LAQ 15        Shoulder shrugs 15        Gluteal sets 15        marching 15        Ankle pumps 15        abduction 15                 B = bilateral; AA = active assistive; A = active; P = passive      Braces/Orthotics/Lines/Etc:   · roon air  Treatment/Session Assessment:    · Response to Treatment:  tolerated well  · Interdisciplinary Collaboration:   o Physical Therapy Assistant  o Registered Nurse  · After treatment position/precautions:   o Up in chair  o Bed alarm/tab alert on  o Bed/Chair-wheels locked  o Bed in low position  o Call light within reach  o RN notified   · Compliance with Program/Exercises: Compliant all of the time  · Recommendations/Intent for next treatment session: \"Next visit will focus on advancements to more challenging activities\".   Total Treatment Duration:  PT Patient Time In/Time Out  Time In: 0915  Time Out: 2214    Carl Monreal PTA

## 2020-12-08 NOTE — PROGRESS NOTES
ACUTE PHYSICAL THERAPY GOALS:  (Developed with and agreed upon by patient and/or caregiver.)  Mobility Impaired (Adult and Pediatric)  Goal: *Acute Goals and Plan of Care (Insert Text)  Outcome: Progressing Towards Goal  Note: LTG:  (1.)Ms. Zepeda will move from supine to sit and sit to supine , scoot up and down, and roll side to side with INDEPENDENT within 7 treatment day(s). (2.)Ms. Zepeda will transfer from bed to chair and chair to bed with INDEPENDENT using the least restrictive device within 7 treatment day(s). (3.)Ms. Zepeda will ambulate with SUPERVISION for 500+ feet with the least restrictive device within 7 treatment day(s) while maintaining normal vital signs. (4.)Ms. Zepeda will perform 1-2 steps with CGA within 7 treatment days for safety ascending and descending stairs for home.   _________________________________________________________________________________________       PHYSICAL THERAPY: Daily Note and PM Treatment Day # 4    Obdulio Luz is a 67 y.o. female   PRIMARY DIAGNOSIS: S/P CABG x 3  Procedure(s) (LRB):  CORONARY ARTERY BYPASS GRAFT (CABG X3) LIMA / CLIPPING OF LEFT ATRIAL APPENDAGE (N/A)  VEIN HARVEST ENDOSCOPIC (Left)  4 Days Post-Op    ASSESSMENT:     REHAB RECOMMENDATIONS: CURRENT LEVEL OF FUNCTION:  (Most Recently Demonstrated)   Recommendation to date pending progress:  [address] [place] [address]  Equipment:    To Be Determined Bed Mobility:   Not tested  Sit to Stand:   Minimal Assistance  Transfers:   Contact Guard Assistance  Gait/Mobility:   Contact Guard Assistance     ASSESSMENT:  Ms. Terese Foster is doing better with her mobility and safety with functional mobility. Patient requires min assist to contact guard assist with sit to stand and gait training. Patient is assisted to and from the bathroom but is independent with pericare and washing her hands. Gait training with rolling walker x 200 feet with slow trixie.   Patient is instructed in therapeutic exercises. Good session. Patient is making good progress towards goals. SUBJECTIVE:   Ms. Keiry English states, \"I'm ready\"    SOCIAL HISTORY/ LIVING ENVIRONMENT:   Home Environment: Private residence  # Steps to Enter: 1  One/Two Story Residence: One story  Living Alone: No()  Support Systems: Family member(s), Friends \ neighbors  OBJECTIVE:     PAIN: VITAL SIGNS: LINES/DRAINS:   Pre Treatment: Pain Screen  Pain Scale 1: Numeric (0 - 10)  Pain Intensity 1: 0  Post Treatment: no c/o pain   IV  O2 Device: Room air     MOBILITY: I Mod I S SBA CGA Min Mod Max Total  NT x2 Comments:   Bed Mobility    Rolling [] [] [] [x] [] [] [] [] [] [] []    Supine to Sit [] [] [] [] [] [] [] [] [] [] []    Scooting [] [] [] [x] [] [] [] [] [] [] []    Sit to Supine [] [] [] [x] [] [] [] [] [] [] []    Transfers    Sit to Stand [] [] [] [x] [] [] [] [] [] [] []    Bed to Chair [] [] [] [x] [] [] [] [] [] [] []    Stand to Sit [] [] [] [x] [] [] [] [] [] [] []    I=Independent, Mod I=Modified Independent, S=Supervision, SBA=Standby Assistance, CGA=Contact Guard Assistance,   Min=Minimal Assistance, Mod=Moderate Assistance, Max=Maximal Assistance, Total=Total Assistance, NT=Not Tested    GAIT: I Mod I S SBA CGA Min Mod Max Total  NT Comments:   Level of Assistance [] [] [] [x] [] [] [] [] [] []    Distance 200 feet     DME Rolling Walker    Gait Quality Slow trixie     I=Independent, Mod I=Modified Independent, S=Supervision, SBA=Standby Assistance, CGA=Contact Guard Assistance,   Min=Minimal Assistance, Mod=Moderate Assistance, Max=Maximal Assistance, Total=Total Assistance, NT=Not Tested    PLAN:   FREQUENCY/DURATION: PT Plan of Care: BID for duration of hospital stay or until stated goals are met, which ever comes first.  TREATMENT:     TREATMENT:   ($$ Therapeutic Activity: 8-22 mins  $$ Therapeutic Exercises: 8-22 mins    )  Therapeutic Activity (13 Minutes):  Therapeutic activity included bed mobility, transfers and gait training with rolling walker on level surfaces to improve functional Mobility, Strength and Activity tolerance. Therapeutic Exercise (10 Minutes): Therapeutic exercises noted below to improve functional activity tolerance, strength and mobility.       Date:  12/08/20 Date:   Date:     ACTIVITY/EXERCISE AM PM AM PM AM PM   LAQ 15        Shoulder shrugs 15        Gluteal sets 15        marching 15        Ankle pumps 15        abduction 15                 B = bilateral; AA = active assistive; A = active; P = passive    AFTER TREATMENT POSITION/PRECAUTIONS:  Bed, Needs within reach and RN notified    INTERDISCIPLINARY COLLABORATION:  RN/PCT and PT/PTA    TOTAL TREATMENT DURATION:  PT Patient Time In/Time Out  Time In: 1341  Time Out: 1404    Ruthy Malone-Bushra PTA

## 2020-12-09 LAB
ANION GAP SERPL CALC-SCNC: 6 MMOL/L (ref 7–16)
BUN SERPL-MCNC: 26 MG/DL (ref 8–23)
CALCIUM SERPL-MCNC: 8.5 MG/DL (ref 8.3–10.4)
CHLORIDE SERPL-SCNC: 107 MMOL/L (ref 98–107)
CO2 SERPL-SCNC: 18 MMOL/L (ref 21–32)
CREAT SERPL-MCNC: 1.34 MG/DL (ref 0.6–1)
ERYTHROCYTE [DISTWIDTH] IN BLOOD BY AUTOMATED COUNT: 15.3 % (ref 11.9–14.6)
GLUCOSE SERPL-MCNC: 117 MG/DL (ref 65–100)
HCT VFR BLD AUTO: 30 % (ref 35.8–46.3)
HGB BLD-MCNC: 9.7 G/DL (ref 11.7–15.4)
MCH RBC QN AUTO: 28 PG (ref 26.1–32.9)
MCHC RBC AUTO-ENTMCNC: 32.3 G/DL (ref 31.4–35)
MCV RBC AUTO: 86.7 FL (ref 79.6–97.8)
NRBC # BLD: 0 K/UL (ref 0–0.2)
PLATELET # BLD AUTO: 244 K/UL (ref 150–450)
PMV BLD AUTO: 10.3 FL (ref 9.4–12.3)
POTASSIUM SERPL-SCNC: 4.4 MMOL/L (ref 3.5–5.1)
RBC # BLD AUTO: 3.46 M/UL (ref 4.05–5.2)
SODIUM SERPL-SCNC: 131 MMOL/L (ref 136–145)
WBC # BLD AUTO: 6.7 K/UL (ref 4.3–11.1)

## 2020-12-09 PROCEDURE — 74011250637 HC RX REV CODE- 250/637: Performed by: THORACIC SURGERY (CARDIOTHORACIC VASCULAR SURGERY)

## 2020-12-09 PROCEDURE — 99232 SBSQ HOSP IP/OBS MODERATE 35: CPT | Performed by: INTERNAL MEDICINE

## 2020-12-09 PROCEDURE — 85027 COMPLETE CBC AUTOMATED: CPT

## 2020-12-09 PROCEDURE — 2709999900 HC NON-CHARGEABLE SUPPLY

## 2020-12-09 PROCEDURE — 36415 COLL VENOUS BLD VENIPUNCTURE: CPT

## 2020-12-09 PROCEDURE — 97530 THERAPEUTIC ACTIVITIES: CPT

## 2020-12-09 PROCEDURE — 77030012890

## 2020-12-09 PROCEDURE — 97110 THERAPEUTIC EXERCISES: CPT

## 2020-12-09 PROCEDURE — 65660000004 HC RM CVT STEPDOWN

## 2020-12-09 PROCEDURE — 80048 BASIC METABOLIC PNL TOTAL CA: CPT

## 2020-12-09 RX ADMIN — AMIODARONE HYDROCHLORIDE 200 MG: 200 TABLET ORAL at 08:28

## 2020-12-09 RX ADMIN — ROSUVASTATIN CALCIUM 20 MG: 20 TABLET, COATED ORAL at 21:42

## 2020-12-09 RX ADMIN — AMIODARONE HYDROCHLORIDE 200 MG: 200 TABLET ORAL at 17:09

## 2020-12-09 RX ADMIN — PANTOPRAZOLE SODIUM 40 MG: 40 TABLET, DELAYED RELEASE ORAL at 06:25

## 2020-12-09 RX ADMIN — Medication 10 ML: at 15:23

## 2020-12-09 RX ADMIN — Medication 10 ML: at 15:24

## 2020-12-09 RX ADMIN — CARVEDILOL 3.12 MG: 3.12 TABLET, FILM COATED ORAL at 08:27

## 2020-12-09 RX ADMIN — ACETAMINOPHEN 650 MG: 325 TABLET, FILM COATED ORAL at 07:33

## 2020-12-09 RX ADMIN — ACETAMINOPHEN 650 MG: 325 TABLET, FILM COATED ORAL at 03:11

## 2020-12-09 RX ADMIN — Medication 1 AMPULE: at 21:42

## 2020-12-09 RX ADMIN — CARVEDILOL 3.12 MG: 3.12 TABLET, FILM COATED ORAL at 17:10

## 2020-12-09 RX ADMIN — Medication 10 ML: at 06:28

## 2020-12-09 RX ADMIN — ACETAMINOPHEN 650 MG: 325 TABLET, FILM COATED ORAL at 21:43

## 2020-12-09 RX ADMIN — Medication 1 AMPULE: at 08:28

## 2020-12-09 RX ADMIN — Medication 10 ML: at 21:46

## 2020-12-09 RX ADMIN — GUAIFENESIN 600 MG: 600 TABLET ORAL at 21:42

## 2020-12-09 RX ADMIN — GUAIFENESIN 600 MG: 600 TABLET ORAL at 08:28

## 2020-12-09 RX ADMIN — ASPIRIN 81 MG: 81 TABLET ORAL at 08:28

## 2020-12-09 RX ADMIN — ACETAMINOPHEN 650 MG: 325 TABLET, FILM COATED ORAL at 15:23

## 2020-12-09 NOTE — DISCHARGE SUMMARY
Discharge Summary     Patient ID:  Jaieme Valle  [id number]  56 y.o.  1945    Admit date: 11/29/2020    Discharge date:  12/10/2020    Admitting Physician: Ron Gunn MD     Discharge Physician: GÉNESIS Putnam/Dr. Cass Don    Admission Diagnoses: Chest pain [R07.9]    Discharge Diagnoses:   Patient Active Problem List    Diagnosis Date Noted    Encounter for weaning from ventilator [facility]) 12/04/2020    Hypoxia 12/04/2020    S/P CABG x 3 12/04/2020    NSTEMI (non-ST elevated myocardial infarction) ([facility] [address] 11/29/2020    Hypomagnesemia 11/29/2020    Syncope and collapse 11/29/2020    Nonrheumatic aortic valve stenosis 10/15/2020    Carotid stenosis, right 05/15/2017    Post-operative nausea and vomiting 04/01/2017    Carotid stenosis, left 03/30/2017    Iron deficiency anemia 03/21/2017    Coronary atherosclerosis of native coronary vessel 03/21/2017    Carotid artery stenosis without cerebral infarction 03/21/2017    Occlusion and stenosis of carotid artery without mention of cerebral infarction 12/10/2013    CKD (chronic kidney disease), stage III 12/10/2013    Mcneil's esophagus 12/10/2013    Dyslipidemia 12/10/2013    Anemia 12/10/2013    PAD (peripheral artery disease) ([facility] [address] 12/10/2013    Hypertension     [facility]        Procedures this admission:  Diagnostic left heart catheterization  EchoCardiogram  Push enteroscopy, Coronary Artery Bypass Graft Surgery, Clipping of the left atrial appendage   Consults: Cardiac Surgery, GI Pulmonary/Intensive Care     Hospital Course: The patient is a 67 y.o. female who presented to the ER at [address] after syncopal episode. She was lying in the floor when she regained consciousness. She has noted some intermittent chest pain recently. Her troponin was elevated on arrival. She has mild to moderate AS. She was transferred to [facility]. Troponin continued to rise consistent with NSTEMI.  She underwent cardiac catheterization that showed high grade stenosis in the LCx and RCA. LV gram showed mild to moderately reduced LV function with inferolateral hypokinesis. There was no significant gradient across the AV. She has \"smoldering\" multiple myeloma as well as TAYLOR. She was transfused about 2 weeks ago and was due for another transfusion the week of admission. She does not use any mobility device. She stated she feels weak and easily fatigued all the time. She has PAD and carotid stenosis s/p CEA. After LHC, she developed worsening anemia and required transfusion. She was seen by GI with plans for push enteroscopy. She underwent push enteroscopy on 12/1. She was noted to have bleeding from small AVM. Two Endo Clips were deployed and the bleeding stopped. Her Hgb/Hct remained stable after transfusion. She wished to proceed with CABG surgery. She underwent CABG X 3 with LIMA to the diagonal, reverse SVG to the OM and reverse SVG to the PDA as well as clipping of the left atrial appendage on 12/4/20. She did well post operatively and was transferred to CV stepdown on POD 1. She developed DIETER and lasix was held. She was weaned off of O2. BUN/Creatinine remained elevated and she was hyponatremic. She was started on IV fluids. Renal function and hyponatremia improved. Her UOP improved. She had a poor appetite. She progressed well with PT. Hgb/Hct remained stable. Renal function returned to baseline. ARB was added for BP control. Her appetite improved. The morning of 12/10/20, patient was feeling well and ambulating without any symptoms. Patient was determined stable and ready for discharge. Patient was instructed on the importance of medication compliance and outpatient follow up. For maximized medical therapy for CAD, patient will continue ASA, BB, ARB, and statin as well. Statin dose increased during admission.    The patient will have close transitional care follow up with [facility] Dr. Chance Ashby on December 18th at 2:15pm ([address] [place] [address]). The patient will follow up with Dr. Trini Broderick on January 6th at 2:40pm and has been referred to cardiac rehab. DISPOSITION: The patient is being discharged home with home health services in stable condition on a low saturated fat, low cholesterol and low salt diet. The patient is instructed to advance activities as tolerated to the limit of fatigue or shortness of breath. The patient is instructed to avoid all heavy lifting or activities that strain the chest or upper arm muscles. Strenuous activity should be avoided. The patient is instructed to watch for signs of infection which include: increasing area of redness, fever or purulent drainage at the incision site. The patient is instructed to call the office or return to the ER for immediate evaluation for any shortness of breath or chest pain not relieved by NTG.     Discharge Exam:   Visit Vitals  /65   Pulse 69   Temp 97.6 °F (36.4 °C)   Resp 18   Ht 5' 2.99\" (1.6 m)   Wt 175 lb 3.2 oz (79.5 kg)   SpO2 97%   BMI 31.04 kg/m²         Physical Exam:  General: Well Developed, Well Nourished, No Acute Distress, Alert & Oriented  Neck: supple, no JVD  Heart: S1S2 with RRR without murmurs or gallops  Lungs: Clear throughout auscultation bilaterally without adventitious sounds  Abd: soft, nontender, nondistended, with good bowel sounds  Ext: warm, no edema  Sternal incision: clean, dry, and intact  Skin: warm and dry      Recent Results (from the past 24 hour(s))   METABOLIC PANEL, BASIC    Collection Time: 12/09/20  3:46 AM   Result Value Ref Range    Sodium 131 (L) 136 - 145 mmol/L    Potassium 4.4 3.5 - 5.1 mmol/L    Chloride 107 98 - 107 mmol/L    CO2 18 (L) 21 - 32 mmol/L    Anion gap 6 (L) 7 - 16 mmol/L    Glucose 117 (H) 65 - 100 mg/dL    BUN 26 (H) 8 - 23 MG/DL    Creatinine 1.34 (H) 0.6 - 1.0 MG/DL    GFR est AA 49 (L) >60 ml/min/1.73m2    GFR est non-AA 41 (L) >60 ml/min/1.73m2    Calcium 8.5 8.3 - 10.4 MG/DL   CBC W/O DIFF Collection Time: 12/09/20  3:46 AM   Result Value Ref Range    WBC 6.7 4.3 - 11.1 K/uL    RBC 3.46 (L) 4.05 - 5.2 M/uL    HGB 9.7 (L) 11.7 - 15.4 g/dL    HCT 30.0 (L) 35.8 - 46.3 %    MCV 86.7 79.6 - 97.8 FL    MCH 28.0 26.1 - 32.9 PG    MCHC 32.3 31.4 - 35.0 g/dL    RDW 15.3 (H) 11.9 - 14.6 %    PLATELET 876 482 - 185 K/uL    MPV 10.3 9.4 - 12.3 FL    ABSOLUTE NRBC 0.00 0.0 - 0.2 K/uL         Patient Instructions:   Current Discharge Medication List      START taking these medications    Details   carvediloL (COREG) 3.125 mg tablet Take 1 Tab by mouth two (2) times daily (with meals). Qty: 60 Tab, Refills: 2      losartan (COZAAR) 25 mg tablet Take 1 Tab by mouth daily. Qty: 30 Tab, Refills: 2      traMADoL (ULTRAM) 50 mg tablet Take 1 Tab by mouth every six (6) hours as needed for Pain for up to 5 days. Max Daily Amount: 200 mg. Qty: 20 Tab, Refills: 0    Associated Diagnoses: S/P CABG x 3         CONTINUE these medications which have CHANGED    Details   rosuvastatin (CRESTOR) 20 mg tablet Take 1 Tab by mouth nightly. Qty: 90 Tab, Refills: 3         CONTINUE these medications which have NOT CHANGED    Details   nitroglycerin (NITROSTAT) 0.4 mg SL tablet 1 Tab by SubLINGual route every five (5) minutes as needed for Chest Pain. Up to 3 doses. Qty: 1 Bottle, Refills: 3    Associated Diagnoses: Chest pain, unspecified type; Atherosclerosis of native coronary artery of native heart with angina pectoris (HCC)      acetaminophen (TYLENOL) 325 mg tablet Take 325 mg by mouth as needed for Pain. cholecalciferol, vitamin D3, (VITAMIN D3) 2,000 unit tab Take 2,000 Units by mouth. Couple times a week      vitamin E (AQUA GEMS) 400 unit capsule Take 400 Units by mouth. Couple times a week      aspirin delayed-release (ASPIRIN EC) 81 mg tablet Take 81 mg by mouth every morning. esomeprazole (NEXIUM) 40 mg capsule Take 20 mg by mouth every morning. ASCORBIC ACID (VITAMIN C PO) Take 1 Tab by mouth.  Couple of times a week         STOP taking these medications       hydroCHLOROthiazide 25 mg tab 25 mg, lisinopril 20 mg tab 20 mg Comments:   Reason for Stopping:                 Signed:  Ed Youssef PA-C  12/10/2020

## 2020-12-09 NOTE — PROGRESS NOTES
ACUTE PHYSICAL THERAPY GOALS:  (Developed with and agreed upon by patient and/or caregiver.)  Mobility Impaired (Adult and Pediatric)  Goal: *Acute Goals and Plan of Care (Insert Text)  Outcome: Progressing Towards Goal  Note: LTG:  (1.)Ms. Zepeda will move from supine to sit and sit to supine , scoot up and down, and roll side to side with INDEPENDENT within 7 treatment day(s). (2.)Ms. Zepeda will transfer from bed to chair and chair to bed with INDEPENDENT using the least restrictive device within 7 treatment day(s). (3.)Ms. Zepeda will ambulate with SUPERVISION for 500+ feet with the least restrictive device within 7 treatment day(s) while maintaining normal vital signs. (4.)Ms. Zepeda will perform 1-2 steps with CGA within 7 treatment days for safety ascending and descending stairs for home.   _________________________________________________________________________________________       PHYSICAL THERAPY: Daily Note and PM Treatment Day # 5    Audelia Zepeda is a 67 y.o. female   PRIMARY DIAGNOSIS: S/P CABG x 3  Procedure(s) (LRB):  CORONARY ARTERY BYPASS GRAFT (CABG X3) LIMA / CLIPPING OF LEFT ATRIAL APPENDAGE (N/A)  VEIN HARVEST ENDOSCOPIC (Left)  5 Days Post-Op    ASSESSMENT:     REHAB RECOMMENDATIONS: CURRENT LEVEL OF FUNCTION:  (Most Recently Demonstrated)   Recommendation to date pending progress:  [address] [place] [address]  Equipment:    To Be Determined Bed Mobility:   Not tested  Sit to Stand:   Minimal Assistance  Transfers:   Contact Guard Assistance  Gait/Mobility:   Contact Guard Assistance     ASSESSMENT:  Ms. Barbara Kinsey is sitting in the recliner and agreeable to therapy. States that she doesn't feel as well today. She  is doing better with her mobility and safety with functional mobility. Patient requires min assist to contact guard assist with sit to stand and gait training. Patient is assisted to and from the bathroom but is independent with pericare and washing her hands. Gait training with rolling walker x 200 feet with slow trixie. Returned to the  Recliner and performed therapeutic exercises. Good session however the patient appears tired. Patient is making good progress towards goals. Left sitting in the recliner at he end of the treatment session. Will continue PT efforts. HHPT at discharge. PM note:  Patient is agreeable to therapy. Patient is able to maintain her am gait distance and participate in therapeutic exercises. Patient is left sitting in the recliner with needs within reach . Good session. Making progress with gait and mobility. Will continue PT efforts.        SUBJECTIVE:   Ms. Kailey Thorne states, \"OK\"    SOCIAL HISTORY/ LIVING ENVIRONMENT:   Home Environment: Private residence  # Steps to Enter: 1  One/Two Story Residence: One story  Living Alone: No()  Support Systems: Family member(s), Friends \ neighbors  OBJECTIVE:     PAIN: VITAL SIGNS: LINES/DRAINS:   Pre Treatment: Pain Screen  Pain Scale 1: Numeric (0 - 10)  Pain Intensity 1: 0  Post Treatment: no c/o pain     O2 Device: Room air     MOBILITY: I Mod I S SBA CGA Min Mod Max Total  NT x2 Comments:   Bed Mobility    Rolling [] [] [] [] [] [] [] [] [] [x] []    Supine to Sit [] [] [] [] [] [] [] [] [] [x] []    Scooting [] [] [] [] [] [] [] [] [] [x] []    Sit to Supine [] [] [] [] [] [] [] [] [] [x] []    Transfers    Sit to Stand [] [] [] [x] [] [] [] [] [] [] []    Bed to Chair [] [] [] [x] [] [] [] [] [] [] []    Stand to Sit [] [] [x] [] [] [] [] [] [] [] []    I=Independent, Mod I=Modified Independent, S=Supervision, SBA=Standby Assistance, CGA=Contact Guard Assistance,   Min=Minimal Assistance, Mod=Moderate Assistance, Max=Maximal Assistance, Total=Total Assistance, NT=Not Tested    GAIT: I Mod I S SBA CGA Min Mod Max Total  NT Comments:   Level of Assistance [] [] [] [x] [] [] [] [] [] []    Distance 200 feet     DME Rolling Walker    Gait Quality Slow trixie     I=Independent, Mod I=Modified Independent, S=Supervision, SBA=Standby Assistance, CGA=Contact Guard Assistance,   Min=Minimal Assistance, Mod=Moderate Assistance, Max=Maximal Assistance, Total=Total Assistance, NT=Not Tested    PLAN:   FREQUENCY/DURATION: PT Plan of Care: BID for duration of hospital stay or until stated goals are met, which ever comes first.  TREATMENT:     TREATMENT:   ($$ Therapeutic Activity: 8-22 mins  $$ Therapeutic Exercises: 8-22 mins    )  Therapeutic Activity (14 Minutes): Therapeutic activity included bed mobility, transfers and gait training with rolling walker on level surfaces to improve functional Mobility, Strength and Activity tolerance. Therapeutic Exercise (12 Minutes): Therapeutic exercises noted below to improve functional activity tolerance, strength and mobility.       Date:  12/08/20 Date:  12/09/20 Date:     ACTIVITY/EXERCISE AM PM AM PM AM PM   LAQ 15  15 15     Shoulder shrugs 15  15 15     Gluteal sets 15  15 15     marching 15  15 15     Ankle pumps 15  15 15     abduction 15  15 15              B = bilateral; AA = active assistive; A = active; P = passive    AFTER TREATMENT POSITION/PRECAUTIONS:  Chair, Needs within reach and RN notified    INTERDISCIPLINARY COLLABORATION:  RN/PCT and PT/PTA    TOTAL TREATMENT DURATION:  PT Patient Time In/Time Out  Time In: 1240  Time Out: 1306    Maryse Aguilera PTA

## 2020-12-09 NOTE — PROGRESS NOTES
Problem: Falls - Risk of  Goal: *Absence of Falls  Description: Document Kalen Weston Fall Risk and appropriate interventions in the flowsheet.   Outcome: Progressing Towards Goal  Note: Fall Risk Interventions:  Mobility Interventions: Bed/chair exit alarm, Communicate number of staff needed for ambulation/transfer, Patient to call before getting OOB, PT Consult for mobility concerns, Strengthening exercises (ROM-active/passive), Utilize walker, cane, or other assistive device         Medication Interventions: Bed/chair exit alarm, Patient to call before getting OOB, Teach patient to arise slowly    Elimination Interventions: Bed/chair exit alarm, Call light in reach, Patient to call for help with toileting needs, Stay With Me (per policy)    History of Falls Interventions: Bed/chair exit alarm, Consult care management for discharge planning, Door open when patient unattended

## 2020-12-09 NOTE — PROGRESS NOTES
Today's Date: 12/9/2020  Date of Admission: 11/29/2020    Chart Reviewed. Subjective:     Patient feels some better. She complains of swelling in her hands and upper legs. Appetite remains poor. Medications Reviewed. Objective:     Vitals:    12/08/20 2309 12/09/20 0308 12/09/20 0732 12/09/20 0827   BP: (!) 105/54 (!) 119/58 (!) 157/72 (!) 157/72   Pulse: 61 65 73    Resp: 18 18 20    Temp: 98.1 °F (36.7 °C) 98 °F (36.7 °C) 98 °F (36.7 °C)    SpO2: 99% 96% 97%    Weight:  178 lb 6.4 oz (80.9 kg)     Height:           Intake and Output  Current Shift: 12/09 0701 - 12/09 1900  In: 120 [P.O.:120]  Out: -    Last 3 Shifts: 12/07 1901 - 12/09 0700  In: 1877 [P.O.:1282; I.V.:1225]  Out: 1800 [Urine:1800]    Physical Exam:  General: Well Developed, Well Nourished, No Acute Distress, Alert & Oriented x 3, Appropriate mood  Neck: supple, no JVD  Heart: S1S2 with RRR without murmurs or gallops  Lungs: Clear throughout auscultation bilaterally without adventitious sounds  Abd: soft, nontender, nondistended, with good bowel sounds  Ext: no LE edema bilaterally  Sternal incision: clean, dry, and intact  Skin: warm and dry    LABS  Data Review:   Recent Labs     12/09/20  0346 12/08/20  0406 12/07/20  0333   * 130* 125*   K 4.4 3.9 4.1   MG  --  2.1 2.0   BUN 26* 31* 35*   CREA 1.34* 1.64* 2.20*   * 109* 142*   WBC 6.7 8.2  --    HGB 9.7* 10.3*  --    HCT 30.0* 30.5*  --     185  --        Estimated Creatinine Clearance: 36 mL/min (A) (based on SCr of 1.34 mg/dL (H)).       Assessment/Plan:     Principal Problem:    S/P CABG x 3 (12/4/2020)  On ASA, BB, statin, lasix held due to DIETER, no ACE-I/ARB, renal function and hyponatremia improved after IV fluid, appetite remains poor, continue PT     Active Problems:    Hypertension ()  BP elevated at times, will allow higher BP for better renal perfusion       CKD (chronic kidney disease), stage III (12/10/2013)  Renal function improved, UOP improved       Dyslipidemia (12/10/2013)  statin       Iron deficiency anemia (3/21/2017)  Hgb remains stable        Coronary atherosclerosis of native coronary vessel (3/21/2017)    S/p CABG        Nonrheumatic aortic valve stenosis (10/15/2020)  Mild        NSTEMI (non-ST elevated myocardial infarction) ([facility] [address] (11/29/2020)  S/p CABG       Hypomagnesemia (11/29/2020)  Resolved        Syncope and collapse (11/29/2020)  Likely due to worsening anemia, no arrhythmias noted on telemetry        Encounter for weaning from ventilator [facility]) (12/4/2020)          Hypoxia (12/4/2020)  Resolved, stable on room air           Lesly Rojas PA-C

## 2020-12-09 NOTE — PROGRESS NOTES
[facility] CARDIOLOGY PROGRESS NOTE           12/9/2020 8:40 AM    Admit Date: 11/29/2020         Subjective: Renal function has improved, she is complaining of being 20 lbs heavier than admission and that her legs are swollen, painful and tense. ROS:  Cardiovascular:  As noted above    Objective:      Vitals:    12/08/20 2309 12/09/20 0308 12/09/20 0732 12/09/20 0827   BP: (!) 105/54 (!) 119/58 (!) 157/72 (!) 157/72   Pulse: 61 65 73    Resp: 18 18 20    Temp: 98.1 °F (36.7 °C) 98 °F (36.7 °C) 98 °F (36.7 °C)    SpO2: 99% 96% 97%    Weight:  178 lb 6.4 oz (80.9 kg)     Height:             Physical Exam:  General: Well Developed, Well Nourished, No Acute Distress, Alert & Oriented x 3, Appropriate mood  Neck: supple, no JVD  Heart: S1S2 with RRR without murmurs or gallops  Lungs: Clear throughout auscultation bilaterally without adventitious sounds  Abd: soft, nontender, nondistended, with good bowel sounds  Ext: no edema bilaterally  Skin: warm and dry    Wt Readings from Last 10 Encounters:   12/09/20 178 lb 6.4 oz (80.9 kg)   11/29/20 159 lb (72.1 kg)   10/15/20 163 lb (73.9 kg)   03/28/18 165 lb (74.8 kg)   05/24/17 165 lb (74.8 kg)   05/15/17 165 lb 1 oz (74.9 kg)   05/11/17 166 lb 1 oz (75.3 kg)   04/18/17 163 lb (73.9 kg)   03/30/17 163 lb 12 oz (74.3 kg)   03/23/17 167 lb 11.2 oz (76.1 kg)         Data Review:   Recent Labs     12/09/20  0346 12/08/20  0406 12/07/20  0333   * 130* 125*   K 4.4 3.9 4.1   MG  --  2.1 2.0   BUN 26* 31* 35*   CREA 1.34* 1.64* 2.20*   * 109* 142*   WBC 6.7 8.2  --    HGB 9.7* 10.3*  --    HCT 30.0* 30.5*  --     185  --        No results for input(s): TNIPOC, TROIQ in the last 72 hours.         Assessment/Plan:     Principal Problem:    S/P CABG x 3 (12/4/2020)    Active Problems:    Hypertension ()      CKD (chronic kidney disease), stage III (12/10/2013)      Dyslipidemia (12/10/2013)      Iron deficiency anemia (3/21/2017) Overview:  :  Has requires frequent transfusions. No obvious source identified. Coronary atherosclerosis of native coronary vessel (3/21/2017)      Overview:       1. Cardiac  Calcium score (12/28/08) : Total: 218. LAD: 129, Lcx: 29,       RCA: 60      2. Lexiscan Cardiolite (3/9/10):  Apical thinning. No ischemia. Normal       LV function EF 68%. Nonrheumatic aortic valve stenosis (10/15/2020)      Overview: Echo (11/6/20):  EF 60-65%. +DD. Mild LAE. Aortic Stenosis:  MG 13.  PG       22.  DI 0.45. Mild MR/TR. NSTEMI (non-ST elevated myocardial infarction) ([facility] [address] (11/29/2020)      Hypomagnesemia (11/29/2020)      Syncope and collapse (11/29/2020)      Encounter for weaning from ventilator [facility]) (12/4/2020)      Hypoxia (12/4/2020)    A/P  1) CAD - post op from CABG continue asa/statin  2) HTN - controlled  3) DIETER - improved with IVF and holding diuretic no signs of fluid overload, no signs of LE swelling. Check daily BMP will add on NTproBNP with tomorrows AM labs.       Amy Heredia MD  12/9/2020 8:40 AM

## 2020-12-09 NOTE — PROGRESS NOTES
ACUTE PHYSICAL THERAPY GOALS:  (Developed with and agreed upon by patient and/or caregiver.)  Mobility Impaired (Adult and Pediatric)  Goal: *Acute Goals and Plan of Care (Insert Text)  Outcome: Progressing Towards Goal  Note: LTG:  (1.)Ms. Zepeda will move from supine to sit and sit to supine , scoot up and down, and roll side to side with INDEPENDENT within 7 treatment day(s). (2.)Ms. Zepeda will transfer from bed to chair and chair to bed with INDEPENDENT using the least restrictive device within 7 treatment day(s). (3.)Ms. Zepeda will ambulate with SUPERVISION for 500+ feet with the least restrictive device within 7 treatment day(s) while maintaining normal vital signs. (4.)Ms. Zepeda will perform 1-2 steps with CGA within 7 treatment days for safety ascending and descending stairs for home.   _________________________________________________________________________________________       PHYSICAL THERAPY: Daily Note and AM Treatment Day # 5    Audelia Zepeda is a 67 y.o. female   PRIMARY DIAGNOSIS: S/P CABG x 3  Procedure(s) (LRB):  CORONARY ARTERY BYPASS GRAFT (CABG X3) LIMA / CLIPPING OF LEFT ATRIAL APPENDAGE (N/A)  VEIN HARVEST ENDOSCOPIC (Left)  5 Days Post-Op    ASSESSMENT:     REHAB RECOMMENDATIONS: CURRENT LEVEL OF FUNCTION:  (Most Recently Demonstrated)   Recommendation to date pending progress:  [address] [place] [address]  Equipment:    To Be Determined Bed Mobility:   Not tested  Sit to Stand:   Minimal Assistance  Transfers:   Contact Guard Assistance  Gait/Mobility:   Contact Guard Assistance     ASSESSMENT:  Ms. Mireya Horvath is sitting in the recliner and agreeable to therapy. States that she doesn't feel as well today. She  is doing better with her mobility and safety with functional mobility. Patient requires min assist to contact guard assist with sit to stand and gait training. Patient is assisted to and from the bathroom but is independent with pericare and washing her hands. Gait training with rolling walker x 200 feet with slow trixie. Returned to the  Recliner and performed therapeutic exercises. Good session however the patient appears tired. Patient is making good progress towards goals. Left sitting in the recliner at he end of the treatment session. Will continue PT efforts. HHPT at discharge.      SUBJECTIVE:   Ms. Iveth Wilkins states, \"OK\"    SOCIAL HISTORY/ LIVING ENVIRONMENT:   Home Environment: Private residence  # Steps to Enter: 1  One/Two Story Residence: One story  Living Alone: No()  Support Systems: Family member(s), Friends \ neighbors  OBJECTIVE:     PAIN: VITAL SIGNS: LINES/DRAINS:   Pre Treatment: Pain Screen  Pain Scale 1: Numeric (0 - 10)  Pain Intensity 1: 0  Post Treatment: no c/o pain     O2 Device: Room air     MOBILITY: I Mod I S SBA CGA Min Mod Max Total  NT x2 Comments:   Bed Mobility    Rolling [] [] [] [] [] [] [] [] [] [x] []    Supine to Sit [] [] [] [] [] [] [] [] [] [x] []    Scooting [] [] [] [] [] [] [] [] [] [x] []    Sit to Supine [] [] [] [] [] [] [] [] [] [x] []    Transfers    Sit to Stand [] [] [] [x] [] [] [] [] [] [] []    Bed to Chair [] [] [] [x] [] [] [] [] [] [] []    Stand to Sit [] [] [] [x] [] [] [] [] [] [] []    I=Independent, Mod I=Modified Independent, S=Supervision, SBA=Standby Assistance, CGA=Contact Guard Assistance,   Min=Minimal Assistance, Mod=Moderate Assistance, Max=Maximal Assistance, Total=Total Assistance, NT=Not Tested    GAIT: I Mod I S SBA CGA Min Mod Max Total  NT Comments:   Level of Assistance [] [] [] [x] [] [] [] [] [] []    Distance 200 feet     DME Rolling Walker    Gait Quality Slow trixie     I=Independent, Mod I=Modified Independent, S=Supervision, SBA=Standby Assistance, CGA=Contact Guard Assistance,   Min=Minimal Assistance, Mod=Moderate Assistance, Max=Maximal Assistance, Total=Total Assistance, NT=Not Tested    PLAN:   FREQUENCY/DURATION: PT Plan of Care: BID for duration of hospital stay or until stated goals are met, which ever comes first.  TREATMENT:     TREATMENT:   ($$ Therapeutic Activity: 8-22 mins  $$ Therapeutic Exercises: 8-22 mins    )  Therapeutic Activity (13 Minutes): Therapeutic activity included bed mobility, transfers and gait training with rolling walker on level surfaces to improve functional Mobility, Strength and Activity tolerance. Therapeutic Exercise (10 Minutes): Therapeutic exercises noted below to improve functional activity tolerance, strength and mobility.       Date:  12/08/20 Date:  12/09/20 Date:     ACTIVITY/EXERCISE AM PM AM PM AM PM   LAQ 15  15      Shoulder shrugs 15  15      Gluteal sets 15  15      marching 15  15      Ankle pumps 15  15      abduction 15  15               B = bilateral; AA = active assistive; A = active; P = passive    AFTER TREATMENT POSITION/PRECAUTIONS:  Chair, Needs within reach and RN notified    INTERDISCIPLINARY COLLABORATION:  RN/PCT and PT/PTA    TOTAL TREATMENT DURATION:  PT Patient Time In/Time Out  Time In: 2944  Time Out: 205 Noelle Monreal PTA

## 2020-12-09 NOTE — PROGRESS NOTES
Bedside shift change report given to Marshall Carlton (oncoming nurse) by Yony Macedo (offgoing nurse). Report included the following information SBAR, Kardex, Intake/Output, MAR, Recent Results and Cardiac Rhythm NSR.

## 2020-12-10 VITALS
HEIGHT: 63 IN | TEMPERATURE: 97.8 F | HEART RATE: 65 BPM | RESPIRATION RATE: 18 BRPM | BODY MASS INDEX: 31.04 KG/M2 | OXYGEN SATURATION: 99 % | WEIGHT: 175.2 LBS | SYSTOLIC BLOOD PRESSURE: 142 MMHG | DIASTOLIC BLOOD PRESSURE: 65 MMHG

## 2020-12-10 LAB
ANION GAP SERPL CALC-SCNC: 7 MMOL/L (ref 7–16)
BNP SERPL-MCNC: ABNORMAL PG/ML
BUN SERPL-MCNC: 21 MG/DL (ref 8–23)
CALCIUM SERPL-MCNC: 9 MG/DL (ref 8.3–10.4)
CHLORIDE SERPL-SCNC: 109 MMOL/L (ref 98–107)
CO2 SERPL-SCNC: 20 MMOL/L (ref 21–32)
CREAT SERPL-MCNC: 1.15 MG/DL (ref 0.6–1)
ERYTHROCYTE [DISTWIDTH] IN BLOOD BY AUTOMATED COUNT: 15.4 % (ref 11.9–14.6)
GLUCOSE SERPL-MCNC: 115 MG/DL (ref 65–100)
HCT VFR BLD AUTO: 29.7 % (ref 35.8–46.3)
HGB BLD-MCNC: 9.9 G/DL (ref 11.7–15.4)
MCH RBC QN AUTO: 28.8 PG (ref 26.1–32.9)
MCHC RBC AUTO-ENTMCNC: 33.3 G/DL (ref 31.4–35)
MCV RBC AUTO: 86.3 FL (ref 79.6–97.8)
NRBC # BLD: 0 K/UL (ref 0–0.2)
PLATELET # BLD AUTO: 281 K/UL (ref 150–450)
PMV BLD AUTO: 9.8 FL (ref 9.4–12.3)
POTASSIUM SERPL-SCNC: 4.4 MMOL/L (ref 3.5–5.1)
RBC # BLD AUTO: 3.44 M/UL (ref 4.05–5.2)
SODIUM SERPL-SCNC: 136 MMOL/L (ref 136–145)
WBC # BLD AUTO: 6.9 K/UL (ref 4.3–11.1)

## 2020-12-10 PROCEDURE — 36415 COLL VENOUS BLD VENIPUNCTURE: CPT

## 2020-12-10 PROCEDURE — 74011250637 HC RX REV CODE- 250/637: Performed by: THORACIC SURGERY (CARDIOTHORACIC VASCULAR SURGERY)

## 2020-12-10 PROCEDURE — 85027 COMPLETE CBC AUTOMATED: CPT

## 2020-12-10 PROCEDURE — 77030012890

## 2020-12-10 PROCEDURE — 97110 THERAPEUTIC EXERCISES: CPT

## 2020-12-10 PROCEDURE — 83880 ASSAY OF NATRIURETIC PEPTIDE: CPT

## 2020-12-10 PROCEDURE — 74011250637 HC RX REV CODE- 250/637: Performed by: PHYSICIAN ASSISTANT

## 2020-12-10 PROCEDURE — 99231 SBSQ HOSP IP/OBS SF/LOW 25: CPT | Performed by: INTERNAL MEDICINE

## 2020-12-10 PROCEDURE — 97530 THERAPEUTIC ACTIVITIES: CPT

## 2020-12-10 PROCEDURE — 2709999900 HC NON-CHARGEABLE SUPPLY

## 2020-12-10 PROCEDURE — 80048 BASIC METABOLIC PNL TOTAL CA: CPT

## 2020-12-10 RX ORDER — LOSARTAN POTASSIUM 25 MG/1
25 TABLET ORAL DAILY
Status: DISCONTINUED | OUTPATIENT
Start: 2020-12-10 | End: 2020-12-10 | Stop reason: HOSPADM

## 2020-12-10 RX ORDER — LOSARTAN POTASSIUM 25 MG/1
25 TABLET ORAL DAILY
Qty: 30 TAB | Refills: 2 | Status: SHIPPED | OUTPATIENT
Start: 2020-12-10 | End: 2021-01-15 | Stop reason: SDUPTHER

## 2020-12-10 RX ORDER — CARVEDILOL 3.12 MG/1
3.12 TABLET ORAL 2 TIMES DAILY WITH MEALS
Qty: 60 TAB | Refills: 2 | Status: SHIPPED | OUTPATIENT
Start: 2020-12-10 | End: 2021-01-15 | Stop reason: SDUPTHER

## 2020-12-10 RX ORDER — ROSUVASTATIN CALCIUM 20 MG/1
20 TABLET, COATED ORAL
Qty: 90 TAB | Refills: 3 | Status: SHIPPED | OUTPATIENT
Start: 2020-12-10 | End: 2021-01-04

## 2020-12-10 RX ORDER — TRAMADOL HYDROCHLORIDE 50 MG/1
50 TABLET ORAL
Qty: 20 TAB | Refills: 0 | Status: SHIPPED | OUTPATIENT
Start: 2020-12-10 | End: 2020-12-15

## 2020-12-10 RX ADMIN — PANTOPRAZOLE SODIUM 40 MG: 40 TABLET, DELAYED RELEASE ORAL at 05:43

## 2020-12-10 RX ADMIN — ASPIRIN 81 MG: 81 TABLET ORAL at 09:01

## 2020-12-10 RX ADMIN — LOSARTAN POTASSIUM 25 MG: 25 TABLET, FILM COATED ORAL at 09:05

## 2020-12-10 RX ADMIN — GUAIFENESIN 600 MG: 600 TABLET ORAL at 09:01

## 2020-12-10 RX ADMIN — AMIODARONE HYDROCHLORIDE 200 MG: 200 TABLET ORAL at 09:01

## 2020-12-10 RX ADMIN — Medication 10 ML: at 05:46

## 2020-12-10 RX ADMIN — CARVEDILOL 3.12 MG: 3.12 TABLET, FILM COATED ORAL at 09:01

## 2020-12-10 RX ADMIN — ACETAMINOPHEN 650 MG: 325 TABLET, FILM COATED ORAL at 05:43

## 2020-12-10 RX ADMIN — Medication 1 AMPULE: at 09:01

## 2020-12-10 NOTE — PROGRESS NOTES
Discharge instructions, follow up appointments and prescriptions reviewed with patient and family. Both verbalize understanding. All personal belongings taken with patient. Family member will drive patient home. Patient escorted to discharge area via wheelchair. Patient is stable at discharge. IV and sutures removed.

## 2020-12-10 NOTE — PROGRESS NOTES
ACUTE PHYSICAL THERAPY GOALS:  (Developed with and agreed upon by patient and/or caregiver.)  Mobility Impaired (Adult and Pediatric)  Goal: *Acute Goals and Plan of Care (Insert Text)  Outcome: Progressing Towards Goal  Note: LTG:  (1.)Ms. Zepeda will move from supine to sit and sit to supine , scoot up and down, and roll side to side with INDEPENDENT within 7 treatment day(s). (2.)Ms. Zepeda will transfer from bed to chair and chair to bed with INDEPENDENT using the least restrictive device within 7 treatment day(s). (3.)Ms. Zepeda will ambulate with SUPERVISION for 500+ feet with the least restrictive device within 7 treatment day(s) while maintaining normal vital signs. (4.)Ms. Zepeda will perform 1-2 steps with CGA within 7 treatment days for safety ascending and descending stairs for home. Goal met 12/10/20  _________________________________________________________________________________________       PHYSICAL THERAPY: Daily Note and AM Treatment Day # 6    Patricia Wilkins is a 67 y.o. female   PRIMARY DIAGNOSIS: S/P CABG x 3  Procedure(s) (LRB):  CORONARY ARTERY BYPASS GRAFT (CABG X3) LIMA / CLIPPING OF LEFT ATRIAL APPENDAGE (N/A)  VEIN HARVEST ENDOSCOPIC (Left)  6 Days Post-Op    ASSESSMENT:     REHAB RECOMMENDATIONS: CURRENT LEVEL OF FUNCTION:  (Most Recently Demonstrated)   Recommendation to date pending progress:  [address] [place] [address]  Equipment:    n/a Bed Mobility:   Not tested  Sit to Stand:   Standby Assistance  Transfers:   Standby Assistance  Gait/Mobility:   Contact Guard Assistance     ASSESSMENT:  Ms. Marshall Marrufo is sitting in the recliner and agreeable to therapy. Patient c/o soreness in her right armpit area. She  is doing better with her mobility and safety with functional mobility. Patient requires stand by assist  with sit to stand and hand held assist with  gait training. Ascend/descend stairs with use of handrail, did well.    Patient is assisted to and from the bathroom but is independent with pericare and washing her hands. Returned to the recliner and performed therapeutic exercises. Good session. Patient is making good progress towards goals. Left sitting in the recliner at he end of the treatment session. Will continue PT efforts. HHPT at discharge. Discharging home today.      SUBJECTIVE:   Ms. Massimo Zelaya states, \"OK\"    SOCIAL HISTORY/ LIVING ENVIRONMENT:   Home Environment: Private residence  # Steps to Enter: 1  One/Two Story Residence: One story  Living Alone: No()  Support Systems: Family member(s), Friends \ neighbors  OBJECTIVE:     PAIN: VITAL SIGNS: LINES/DRAINS:   Pre Treatment: Pain Screen  Pain Scale 1: Numeric (0 - 10)  Pain Intensity 1: 0  Post Treatment: no c/o pain     O2 Device: Room air     MOBILITY: I Mod I S SBA CGA Min Mod Max Total  NT x2 Comments:   Bed Mobility    Rolling [] [] [] [] [] [] [] [] [] [x] []    Supine to Sit [] [] [] [] [] [] [] [] [] [x] []    Scooting [] [] [] [] [] [] [] [] [] [x] []    Sit to Supine [] [] [] [] [] [] [] [] [] [x] []    Transfers    Sit to Stand [] [] [] [x] [] [] [] [] [] [] []    Bed to Chair [] [] [] [x] [] [] [] [] [] [] []    Stand to Sit [] [] [] [x] [] [] [] [] [] [] []    I=Independent, Mod I=Modified Independent, S=Supervision, SBA=Standby Assistance, CGA=Contact Guard Assistance,   Min=Minimal Assistance, Mod=Moderate Assistance, Max=Maximal Assistance, Total=Total Assistance, NT=Not Tested    GAIT: I Mod I S SBA CGA Min Mod Max Total  NT Comments:   Level of Assistance [] [] [] [x] [] [] [] [] [] []    Distance 200 feet     DME Rolling Walker    Gait Quality Slow trixie     I=Independent, Mod I=Modified Independent, S=Supervision, SBA=Standby Assistance, CGA=Contact Guard Assistance,   Min=Minimal Assistance, Mod=Moderate Assistance, Max=Maximal Assistance, Total=Total Assistance, NT=Not Tested    PLAN:   FREQUENCY/DURATION: PT Plan of Care: BID for duration of hospital stay or until stated goals are met, which ever comes first.  TREATMENT:     TREATMENT:   ($$ Therapeutic Activity: 8-22 mins  $$ Therapeutic Exercises: 8-22 mins    )  Therapeutic Activity (13 Minutes): Therapeutic activity included bed mobility, transfers and gait training with rolling walker on level surfaces to improve functional Mobility, Strength and Activity tolerance. Therapeutic Exercise (10 Minutes): Therapeutic exercises noted below to improve functional activity tolerance, strength and mobility.       Date:  12/08/20 Date:  12/09/20 Date:  12/10/20   ACTIVITY/EXERCISE AM PM AM PM AM PM   LAQ 15  15  15    Shoulder shrugs 15  15  15    Gluteal sets 15  15  15    marching 15  15  15    Ankle pumps 15  15  15    abduction 15  15  15             B = bilateral; AA = active assistive; A = active; P = passive    AFTER TREATMENT POSITION/PRECAUTIONS:  Chair, Needs within reach and RN notified    INTERDISCIPLINARY COLLABORATION:  RN/PCT and PT/PTA    TOTAL TREATMENT DURATION:  PT Patient Time In/Time Out  Time In: 0829  Time Out: 7332    Teresa Oconnor PTA

## 2020-12-10 NOTE — PROGRESS NOTES
Bedside shift change report given to Bevely Minoo RN (oncoming nurse) by Bety Stewart RN (offgoing nurse). Report included the following information SBAR, Kardex, OR Summary, Intake/Output, MAR, Recent Results and Cardiac Rhythm NSR.

## 2020-12-10 NOTE — DISCHARGE INSTRUCTIONS
DISCHARGE SUMMARY from Nurse    PATIENT INSTRUCTIONS:    Report the following to your surgeon:  · Excessive pain, swelling, redness or odor of or around the surgical area  · Temperature over 100.5  · Nausea and vomiting lasting longer than 4 hours or if unable to take medications  · Any signs of decreased circulation or nerve impairment to extremity: change in color, persistent  numbness, tingling, coldness or increase pain      *  Please give a list of your current medications to your Primary Care Provider. *  Please update this list whenever your medications are discontinued, doses are      changed, or new medications (including over-the-counter products) are added. *  Please carry medication information at all times in case of emergency situations. These are general instructions for a healthy lifestyle:    No smoking/ No tobacco products/ Avoid exposure to second hand smoke  Surgeon General's Warning:  Quitting smoking now greatly reduces serious risk to your health. Obesity, smoking, and sedentary lifestyle greatly increases your risk for illness    A healthy diet, regular physical exercise & weight monitoring are important for maintaining a healthy lifestyle    You may be retaining fluid if you have a history of heart failure or if you experience any of the following symptoms:  Weight gain of 3 pounds or more overnight or 5 pounds in a week, increased swelling in our hands or feet or shortness of breath while lying flat in bed. Please call your doctor as soon as you notice any of these symptoms; do not wait until your next office visit. The discharge information has been reviewed with the patient and caregiver. The patient and caregiver verbalized understanding.   Discharge medications reviewed with the patient and caregiver and appropriate educational materials and side effects teaching were provided. ___________________________________________________________________________________________________________________________________      Coronary Artery Bypass Graft: What to Expect at Home  Your Recovery     Coronary artery bypass graft (CABG) is surgery to treat coronary artery disease. The surgery helps blood make a detour, or bypass, around one or more narrowed or blocked coronary arteries. Coronary arteries are the blood vessels that bring blood to the heart. Your doctor did the surgery through a cut, called an incision, in your chest.  You will feel tired and sore for the first few weeks after surgery. You may have some brief, sharp pains on either side of your chest. Your chest, shoulders, and upper back may ache. The incision in your chest and the area where the healthy vein was taken may be sore or swollen. These symptoms usually get better after 4 to 6 weeks. You will probably be able to do many of your usual activities after 4 to 6 weeks. But for 2 to 3 months you will not be able to lift heavy objects or do activities that strain your chest or upper arm muscles. At first you may notice that you get tired easily and need to rest often. It may take 1 to 2 months to get your energy back. Some people find that they are more emotional after this surgery. You may cry easily or show emotion in ways that are unusual for you. This is common and may last for up to a year. Some people get depressed after the surgery. Talk with your doctor if you have sadness that continues or you are concerned about how you are feeling. Treatment and other support can help you feel better. Even though the surgery may improve your symptoms, you will still need to make changes in your lifestyle to lower your risk of a heart attack or stroke. It will be important to eat a heart-healthy diet, get regular exercise, not smoke, take your heart medicines, and reduce stress.   You will likely start a cardiac rehabilitation (rehab) program in the hospital. Dewain Fraction will continue with this rehab program after you go home to help you recover and prevent problems with your heart. Talk to your doctor about whether rehab is right for you. This care sheet gives you a general idea about how long it will take for you to recover. But each person recovers at a different pace. Follow the steps below to get better as quickly as possible. How can you care for yourself at home? Activity    · Rest when you feel tired. Getting enough sleep will help you recover. Try to sleep on your back for 4 to 6 weeks while your breastbone (sternum) heals. This usually takes about 4 to 6 weeks.     · Try to walk each day. Start by walking a little more than you did the day before. Bit by bit, increase the amount you walk. Walking boosts blood flow and helps prevent pneumonia and constipation.     · Avoid strenuous activities, such as bicycle riding, jogging, weight lifting, or heavy aerobic exercise, until your doctor says it is okay.     · For 3 months, avoid activities that strain your chest or upper arm muscles. This includes pushing a  or vacuum, mopping floors, or swinging a golf club or tennis racquet.     · For 2 to 3 months, avoid lifting anything that would make you strain. This may include a child, heavy grocery bags and milk containers, a heavy briefcase or backpack, or cat litter or dog food bags.     · Hold a pillow firmly over your chest incision when you cough or take deep breaths. This will support your chest and reduce your pain.     · Do breathing exercises at home as instructed by your doctor. This will help prevent pneumonia.     · Ask your doctor when you can drive again.     · You will probably need to take 4 to 12 weeks off from work. It depends on the type of work you do and how you feel.     · You may shower as usual. Pat the incision dry.  Do not take a bath for the first 3 weeks, or until your doctor tells you it is okay.     · Do not swim or use a hot tub for at least 1 month, or until your doctor says it is okay.     · Ask your doctor when it is okay for you to have sex. Diet    · Eat a heart-healthy diet. If you have not been eating this way, talk to your doctor. You also may want to talk to a dietitian. A dietitian can help you learn about healthy foods.     · Drink plenty of fluids (unless your doctor tells you not to).     · You may notice that your bowel movements are not regular right after your surgery. This is common. Try to avoid constipation and straining with bowel movements. You may want to take a fiber supplement every day. If you have not had a bowel movement after a couple of days, ask your doctor about taking a mild laxative. Medicines    · Your doctor will tell you if and when you can restart your medicines. He or she will also give you instructions about taking any new medicines.     · If you take aspirin or some other blood thinner, ask your doctor if and when to start taking it again. Make sure that you understand exactly what your doctor wants you to do.     · Your doctor may give you medicines to prevent blood clots, keep your heartbeat steady, and lower your blood pressure and cholesterol. Take your medicines exactly as prescribed. Call your doctor if you think you are having a problem with your medicine.     · Be safe with medicines. Take pain medicines exactly as directed. ? If the doctor gave you a prescription medicine for pain, take it as prescribed. ? If you are not taking a prescription pain medicine, ask your doctor if you can take an over-the-counter medicine. ? Do not take aspirin, ibuprofen (Advil, Motrin), naproxen (Aleve), or other nonsteroidal anti-inflammatory drugs (NSAIDs) unless your doctor says it is okay.     · If you think your pain medicine is making you sick to your stomach:  ? Take your medicine after meals (unless your doctor has told you not to). ?  Ask your doctor for a different pain medicine.     · If your doctor prescribed antibiotics, take them as directed. Do not stop taking them just because you feel better. You need to take the full course of antibiotics. Incision care    · If you have strips of tape on the incisions the doctor made, leave the tape on for a week or until it falls off.     · Wash the area daily with warm, soapy water, and pat it dry. Don't use hydrogen peroxide or alcohol, which can slow healing. You may cover the area with a gauze bandage if it weeps or rubs against clothing. Change the bandage every day.     · Keep the area clean and dry.     · Do not use any creams, lotions, powders, ointments, or oils unless your doctor tells you it is okay.     · If you have an incision in your leg:  ? Wear support stockings on your legs during the day for the first 2 weeks. You can take the stockings off at night while you sleep. ? Raise your legs above the level of your heart whenever you lie down for the first 4 to 6 weeks. Other instructions    · Keep track of your weight. Weigh yourself every day at the same time of day, on the same scale, in the same amount of clothing. A sudden increase in weight can be a sign of a problem with your heart. Tell your doctor if you suddenly gain weight, such as 3 pounds or more in 2 to 3 days.     · Do not smoke. Smoking can make it harder for you to recover. And it will raise the chances of your arteries narrowing again. If you need help quitting, talk to your doctor about stop-smoking programs and medicines. These can increase your chances of quitting for good. Follow-up care is a key part of your treatment and safety. Be sure to make and go to all appointments, and call your doctor if you are having problems. It's also a good idea to know your test results and keep a list of the medicines you take. When should you call for help? Call 911 anytime you think you may need emergency care.  For example, call if:    · You passed out (lost consciousness).     · You have severe trouble breathing.     · You have sudden chest pain and shortness of breath, or you cough up blood.     · You have severe pain in your chest.     · You have symptoms of a heart attack. These may include:  ? Chest pain or pressure, or a strange feeling in the chest.  ? Sweating. ? Shortness of breath. ? Nausea or vomiting. ? Pain, pressure, or a strange feeling in the back, neck, jaw, or upper belly or in one or both shoulders or arms. ? Lightheadedness or sudden weakness. ? A fast or irregular heartbeat. After you call 911, the  may tell you to chew 1 adult-strength or 2 to 4 low-dose aspirin. Wait for an ambulance. Do not try to drive yourself.     · You have angina symptoms (such as chest pain or pressure) that do not go away with rest or are not getting better within 5 minutes after you take a dose of nitroglycerin. Call your doctor now or seek immediate medical care if:    · You have pain that does not get better after you take pain medicine.     · You have a fever over 100°F.     · You have loose stitches, or your incision comes open.     · Bright red blood has soaked through the bandage over your incision.     · You have signs of infection, such as:  ? Increased pain, swelling, warmth, or redness. ? Red streaks leading from the incision. ? Pus draining from the incision. ? Swollen lymph nodes in your neck, armpits, or groin. ? A fever.     · You have signs of a blood clot in a leg. If you had a vein removed from your leg, you may have tenderness and swelling while your leg heals. But signs of a blood clot may be in a different part of your leg and may include:  ? Pain in your calf, back of the knee, thigh, or groin. ? Redness and swelling in your leg or groin.     · Your heartbeat feels very fast or slow, skips beats, or flutters.     · You are dizzy or lightheaded, or you feel like you may faint.     · You have new or increased shortness of breath. Watch closely for changes in your health, and be sure to contact your doctor if:    · You gain weight suddenly, such as 3 pounds or more in 2 to 3 days.     · You have increased swelling in your legs, ankles, or feet.     · You have any concerns about your incision.     · You feel very sad or have other signs of depression, such as trouble sleeping or eating.     · You have questions about diet, exercise, quitting smoking, or stress reduction after surgery. Where can you learn more? Go to http://www.gray.com/  Enter F759 in the search box to learn more about \"Coronary Artery Bypass Graft: What to Expect at Home. \"  Current as of: December 16, 2019               Content Version: 12.6  © 7568-3432 [employer]. Care instructions adapted under license by [employer] (which disclaims liability or warranty for this information). If you have questions about a medical condition or this instruction, always ask your healthcare professional. [place] [address] any warranty or liability for your use of this information. Coronary Artery Disease: Care Instructions  Your Care Instructions     The heart is a muscle, and like any muscle, it needs blood to work well. Coronary artery disease occurs when the arteries that bring oxygen-rich blood to your heart have a buildup of plaque--deposits of fats and other substances. Plaque can reduce blood flow to the heart muscle. This can cause angina symptoms such as chest pain or pressure. A heart attack can happen if blood flow is completely blocked. You can do a lot to improve your health and prevent a heart attack. Eating healthy food, not smoking, getting regular exercise, and taking your medicine are the main things you can do every day to stay healthy. Follow-up care is a key part of your treatment and safety. Be sure to make and go to all appointments, and call your doctor if you are having problems.  It's also a good idea to know your test results and keep a list of the medicines you take. How can you care for yourself at home? Medicines    · Be safe with medicines. Take your medicines exactly as prescribed. Call your doctor if you think you are having a problem with your medicine. You will get more details on the specific medicines your doctor prescribes.     · You will take medicines that lower your risk of a heart attack and lower your risk of dying early from heart disease. These medicines include:  ? Angiotensin-converting enzyme (ACE) inhibitors or angiotensin II receptor blockers (ARBs). They lower blood pressure. ? Aspirin and other blood thinners. They prevent blood clots that could cause a heart attack. ? Beta-blockers. They lower the heart's workload. ? Statins and other cholesterol medicines. They lower cholesterol.     · If your doctor has given you nitroglycerin for angina symptoms (such as chest pain or pressure) keep it with you at all times. If you have symptoms, sit down and rest, and take the first dose of nitroglycerin as directed. If your symptoms get worse or are not getting better within 5 minutes, call 911 right away. Stay on the phone. The emergency  will give you further instructions.     · Do not take any over-the-counter medicines, vitamins, or herbal products without talking to your doctor first.   Lifestyle  Ask your doctor if a cardiac rehab program is right for you. Cardiac rehab can help you make lifestyle changes. In cardiac rehab, a team of health professionals provides education and support to help you make new, healthy habits.    · Do not smoke. Avoid secondhand smoke too. Smoking can increase your risk of a heart attack or stroke. If you need help quitting, talk to your doctor about stop-smoking programs and medicines. These can increase your chances of quitting for good.     · Eat heart-healthy foods.  These include vegetables, fruits, nuts, beans, lean meat, fish, and whole grains. Limit saturated fat, sodium, and alcohol. Limit drinks and foods with added sugar.     · If your doctor recommends it, get more exercise. Ask your doctor what level of exercise is safe for you. Walking is a good choice. Bit by bit, increase the amount you walk every day. Try for at least 30 minutes on most days of the week. You also may want to swim, bike, or do other activities.     · Stay at a healthy weight. Lose weight if you need to.     · Manage other health problems. These include diabetes, high blood pressure, and high cholesterol. If you think you may have a problem with alcohol or drug use, talk to your doctor.     · If you have angina symptoms, pay attention to your symptoms. This can help you see what causes them and what is typical for you.     · Avoid colds and flu. Get a pneumococcal vaccine shot. If you have had one before, ask your doctor whether you need another dose. Get a flu vaccine every year. If you must be around people with colds or flu, wash your hands often.     · If you think you have symptoms of depression, talk to your doctor. Symptoms include feeling sad or hopeless most of the time, or losing interest in activities that used to make you happy. When should you call for help? Call 911 anytime you think you may need emergency care. For example, call if:    · You have symptoms of a heart attack. These may include:  ? Chest pain or pressure, or a strange feeling in the chest.  ? Sweating. ? Shortness of breath. ? Nausea or vomiting. ? Pain, pressure, or a strange feeling in the back, neck, jaw, or upper belly or in one or both shoulders or arms. ? Lightheadedness or sudden weakness. ? A fast or irregular heartbeat. After you call 911, the  may tell you to chew 1 adult-strength or 2 to 4 low-dose aspirin. Wait for an ambulance.  Do not try to drive yourself.     · You have angina symptoms (such as chest pain or pressure) that do not go away with rest or are not getting better within 5 minutes after you take a dose of nitroglycerin.     · You passed out (lost consciousness). Call your doctor now or seek immediate medical care if:    · You are having angina symptoms, such as chest pain or pressure, more often than usual, or they are different or worse than usual.     · You have new or increased shortness of breath.     · You are dizzy or lightheaded, or you feel like you may faint. Watch closely for changes in your health, and be sure to contact your doctor if you have any problems. Where can you learn more? Go to http://www.gray.com/  Enter A838 in the search box to learn more about \"Coronary Artery Disease: Care Instructions. \"  Current as of: December 16, 2019               Content Version: 12.6  © 2027-1453 [employer]. Care instructions adapted under license by [employer] (which disclaims liability or warranty for this information). If you have questions about a medical condition or this instruction, always ask your healthcare professional. [address] any warranty or liability for your use of this information. Heart-Healthy Diet: Care Instructions  Your Care Instructions     A heart-healthy diet has lots of vegetables, fruits, nuts, beans, and whole grains, and is low in salt. It limits foods that are high in saturated fat, such as meats, cheeses, and fried foods. It may be hard to change your diet, but even small changes can lower your risk of heart attack and heart disease. Follow-up care is a key part of your treatment and safety. Be sure to make and go to all appointments, and call your doctor if you are having problems. It's also a good idea to know your test results and keep a list of the medicines you take. How can you care for yourself at home? Watch your portions  · Learn what a serving is. A \"serving\" and a \"portion\" are not always the same thing.  Make sure that you are not eating larger portions than are recommended. For example, a serving of pasta is ½ cup. A serving size of meat is 2 to 3 ounces. A 3-ounce serving is about the size of a deck of cards. Measure serving sizes until you are good at [place]" them. Keep in mind that restaurants often serve portions that are 2 or 3 times the size of one serving. · To keep your energy level up and keep you from feeling hungry, eat often but in smaller portions. · Eat only the number of calories you need to stay at a healthy weight. If you need to lose weight, eat fewer calories than your body burns (through exercise and other physical activity). Eat more fruits and vegetables  · Eat a variety of fruit and vegetables every day. Dark green, deep orange, red, or yellow fruits and vegetables are especially good for you. Examples include spinach, carrots, peaches, and berries. · Keep carrots, celery, and other veggies handy for snacks. Buy fruit that is in season and store it where you can see it so that you will be tempted to eat it. · Cook dishes that have a lot of veggies in them, such as stir-fries and soups. Limit saturated and trans fat  · Read food labels, and try to avoid saturated and trans fats. They increase your risk of heart disease. · Use olive or canola oil when you cook. · Bake, broil, grill, or steam foods instead of frying them. · Choose lean meats instead of high-fat meats such as hot dogs and sausages. Cut off all visible fat when you prepare meat. · Eat fish, skinless poultry, and meat alternatives such as soy products instead of high-fat meats. Soy products, such as tofu, may be especially good for your heart. · Choose low-fat or fat-free milk and dairy products. Eat foods high in fiber  · Eat a variety of grain products every day. Include whole-grain foods that have lots of fiber and nutrients. Examples of whole-grain foods include oats, whole wheat bread, and brown rice.   · Buy whole-grain breads and cereals, instead of white bread or pastries. Limit salt and sodium  · Limit how much salt and sodium you eat to help lower your blood pressure. · Taste food before you salt it. Add only a little salt when you think you need it. With time, your taste buds will adjust to less salt. · Eat fewer snack items, fast foods, and other high-salt, processed foods. Check food labels for the amount of sodium in packaged foods. · Choose low-sodium versions of canned goods (such as soups, vegetables, and beans). Limit sugar  · Limit drinks and foods with added sugar. These include candy, desserts, and soda pop. Limit alcohol  · Limit alcohol to no more than 2 drinks a day for men and 1 drink a day for women. Too much alcohol can cause health problems. When should you call for help? Watch closely for changes in your health, and be sure to contact your doctor if:    · You would like help planning heart-healthy meals. Where can you learn more? Go to http://www.vazquez.com/  Enter V137 in the search box to learn more about \"Heart-Healthy Diet: Care Instructions. \"  Current as of: August 22, 2019               Content Version: 12.6  © 4705-4754 [employer]. Care instructions adapted under license by [employer] (which disclaims liability or warranty for this information). If you have questions about a medical condition or this instruction, always ask your healthcare professional. [place] [address] any warranty or liability for your use of this information. Patient Education        Cardiac Rehabilitation: Care Instructions  Your Care Instructions     Cardiac rehabilitation is a program for people who have a heart problem, such as a heart attack, heart failure, or a heart valve disease. The program includes exercise, lifestyle changes, education, and emotional support. Cardiac rehab can help you improve the quality of your life through better overall health. It can help you lose weight and feel better about yourself. On your cardiac rehab team, you may have your doctor, a nurse specialist, a dietitian, and a physical therapist. They will design your cardiac rehab program specifically for you. You will learn how to reduce your risk for heart problems, how to manage stress, and how to eat a heart-healthy diet. By the end of the program, you will be ready to maintain a healthier lifestyle on your own. Follow-up care is a key part of your treatment and safety. Be sure to make and go to all appointments, and call your doctor if you are having problems. It's also a good idea to know your test results and keep a list of the medicines you take. How can you care for yourself at home? · Take your medicines exactly as prescribed. Call your doctor if you think you are having a problem with your medicine. You will get more details on the specific medicines your doctor prescribes. · Weigh yourself every day if your doctor tells you to. Watch for sudden weight gain. Weigh yourself on the same scale with the same amount of clothing at the same time of day. · Plan your meals so that you are eating heart-healthy foods. ? Eat a variety of foods daily. Fresh fruits and vegetables and whole-grains are good choices. ? Limit your fat intake, especially saturated and trans fat. ? Limit salt (sodium). ? Increase fiber in your diet. ? Limit alcohol. · Learn how to take your pulse so that you can track your heart rate during exercise. · Always check with your doctor before you begin a new exercise program.  · Warm up before you exercise and cool down afterward for at least 15 minutes each. This will help your heart gradually prepare for and recover from exercise and avoid pushing your heart too hard. · Stop exercising if you have any unusual discomfort, such as chest pain. · Do not smoke. Smoking can make heart problems worse.  If you need help quitting, talk to your doctor about stop-smoking programs and medicines. These can increase your chances of quitting for good. When should you call for help? Call 911 anytime you think you may need emergency care. For example, call if:    · You have severe trouble breathing.     · You cough up pink, foamy mucus and you have trouble breathing.     · You have symptoms of a heart attack. These may include:  ? Chest pain or pressure, or a strange feeling in the chest.  ? Sweating. ? Shortness of breath. ? Nausea or vomiting. ? Pain, pressure, or a strange feeling in the back, neck, jaw, or upper belly or in one or both shoulders or arms. ? Lightheadedness or sudden weakness. ? A fast or irregular heartbeat. After you call 911, the  may tell you to chew 1 adult-strength or 2 to 4 low-dose aspirin. Wait for an ambulance. Do not try to drive yourself.     · You have angina symptoms (such as chest pain or pressure) that do not go away with rest or are not getting better within 5 minutes after you take a dose of nitroglycerin.     · You have symptoms of a stroke. These may include:  ? Sudden numbness, tingling, weakness, or loss of movement in your face, arm, or leg, especially on only one side of your body. ? Sudden vision changes. ? Sudden trouble speaking. ? Sudden confusion or trouble understanding simple statements. ? Sudden problems with walking or balance. ? A sudden, severe headache that is different from past headaches.     · You passed out (lost consciousness). Call your doctor now or seek immediate medical care if:    · You have new or increased shortness of breath.     · You are dizzy or lightheaded, or you feel like you may faint.     · You gain weight suddenly, such as more than 2 to 3 pounds in a day or 5 pounds in a week. (Your doctor may suggest a different range of weight gain.)     · You have increased swelling in your legs, ankles, or feet.    Watch closely for changes in your health, and be sure to contact your doctor if you have any problems. Where can you learn more? Go to http://www.gray.com/  Enter D709 in the search box to learn more about \"Cardiac Rehabilitation: Care Instructions. \"  Current as of: December 16, 2019               Content Version: 12.6  © 7421-9596 [employer]. Care instructions adapted under license by [employer] (which disclaims liability or warranty for this information). If you have questions about a medical condition or this instruction, always ask your healthcare professional. [place] [address] any warranty or liability for your use of this information. Patient Education   Carvedilol (By mouth)   Carvedilol (mrn-RR-aye-ol)  Treats high blood pressure and heart failure. Also reduces the risk of death after a heart attack. This medicine is a beta-blocker. Brand Name(s): Coreg, Coreg CR   There may be other brand names for this medicine. When This Medicine Should Not Be Used: This medicine is not right for everyone. Do not use it if you had an allergic reaction to carvedilol, or if you have asthma, severe liver disease, or certain heart problems. Ask your doctor about these heart problems. How to Use This Medicine:   Long Acting Capsule, Tablet  · Take your medicine as directed. Your dose may need to be changed several times to find what works best for you. · It is best to take this medicine with food or milk. · Extended-release capsule instructions:   ¨ Take the capsule in the morning with food. ¨ Swallow the capsule whole. Do not crush or chew it. ¨ If you cannot swallow the capsule, you may open it and sprinkle the medicine over a spoonful of applesauce. Swallow the applesauce right away. · Read and follow the patient instructions that come with this medicine. Talk to your doctor or pharmacist if you have any questions.   · Store the medicine in a closed container at room temperature, away from heat, moisture, and direct light. · Missed dose: Take a dose as soon as you remember. If it is almost time for your next dose, wait until then and take a regular dose. Do not take extra medicine to make up for a missed dose. Drugs and Foods to Avoid:   Ask your doctor or pharmacist before using any other medicine, including over-the-counter medicines, vitamins, and herbal products. · Some medicines can affect how carvedilol works. Tell your doctor if you are using amiodarone, clonidine, diltiazem, cyclosporine, digoxin, fluconazole, reserpine, rifampin, verapamil, or an MAO inhibitor (MAOI). Warnings While Using This Medicine:   · Tell your doctor if you are pregnant or breastfeeding, or if you have kidney disease, liver disease, bradycardia (slow heartbeat), coronary artery disease, circulation problems, edema (fluid retention or swelling), heart or blood vessel problems, low blood pressure, lung problems (such as bronchitis or emphysema), an overactive thyroid, pheochromocytoma, or frequent chest pains. Tell your doctor if you have a history of severe allergic reactions or if you are scheduled to have surgery. · This medicine may cause the following problems:   ¨ Changes to your blood sugar level (if you have diabetes, report any blood sugar level changes to your doctor)  ¨ Fewer tears than usual in contact lens wearers  ¨ An eye problem called Intraoperative Floppy Iris Syndrome during cataract surgery  · This medicine may make you dizzy or drowsy. Do not drive, use machines, or do anything else that could be dangerous if you are not alert. · Do not stop using this medicine suddenly. Your doctor will need to slowly decrease your dose before you stop it completely. · Keep all medicine out of the reach of children. Never share your medicine with anyone.   Possible Side Effects While Using This Medicine:   Call your doctor right away if you notice any of these side effects:  · Allergic reaction: Itching or hives, swelling in your face or hands, swelling or tingling in your mouth or throat, chest tightness, trouble breathing  · Change in how much or how often you urinate  · Chest pain that may spread to your arms, jaw, back, or neck, trouble breathing, nausea, unusual sweating, faintness  · Leg pain when you walk, legs and feet that feel cold or numb  · Lightheadedness, dizziness, or fainting  · Rapid weight gain, swelling in your hands, ankles, or feet  · Shaking, trembling, sweating, hunger, confusion  · Slow, fast, or uneven heartbeat  · Unusual bleeding or bruising  · Wheezing or trouble breathing  If you notice these less serious side effects, talk with your doctor:   · Diarrhea  · Trouble having sex  · Unusual tiredness or weakness  If you notice other side effects that you think are caused by this medicine, tell your doctor. Call your doctor for medical advice about side effects. You may report side effects to FDA at 1-236-ELX-7145  © 2017 [facility] Information is for End User's use only and may not be sold, redistributed or otherwise used for commercial purposes. The above information is an  only. It is not intended as medical advice for individual conditions or treatments. Talk to your doctor, nurse or pharmacist before following any medical regimen to see if it is safe and effective for you. Patient Education   Losartan (By mouth)   Losartan (harvey-DANTE-tan)  Treats high blood pressure. Reduces the risk of stroke in patients with high blood pressure and an enlarged heart. Treats kidney disease in patients with diabetes. This medicine is an angiotensin receptor blocker (ARB). Brand Name(s): Cozaar   There may be other brand names for this medicine. When This Medicine Should Not Be Used: This medicine is not right for everyone. Do not use it if you had an allergic reaction to losartan, or if you are pregnant. Do not use this medicine together with aliskiren if you have diabetes.   How to Use This Medicine:   Tablet  · Take your medicine as directed. Your dose may need to be changed several times to find what works best for you. · Drink plenty of fluids if you exercise, sweat more than usual, or have diarrhea or vomiting. · Read and follow the patient instructions that come with this medicine. Talk to your doctor or pharmacist if you have any questions. · Missed dose: Take a dose as soon as you remember. If it is almost time for your next dose, wait until then and take a regular dose. Do not take extra medicine to make up for a missed dose. · Store the medicine in a closed container at room temperature, away from heat, moisture, and direct light. Drugs and Foods to Avoid:   Ask your doctor or pharmacist before using any other medicine, including over-the-counter medicines, vitamins, and herbal products. · Some medicines can affect how losartan works. Tell your doctor if you are using any of the following:   ¨ Lithium  ¨ Rifampin  ¨ A diuretic (water pill), such as spironolactone, triamterene, or amiloride  ¨ NSAID pain or arthritis medicine, such as aspirin, diclofenac, ibuprofen, or naproxen  ¨ Another blood pressure medicine, such as aliskiren, benazepril, enalapril, or lisinopril  · Ask your doctor before you use any medicine, supplement, or salt substitute that contains potassium. Warnings While Using This Medicine:   · It is not safe to take this medicine during pregnancy. It could harm an unborn baby. Tell your doctor right away if you become pregnant. · Tell your doctor if you are breastfeeding, or if you have kidney disease, liver disease, congestive heart failure, or diabetes. Tell your doctor if you have had angioedema that was caused by a blood pressure medicine. · This medicine could lower your blood pressure too much, especially when you first use it or if you are dehydrated. Stand or sit up slowly if you feel lightheaded or dizzy.   · Your doctor will do lab tests at regular visits to check on the effects of this medicine. Keep all appointments. · Keep all medicine out of the reach of children. Never share your medicine with anyone. Possible Side Effects While Using This Medicine:   Call your doctor right away if you notice any of these side effects:  · Allergic reaction: Itching or hives, swelling in your face or hands, swelling or tingling in your mouth or throat, chest tightness, trouble breathing  · Change in how much or how often you urinate  · Confusion, weakness, uneven heartbeat, trouble breathing, numbness or tingling in your hands, feet, or lips  · Lightheadedness, dizziness, fainting  · Rapid weight gain, swelling in your hands, ankles, or feet  If you notice these less serious side effects, talk with your doctor:   · Diarrhea  · Tiredness  If you notice other side effects that you think are caused by this medicine, tell your doctor. Call your doctor for medical advice about side effects. You may report side effects to FDA at 3-312-FDA-0416  © 2017 [address] Information is for End User's use only and may not be sold, redistributed or otherwise used for commercial purposes. The above information is an  only. It is not intended as medical advice for individual conditions or treatments. Talk to your doctor, nurse or pharmacist before following any medical regimen to see if it is safe and effective for you.

## 2020-12-10 NOTE — PROGRESS NOTES
CM made aware that patient needed RW and BSC. Order placed. Pw signed and faxed to [facility], DME's delivered to patient's room. CM spoke with patient's daughter previously about [facility] and she advised that if the patient needed it, to send the referral to [facility]. Recommendation for [facility] has been made from PT. Order/referral sent to [facility]. Patient advised that they will be giving her a call. No other needs have been identified at this time. Care Management Interventions  PCP Verified by CM: Yes(Dr. Jevon Strong MD.)  Mode of Transport at Discharge:  Other (see comment)(Family)  Transition of Care Consult (CM Consult): Discharge Planning  Discharge Durable Medical Equipment: Yes(BSC and RW from [facility])  Physical Therapy Consult: Yes  Occupational Therapy Consult: No  Speech Therapy Consult: No  Current Support Network: Own Home, Family Lives Nearby, Lives with Spouse  Confirm Follow Up Transport: Family  The Plan for Transition of Care is Related to the Following Treatment Goals : Patient return to her baseline  Discharge Location  Discharge Placement: Home

## 2020-12-10 NOTE — PROGRESS NOTES
[facility] CARDIOLOGY PROGRESS NOTE           12/10/2020 9:51 AM    Admit Date: 11/29/2020         Subjective: Doing well today. Renal function has improved. NTproBNP is elevated but she remains off oxygen and is euvolemic on exam.    ROS:  Cardiovascular:  As noted above    Objective:      Vitals:    12/09/20 2337 12/10/20 0334 12/10/20 0538 12/10/20 0708   BP: (!) 152/68 139/65  (!) 175/72   Pulse: 70 68  69   Resp: 18 18  18   Temp: 98.3 °F (36.8 °C) 98.1 °F (36.7 °C)  97.6 °F (36.4 °C)   SpO2: 97% 96%  97%   Weight:   175 lb 3.2 oz (79.5 kg)    Height:             Physical Exam:  General: Well Developed, Well Nourished, No Acute Distress, Alert & Oriented x 3, Appropriate mood  Neck: supple, no JVD  Heart: S1S2 with RRR without murmurs or gallops  Lungs: Clear throughout auscultation bilaterally without adventitious sounds  Abd: soft, nontender, nondistended, with good bowel sounds  Ext: no edema bilaterally  Skin: warm and dry      Data Review:   Recent Labs     12/10/20  0319 12/09/20  0346 12/08/20  0406    131* 130*   K 4.4 4.4 3.9   MG  --   --  2.1   BUN 21 26* 31*   CREA 1.15* 1.34* 1.64*   * 117* 109*   WBC 6.9 6.7 8.2   HGB 9.9* 9.7* 10.3*   HCT 29.7* 30.0* 30.5*    244 185       No results for input(s): TNIPOC, TROIQ in the last 72 hours. 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 No obvious source identified. Coronary atherosclerosis of native coronary vessel (3/21/2017)      Overview:       1. Cardiac  Calcium score (12/28/08) : Total: 218. LAD: 129, Lcx: 29,       RCA: 60      2. Lexiscan Cardiolite (3/9/10):  Apical thinning. No ischemia. Normal       LV function EF 68%. Nonrheumatic aortic valve stenosis (10/15/2020)      Overview: Echo (11/6/20):  EF 60-65%. +DD. Mild LAE. Aortic Stenosis:  MG 13.  PG       22.  DI 0.45. Mild MR/TR. NSTEMI (non-ST elevated myocardial infarction) ([facility] [address] (11/29/2020)      Hypomagnesemia (11/29/2020)      Syncope and collapse (11/29/2020)      Encounter for weaning from ventilator ([facility] [address] (12/4/2020)      Hypoxia (12/4/2020)    A/P  1) CAD - post op from CABG continue asa/statin  2) HTN - controlled  3) DIETER - improved. Will hold off on lasix for now making good urine and expect wt to decrease as she mobilizes fluid.       Manuela Haas MD  12/10/2020 9:51 AM

## 2020-12-17 PROBLEM — I21.4 NSTEMI (NON-ST ELEVATED MYOCARDIAL INFARCTION) (HCC): Status: RESOLVED | Noted: 2020-11-29 | Resolved: 2020-12-17

## 2020-12-17 PROBLEM — I25.5 ISCHEMIC CARDIOMYOPATHY: Status: ACTIVE | Noted: 2020-12-17

## 2020-12-17 PROBLEM — I65.29 CAROTID ARTERY STENOSIS WITHOUT CEREBRAL INFARCTION: Status: RESOLVED | Noted: 2017-03-21 | Resolved: 2020-12-17

## 2020-12-18 PROBLEM — R60.0 LOCALIZED EDEMA: Status: ACTIVE | Noted: 2020-12-18

## 2021-01-04 ENCOUNTER — HOSPITAL ENCOUNTER (OUTPATIENT)
Dept: CARDIAC REHAB | Age: 76
Discharge: HOME | End: 2021-01-04

## 2021-01-04 NOTE — ROUTINE PROCESS
Dear Dr. Quique Rodrigez,    Thank you for referring your patient,Mrs. Audelia Zepeda ( 1945), to the Cardiopulmonary Rehabilitation Program at [address] [place] [address]. She is a good candidate for the Cardiac Rehab Program and should see improvements with regular participation. We will be addressing appropriate interventions for modifiable risk factors with your patient during the next 12 weeks. We will contact you with any issues or concerns that may arise, or you can follow your patient's progress through [address] [place] [address] at any time. A final summary will be sent to you when the program is completed. Again, thank you for the referral. If we can be of further assistance, please feel free to contact the Cardiopulmonary Rehab staff at [phone].     Sincerely,    NINOSKA GrafN, RN  Cardiopulmonary Rehabilitation Nurse Liaison  [facility]

## 2021-01-12 ENCOUNTER — HOSPITAL ENCOUNTER (OUTPATIENT)
Dept: CARDIAC REHAB | Age: 76
Discharge: HOME | End: 2021-01-12
Payer: MEDICARE

## 2021-01-12 VITALS — HEIGHT: 63 IN | BODY MASS INDEX: 26.75 KG/M2 | WEIGHT: 151 LBS

## 2021-01-12 PROCEDURE — 93798 PHYS/QHP OP CAR RHAB W/ECG: CPT

## 2021-01-13 ENCOUNTER — HOSPITAL ENCOUNTER (OUTPATIENT)
Dept: CARDIAC REHAB | Age: 76
Discharge: HOME | End: 2021-01-13
Payer: MEDICARE

## 2021-01-13 PROCEDURE — 93798 PHYS/QHP OP CAR RHAB W/ECG: CPT

## 2021-01-18 ENCOUNTER — HOSPITAL ENCOUNTER (OUTPATIENT)
Dept: CARDIAC REHAB | Age: 76
Discharge: HOME | End: 2021-01-18
Payer: MEDICARE

## 2021-01-18 PROCEDURE — 93798 PHYS/QHP OP CAR RHAB W/ECG: CPT

## 2021-01-20 ENCOUNTER — HOSPITAL ENCOUNTER (OUTPATIENT)
Dept: CARDIAC REHAB | Age: 76
Discharge: HOME | End: 2021-01-20
Payer: MEDICARE

## 2021-01-20 VITALS — WEIGHT: 151.2 LBS | BODY MASS INDEX: 26.78 KG/M2

## 2021-01-20 PROCEDURE — 93798 PHYS/QHP OP CAR RHAB W/ECG: CPT

## 2021-01-25 ENCOUNTER — HOSPITAL ENCOUNTER (OUTPATIENT)
Dept: CARDIAC REHAB | Age: 76
Discharge: HOME | End: 2021-01-25
Payer: MEDICARE

## 2021-01-25 PROCEDURE — 93798 PHYS/QHP OP CAR RHAB W/ECG: CPT

## 2021-01-27 ENCOUNTER — HOSPITAL ENCOUNTER (OUTPATIENT)
Dept: CARDIAC REHAB | Age: 76
Discharge: HOME | End: 2021-01-27
Payer: MEDICARE

## 2021-01-27 ENCOUNTER — HOSPITAL ENCOUNTER (OUTPATIENT)
Dept: CARDIAC REHAB | Age: 76
Discharge: HOME | End: 2021-01-27

## 2021-01-27 VITALS — WEIGHT: 152.6 LBS | BODY MASS INDEX: 27.03 KG/M2

## 2021-01-27 PROCEDURE — 93798 PHYS/QHP OP CAR RHAB W/ECG: CPT

## 2021-02-01 ENCOUNTER — HOSPITAL ENCOUNTER (OUTPATIENT)
Dept: CARDIAC REHAB | Age: 76
Discharge: HOME | End: 2021-02-01
Payer: MEDICARE

## 2021-02-01 PROCEDURE — 93798 PHYS/QHP OP CAR RHAB W/ECG: CPT

## 2021-02-03 ENCOUNTER — HOSPITAL ENCOUNTER (OUTPATIENT)
Dept: CARDIAC REHAB | Age: 76
Discharge: HOME | End: 2021-02-03
Payer: MEDICARE

## 2021-02-03 VITALS — BODY MASS INDEX: 26.89 KG/M2 | WEIGHT: 151.8 LBS

## 2021-02-03 PROCEDURE — 93798 PHYS/QHP OP CAR RHAB W/ECG: CPT

## 2021-02-08 ENCOUNTER — HOSPITAL ENCOUNTER (OUTPATIENT)
Dept: CARDIAC REHAB | Age: 76
Discharge: HOME | End: 2021-02-08
Payer: MEDICARE

## 2021-02-08 PROCEDURE — 93798 PHYS/QHP OP CAR RHAB W/ECG: CPT

## 2021-02-10 ENCOUNTER — APPOINTMENT (OUTPATIENT)
Dept: CARDIAC REHAB | Age: 76
End: 2021-02-10
Payer: MEDICARE

## 2021-02-15 ENCOUNTER — HOSPITAL ENCOUNTER (OUTPATIENT)
Dept: CARDIAC REHAB | Age: 76
Discharge: HOME | End: 2021-02-15
Payer: MEDICARE

## 2021-02-15 PROCEDURE — 93798 PHYS/QHP OP CAR RHAB W/ECG: CPT

## 2021-02-17 ENCOUNTER — HOSPITAL ENCOUNTER (OUTPATIENT)
Dept: CARDIAC REHAB | Age: 76
Discharge: HOME | End: 2021-02-17
Payer: MEDICARE

## 2021-02-17 VITALS — BODY MASS INDEX: 27.03 KG/M2 | WEIGHT: 152.6 LBS

## 2021-02-17 PROCEDURE — 93798 PHYS/QHP OP CAR RHAB W/ECG: CPT

## 2021-02-22 ENCOUNTER — HOSPITAL ENCOUNTER (OUTPATIENT)
Dept: CARDIAC REHAB | Age: 76
Discharge: HOME | End: 2021-02-22
Payer: MEDICARE

## 2021-02-22 PROCEDURE — 93798 PHYS/QHP OP CAR RHAB W/ECG: CPT

## 2021-02-24 ENCOUNTER — HOSPITAL ENCOUNTER (OUTPATIENT)
Dept: CARDIAC REHAB | Age: 76
Discharge: HOME | End: 2021-02-24
Payer: MEDICARE

## 2021-02-24 VITALS — WEIGHT: 151.2 LBS | BODY MASS INDEX: 26.78 KG/M2

## 2021-02-24 PROCEDURE — 93798 PHYS/QHP OP CAR RHAB W/ECG: CPT

## 2021-03-01 ENCOUNTER — HOSPITAL ENCOUNTER (OUTPATIENT)
Dept: CARDIAC REHAB | Age: 76
Discharge: HOME | End: 2021-03-01
Payer: MEDICARE

## 2021-03-01 PROCEDURE — 93798 PHYS/QHP OP CAR RHAB W/ECG: CPT

## 2021-03-03 ENCOUNTER — HOSPITAL ENCOUNTER (OUTPATIENT)
Dept: CARDIAC REHAB | Age: 76
Discharge: HOME | End: 2021-03-03
Payer: MEDICARE

## 2021-03-03 VITALS — WEIGHT: 153.2 LBS | BODY MASS INDEX: 27.14 KG/M2

## 2021-03-03 PROCEDURE — 93798 PHYS/QHP OP CAR RHAB W/ECG: CPT

## 2021-03-08 ENCOUNTER — HOSPITAL ENCOUNTER (OUTPATIENT)
Dept: CARDIAC REHAB | Age: 76
Discharge: HOME | End: 2021-03-08
Payer: MEDICARE

## 2021-03-08 PROCEDURE — 93798 PHYS/QHP OP CAR RHAB W/ECG: CPT

## 2021-03-10 ENCOUNTER — HOSPITAL ENCOUNTER (OUTPATIENT)
Dept: CARDIAC REHAB | Age: 76
Discharge: HOME | End: 2021-03-10
Payer: MEDICARE

## 2021-03-10 VITALS — WEIGHT: 153.8 LBS | BODY MASS INDEX: 27.24 KG/M2

## 2021-03-10 PROCEDURE — 93798 PHYS/QHP OP CAR RHAB W/ECG: CPT

## 2021-03-15 ENCOUNTER — HOSPITAL ENCOUNTER (OUTPATIENT)
Dept: CARDIAC REHAB | Age: 76
Discharge: HOME | End: 2021-03-15
Payer: MEDICARE

## 2021-03-15 PROCEDURE — 93798 PHYS/QHP OP CAR RHAB W/ECG: CPT

## 2021-03-17 ENCOUNTER — HOSPITAL ENCOUNTER (OUTPATIENT)
Dept: CARDIAC REHAB | Age: 76
Discharge: HOME | End: 2021-03-17
Payer: MEDICARE

## 2021-03-17 VITALS — WEIGHT: 153.2 LBS | BODY MASS INDEX: 27.14 KG/M2

## 2021-03-17 PROCEDURE — 93798 PHYS/QHP OP CAR RHAB W/ECG: CPT

## 2021-03-22 ENCOUNTER — HOSPITAL ENCOUNTER (OUTPATIENT)
Dept: CARDIAC REHAB | Age: 76
Discharge: HOME | End: 2021-03-22
Payer: MEDICARE

## 2021-03-22 PROCEDURE — 93798 PHYS/QHP OP CAR RHAB W/ECG: CPT

## 2021-03-24 ENCOUNTER — HOSPITAL ENCOUNTER (OUTPATIENT)
Dept: CARDIAC REHAB | Age: 76
Discharge: HOME | End: 2021-03-24
Payer: MEDICARE

## 2021-03-24 VITALS — BODY MASS INDEX: 27.56 KG/M2 | WEIGHT: 155.6 LBS

## 2021-03-24 PROCEDURE — 93798 PHYS/QHP OP CAR RHAB W/ECG: CPT

## 2021-03-29 ENCOUNTER — HOSPITAL ENCOUNTER (OUTPATIENT)
Dept: CARDIAC REHAB | Age: 76
Discharge: HOME | End: 2021-03-29
Payer: MEDICARE

## 2021-03-29 PROCEDURE — 93798 PHYS/QHP OP CAR RHAB W/ECG: CPT

## 2021-03-31 ENCOUNTER — HOSPITAL ENCOUNTER (OUTPATIENT)
Dept: CARDIAC REHAB | Age: 76
Discharge: HOME | End: 2021-03-31
Payer: MEDICARE

## 2021-03-31 VITALS — BODY MASS INDEX: 27.53 KG/M2 | WEIGHT: 155.4 LBS

## 2021-03-31 PROCEDURE — 93798 PHYS/QHP OP CAR RHAB W/ECG: CPT

## 2021-04-05 ENCOUNTER — HOSPITAL ENCOUNTER (OUTPATIENT)
Dept: CARDIAC REHAB | Age: 76
Discharge: HOME | End: 2021-04-05
Payer: MEDICARE

## 2021-04-05 PROCEDURE — 93798 PHYS/QHP OP CAR RHAB W/ECG: CPT

## 2021-04-07 ENCOUNTER — HOSPITAL ENCOUNTER (OUTPATIENT)
Dept: CARDIAC REHAB | Age: 76
Discharge: HOME | End: 2021-04-07
Payer: MEDICARE

## 2021-04-07 VITALS — WEIGHT: 156 LBS | BODY MASS INDEX: 27.63 KG/M2

## 2021-04-07 PROCEDURE — 93798 PHYS/QHP OP CAR RHAB W/ECG: CPT

## 2021-04-12 ENCOUNTER — HOSPITAL ENCOUNTER (OUTPATIENT)
Dept: CARDIAC REHAB | Age: 76
Discharge: HOME | End: 2021-04-12
Payer: MEDICARE

## 2021-04-12 PROCEDURE — 93798 PHYS/QHP OP CAR RHAB W/ECG: CPT

## 2021-04-14 ENCOUNTER — HOSPITAL ENCOUNTER (OUTPATIENT)
Dept: CARDIAC REHAB | Age: 76
Discharge: HOME | End: 2021-04-14
Payer: MEDICARE

## 2021-04-14 VITALS — BODY MASS INDEX: 27.56 KG/M2 | WEIGHT: 155.6 LBS

## 2021-04-14 PROCEDURE — 93798 PHYS/QHP OP CAR RHAB W/ECG: CPT

## 2021-04-19 ENCOUNTER — HOSPITAL ENCOUNTER (OUTPATIENT)
Dept: CARDIAC REHAB | Age: 76
Discharge: HOME | End: 2021-04-19
Payer: MEDICARE

## 2021-04-19 PROCEDURE — 93798 PHYS/QHP OP CAR RHAB W/ECG: CPT

## 2021-04-21 ENCOUNTER — HOSPITAL ENCOUNTER (OUTPATIENT)
Dept: CARDIAC REHAB | Age: 76
Discharge: HOME | End: 2021-04-21
Payer: MEDICARE

## 2021-04-21 VITALS — WEIGHT: 155.4 LBS | BODY MASS INDEX: 27.53 KG/M2

## 2021-04-21 PROCEDURE — 93798 PHYS/QHP OP CAR RHAB W/ECG: CPT

## 2021-04-26 ENCOUNTER — HOSPITAL ENCOUNTER (OUTPATIENT)
Dept: CARDIAC REHAB | Age: 76
Discharge: HOME | End: 2021-04-26
Payer: MEDICARE

## 2021-04-26 PROCEDURE — 93798 PHYS/QHP OP CAR RHAB W/ECG: CPT

## 2021-04-28 ENCOUNTER — HOSPITAL ENCOUNTER (OUTPATIENT)
Dept: CARDIAC REHAB | Age: 76
Discharge: HOME | End: 2021-04-28
Payer: MEDICARE

## 2021-04-28 PROCEDURE — 93798 PHYS/QHP OP CAR RHAB W/ECG: CPT

## 2021-05-03 ENCOUNTER — APPOINTMENT (OUTPATIENT)
Dept: CARDIAC REHAB | Age: 76
End: 2021-05-03

## 2021-05-05 ENCOUNTER — APPOINTMENT (OUTPATIENT)
Dept: CARDIAC REHAB | Age: 76
End: 2021-05-05

## 2021-05-10 ENCOUNTER — APPOINTMENT (OUTPATIENT)
Dept: CARDIAC REHAB | Age: 76
End: 2021-05-10

## 2021-05-12 ENCOUNTER — APPOINTMENT (OUTPATIENT)
Dept: CARDIAC REHAB | Age: 76
End: 2021-05-12

## 2021-05-13 ENCOUNTER — TELEPHONE (OUTPATIENT)
Dept: CARDIAC REHAB | Age: 76
End: 2021-05-13

## 2021-05-13 NOTE — TELEPHONE ENCOUNTER
Spoke to patient. She had infusion last Friday and will have another this Friday for anemia. She plans to return to cardiac rehab when she is feeling better.

## 2021-05-24 ENCOUNTER — APPOINTMENT (OUTPATIENT)
Dept: CARDIAC REHAB | Age: 76
End: 2021-05-24

## 2021-05-26 ENCOUNTER — HOSPITAL ENCOUNTER (OUTPATIENT)
Dept: CARDIAC REHAB | Age: 76
End: 2021-05-26

## 2021-05-27 ENCOUNTER — APPOINTMENT (OUTPATIENT)
Dept: CARDIAC REHAB | Age: 76
End: 2021-05-27

## 2021-06-02 ENCOUNTER — HOSPITAL ENCOUNTER (OUTPATIENT)
Dept: CARDIAC REHAB | Age: 76
Discharge: HOME | End: 2021-06-02

## 2021-06-03 ENCOUNTER — APPOINTMENT (OUTPATIENT)
Dept: CARDIAC REHAB | Age: 76
End: 2021-06-03

## 2021-06-07 ENCOUNTER — APPOINTMENT (OUTPATIENT)
Dept: CARDIAC REHAB | Age: 76
End: 2021-06-07

## 2021-06-09 ENCOUNTER — APPOINTMENT (OUTPATIENT)
Dept: CARDIAC REHAB | Age: 76
End: 2021-06-09

## 2021-06-10 ENCOUNTER — APPOINTMENT (OUTPATIENT)
Dept: CARDIAC REHAB | Age: 76
End: 2021-06-10

## 2021-06-14 ENCOUNTER — APPOINTMENT (OUTPATIENT)
Dept: CARDIAC REHAB | Age: 76
End: 2021-06-14

## 2021-06-16 ENCOUNTER — APPOINTMENT (OUTPATIENT)
Dept: CARDIAC REHAB | Age: 76
End: 2021-06-16

## 2021-06-17 ENCOUNTER — APPOINTMENT (OUTPATIENT)
Dept: CARDIAC REHAB | Age: 76
End: 2021-06-17

## 2021-06-21 ENCOUNTER — APPOINTMENT (OUTPATIENT)
Dept: CARDIAC REHAB | Age: 76
End: 2021-06-21

## 2021-06-23 ENCOUNTER — APPOINTMENT (OUTPATIENT)
Dept: CARDIAC REHAB | Age: 76
End: 2021-06-23

## 2022-03-18 PROBLEM — I25.5 ISCHEMIC CARDIOMYOPATHY: Status: ACTIVE | Noted: 2020-12-17

## 2022-03-18 PROBLEM — I25.10 CORONARY ATHEROSCLEROSIS OF NATIVE CORONARY VESSEL: Status: ACTIVE | Noted: 2017-03-21

## 2022-03-18 PROBLEM — D50.9 IRON DEFICIENCY ANEMIA: Status: ACTIVE | Noted: 2017-03-21

## 2022-03-19 PROBLEM — R60.0 LOCALIZED EDEMA: Status: ACTIVE | Noted: 2020-12-18

## 2022-03-19 PROBLEM — Z99.11 ENCOUNTER FOR WEANING FROM VENTILATOR (HCC): Status: ACTIVE | Noted: 2020-12-04

## 2022-03-19 PROBLEM — Z98.890 POST-OPERATIVE NAUSEA AND VOMITING: Status: ACTIVE | Noted: 2017-04-01

## 2022-03-19 PROBLEM — I65.21 CAROTID STENOSIS, RIGHT: Status: ACTIVE | Noted: 2017-05-15

## 2022-03-19 PROBLEM — R11.2 POST-OPERATIVE NAUSEA AND VOMITING: Status: ACTIVE | Noted: 2017-04-01

## 2022-03-19 PROBLEM — E83.42 HYPOMAGNESEMIA: Status: ACTIVE | Noted: 2020-11-29

## 2022-03-19 PROBLEM — R09.02 HYPOXIA: Status: ACTIVE | Noted: 2020-12-04

## 2022-03-19 PROBLEM — I65.22 CAROTID STENOSIS, LEFT: Status: ACTIVE | Noted: 2017-03-30

## 2022-03-19 PROBLEM — Z95.1 S/P CABG X 3: Status: ACTIVE | Noted: 2020-12-04

## 2022-03-20 PROBLEM — R55 SYNCOPE AND COLLAPSE: Status: ACTIVE | Noted: 2020-11-29

## 2022-03-20 PROBLEM — I35.0 NONRHEUMATIC AORTIC VALVE STENOSIS: Status: ACTIVE | Noted: 2020-10-15

## 2022-04-13 PROBLEM — I65.23 BILATERAL CAROTID ARTERY STENOSIS: Status: ACTIVE | Noted: 2017-03-30

## 2022-04-25 ENCOUNTER — HOSPITAL ENCOUNTER (OUTPATIENT)
Dept: LAB | Age: 77
Discharge: HOME | End: 2022-04-25
Payer: MEDICARE

## 2022-04-25 DIAGNOSIS — E78.5 DYSLIPIDEMIA: Chronic | ICD-10-CM

## 2022-04-25 LAB
CHOLEST SERPL-MCNC: 233 MG/DL
HDLC SERPL-MCNC: 35 MG/DL (ref 40–60)
HDLC SERPL: 6.7 {RATIO}
LDLC SERPL CALC-MCNC: 133.8 MG/DL
TRIGL SERPL-MCNC: 321 MG/DL (ref 35–150)
VLDLC SERPL CALC-MCNC: 64.2 MG/DL (ref 6–23)

## 2022-04-25 PROCEDURE — 36415 COLL VENOUS BLD VENIPUNCTURE: CPT

## 2022-04-25 PROCEDURE — 80061 LIPID PANEL: CPT

## 2022-04-26 NOTE — PROGRESS NOTES
Please call patient. Let her know her cholesterol is significantly elevated. Total cholesterol is 233 compared to 150 in the past and her bad cholesterol is 133 where it had been 88 in the past.  Our goal for her bad cholesterol is 60-80. See if we can get her approved for Repatha.   Thank you  Thank you

## 2022-10-27 ENCOUNTER — OFFICE VISIT (OUTPATIENT)
Dept: CARDIOLOGY CLINIC | Age: 77
End: 2022-10-27
Payer: MEDICARE

## 2022-10-27 VITALS
WEIGHT: 159.2 LBS | DIASTOLIC BLOOD PRESSURE: 88 MMHG | HEART RATE: 64 BPM | HEIGHT: 63 IN | BODY MASS INDEX: 28.21 KG/M2 | SYSTOLIC BLOOD PRESSURE: 162 MMHG

## 2022-10-27 DIAGNOSIS — I25.10 ATHEROSCLEROSIS OF NATIVE CORONARY ARTERY OF NATIVE HEART WITHOUT ANGINA PECTORIS: Primary | ICD-10-CM

## 2022-10-27 DIAGNOSIS — I35.0 NONRHEUMATIC AORTIC VALVE STENOSIS: ICD-10-CM

## 2022-10-27 DIAGNOSIS — I77.9 CAROTID ARTERY DISEASE, UNSPECIFIED LATERALITY, UNSPECIFIED TYPE (HCC): ICD-10-CM

## 2022-10-27 DIAGNOSIS — I10 PRIMARY HYPERTENSION: ICD-10-CM

## 2022-10-27 PROCEDURE — G8400 PT W/DXA NO RESULTS DOC: HCPCS | Performed by: INTERNAL MEDICINE

## 2022-10-27 PROCEDURE — 3074F SYST BP LT 130 MM HG: CPT | Performed by: INTERNAL MEDICINE

## 2022-10-27 PROCEDURE — G8417 CALC BMI ABV UP PARAM F/U: HCPCS | Performed by: INTERNAL MEDICINE

## 2022-10-27 PROCEDURE — 3078F DIAST BP <80 MM HG: CPT | Performed by: INTERNAL MEDICINE

## 2022-10-27 PROCEDURE — G8484 FLU IMMUNIZE NO ADMIN: HCPCS | Performed by: INTERNAL MEDICINE

## 2022-10-27 PROCEDURE — 1123F ACP DISCUSS/DSCN MKR DOCD: CPT | Performed by: INTERNAL MEDICINE

## 2022-10-27 PROCEDURE — 1036F TOBACCO NON-USER: CPT | Performed by: INTERNAL MEDICINE

## 2022-10-27 PROCEDURE — G8427 DOCREV CUR MEDS BY ELIG CLIN: HCPCS | Performed by: INTERNAL MEDICINE

## 2022-10-27 PROCEDURE — 99214 OFFICE O/P EST MOD 30 MIN: CPT | Performed by: INTERNAL MEDICINE

## 2022-10-27 PROCEDURE — 1090F PRES/ABSN URINE INCON ASSESS: CPT | Performed by: INTERNAL MEDICINE

## 2022-10-27 RX ORDER — FUROSEMIDE 40 MG/1
40 TABLET ORAL DAILY PRN
Qty: 30 TABLET | Refills: 3 | Status: SHIPPED | OUTPATIENT
Start: 2022-10-27

## 2022-10-27 RX ORDER — CARVEDILOL 3.12 MG/1
3.12 TABLET ORAL 2 TIMES DAILY WITH MEALS
Qty: 180 TABLET | Refills: 3 | Status: SHIPPED | OUTPATIENT
Start: 2022-10-27

## 2022-10-27 RX ORDER — LOSARTAN POTASSIUM 25 MG/1
25 TABLET ORAL DAILY
Qty: 90 TABLET | Refills: 3 | Status: SHIPPED | OUTPATIENT
Start: 2022-10-27

## 2022-10-27 ASSESSMENT — ENCOUNTER SYMPTOMS: SHORTNESS OF BREATH: 0

## 2022-10-27 NOTE — PROGRESS NOTES
[address] [place], [place]  [address], [address] [facility], [address] [place] [address]  PHONE: [phone]    Shantelle Shi  1945      SUBJECTIVE:   Shantelle Shi is a 65 y.o. female seen for a follow up visit regarding the following:     Chief Complaint   Patient presents with    Coronary Artery Disease    Hypertension       HPI:    Shantelle Shi presents for routine follow up for known Coronary Artery Disease. Multiple issues addressed as outlined below:     Coronary Artery Disease:  Patient denies any recent angina. Notes compliance with medical therapy. No recent NTG use. No intolerance to anti-platelet therapy. Hypertension: BP elevated in office. At home today, /67. Has white coat HTN. Patient reports compliance with medical therapy without side effects. Hyperlipidemia:  Unable to tolerate statins. Last lipid panel:  Tchol 233, , HDL 35. She does not have prescription plan    Anemia:  Still getting iron transfusion. Approximately every 3 months. Past Medical History, Past Surgical History, Family history, Social History, and Medications were all reviewed with the patient today and updated as necessary.            Current Outpatient Medications:     losartan (COZAAR) 25 MG tablet, Take 1 tablet by mouth daily, Disp: 90 tablet, Rfl: 3    carvedilol (COREG) 3.125 MG tablet, Take 1 tablet by mouth 2 times daily (with meals), Disp: 180 tablet, Rfl: 3    furosemide (LASIX) 40 MG tablet, Take 1 tablet by mouth daily as needed (edema.), Disp: 30 tablet, Rfl: 3    acetaminophen (TYLENOL) 325 MG tablet, Take 325 mg by mouth as needed, Disp: , Rfl:     aspirin 81 MG EC tablet, Take 81 mg by mouth, Disp: , Rfl:     esomeprazole (NEXIUM) 40 MG delayed release capsule, Take 20 mg by mouth, Disp: , Rfl:     nitroGLYCERIN (NITROSTAT) 0.4 MG SL tablet, Place 0.4 mg under the tongue, Disp: , Rfl:     potassium chloride (KLOR-CON M) 10 MEQ extended release tablet, Take 10 mEq by mouth daily as needed, Disp: , Rfl:     traMADol (ULTRAM) 50 MG tablet, Take 50 mg by mouth every 6 hours as needed. , Disp: , Rfl:     vitamin E 400 UNIT capsule, Take 400 Units by mouth, Disp: , Rfl:      Allergies   Allergen Reactions    Metoprolol Swelling     Pt states extremity swelling when taking this medication    Rosuvastatin Other (See Comments)     Pt states caused severe \"gout\" pain. Patient Active Problem List    Diagnosis Date Noted    Localized edema 12/18/2020     Priority: Low    Ischemic cardiomyopathy 12/17/2020     Priority: Low    Encounter for weaning from ventilator ([facility] 12/04/2020     Priority: Low    Hypoxia 12/04/2020     Priority: Low    S/P CABG x 3 12/04/2020     Priority: Low    Cancer ([facility] [address]      Priority: Low     MULTIPLE MYELOMA        Hypomagnesemia 11/29/2020     Priority: Low    Syncope and collapse 11/29/2020     Priority: Low    Hypertension      Priority: Low    Nonrheumatic aortic valve stenosis 10/15/2020     Priority: Low     1.  Echo (11/6/20):  EF 60-65%. +DD. Mild LAE. Aortic Stenosis:  MG 13.    PG 22.  DI 0.45. Mild MR/TR. 2.  Echo (11/29/20):  EF 40-45%. Hypokinesis of inferolateral/apical   walls. Mild AS. MG 14. PG 24. Mild TR.  3.  Echo (4/9/21):  EF 55-60%. Mild bi-atrial enlargment. Normal RV. Mild AS. MG 13. PG 21. Mild MR/TR. Post-operative nausea and vomiting 04/01/2017     Priority: Low    Bilateral carotid artery stenosis 03/30/2017     Priority: Low     1.  3/30/17 (Dr Blake Mclaughlin) Left carotid endarterectomy. 2.  5/15/17 (Dr. Blake Mclaughlin) Right carotid endarterectomy. 3.  Carotid duplex (4/12/2022): Mild bilateral ICA stenosis (<50%). Moderate ECA stenosis. Coronary atherosclerosis of native coronary vessel 03/21/2017     Priority: Low     1. Cardiac  Calcium score (12/28/08) : Total: 218. LAD: 129, Lcx: 29,   RCA: 60  2. Admitted with syncope and NSTEMI (11/30/20)  3.  CABG (12/4/20):  LIMA to Diagonal, SVG to OM, SVG to PDA. Clipping of   LUZ (no preop afib). Iron deficiency anemia 2017     Priority: Low      :  Has requires frequent transfusions. No obvious source identified. Hernandez's esophagus 12/10/2013     Priority: Low    CKD (chronic kidney disease), stage III ([facility] [address] 12/10/2013     Priority: Low    Carot[facility] artery disease ([facility] 12/10/2013     Priority: Low     Asymptomatic bilateral 50-69%        PAD (peripheral artery disease) ([facility] 12/10/2013     Priority: Low     1. Left iliac stent in 2006. Anemia 12/10/2013     Priority: Low    Dyslipidemia 12/10/2013     Priority: Low     Unable to tolerate therapy. Social History     Tobacco Use    Smoking status: Former     Packs/day: 1.00     Types: Cigarettes     Quit date: 1977     Years since quittin.1    Smokeless tobacco: Never   Substance Use Topics    Alcohol use: No       ROS:    Review of Systems   Constitutional: Negative for malaise/fatigue. Cardiovascular:  Negative for chest pain. Respiratory:  Negative for shortness of breath. Musculoskeletal:  Positive for arthritis. Neurological:  Negative for focal weakness. Psychiatric/Behavioral:  Negative for depression. PHYSICAL EXAM:  Wt Readings from Last 3 Encounters:   10/27/22 159 lb 3.2 oz (72.2 kg)   22 161 lb 6.4 oz (73.2 kg)   22 161 lb (73 kg)     BP Readings from Last 3 Encounters:   10/27/22 (!) 162/88   22 122/60   10/15/21 (!) 160/82     Pulse Readings from Last 3 Encounters:   10/27/22 64   22 70   10/15/21 60       Physical Exam  Constitutional:       General: She is not in acute distress. Appearance: Normal appearance. Neck:      Vascular: No carotid bruit. Cardiovascular:      Rate and Rhythm: Normal rate and regular rhythm. Pulmonary:      Breath sounds: Normal breath sounds. No wheezing. Abdominal:      General: There is no distension. Palpations: Abdomen is soft.    Musculoskeletal:         General: No swelling. Skin:     General: Skin is warm and dry. Neurological:      General: No focal deficit present. Psychiatric:         Mood and Affect: Mood normal.       Medical problems and test results were reviewed with the patient today. Lab Results   Component Value Date    WBC 6.9 12/10/2020    HGB 9.9 (L) 12/10/2020    HCT 29.7 (L) 12/10/2020    MCV 86.3 12/10/2020     12/10/2020       Lab Results   Component Value Date/Time     12/10/2020 03:19 AM    K 4.4 12/10/2020 03:19 AM     12/10/2020 03:19 AM    CO2 20 12/10/2020 03:19 AM    BUN 21 12/10/2020 03:19 AM    CREATININE 1.15 12/10/2020 03:19 AM    GLUCOSE 115 12/10/2020 03:19 AM    CALCIUM 9.0 12/10/2020 03:19 AM        Lab Results   Component Value Date    CHOL 233 (H) 04/25/2022     Lab Results   Component Value Date    TRIG 321 (H) 04/25/2022     Lab Results   Component Value Date    HDL 35 (L) 04/25/2022     Lab Results   Component Value Date    LDLCALC 133.8 (H) 04/25/2022     Lab Results   Component Value Date    LABVLDL 64.2 (H) 04/25/2022     Lab Results   Component Value Date    CHOLHDLRATIO 6.7 04/25/2022        Data from outside records/labs from outside providers have been reviewed and summarized as noted in the HPI, past history and data review sections of this note       ASSESSMENT and PLAN      1. Atherosclerosis of native coronary artery of native heart without angina pectoris  Patient is doing well without any anginal symptoms. Continue Aspirin 81 mg a day and PRN NTG. We discussed the importance of continued risk factor modification. The patient is encouraged to contact my office with any worsening dyspnea or chest discomfort.    - Transthoracic echocardiogram (TTE) complete with contrast, bubble, strain, and 3D PRN; Future    2. Carotid artery disease, unspecified laterality, unspecified type ([address]  Check carotid duplex prior to her next visit in 6 months. - Vascular duplex carotid bilateral; Future    3. Nonrheumatic aortic valve stenosis  Check echo in 6 months to evaluate aortic stenosis. - Transthoracic echocardiogram (TTE) complete with contrast, bubble, strain, and 3D PRN; Future    4. Primary hypertension  Whitecoat hypertension. Blood pressure controlled at home. Continue current medications. Encouraged her to check the accuracy of her blood pressure cuff. - furosemide (LASIX) 40 MG tablet; Take 1 tablet by mouth daily as needed (edema.)  Dispense: 30 tablet; Refill: 3           No follow-ups on file. Kristofer Sigala MD  10/27/2022  1:59 PM    This note may have been dictated using speech recognition software.   As a result, error of speech recognition may have occurred

## 2023-03-02 ENCOUNTER — HOSPITAL ENCOUNTER (OUTPATIENT)
Dept: MAMMOGRAPHY | Age: 78
Discharge: HOME | End: 2023-03-02
Payer: MEDICARE

## 2023-03-02 DIAGNOSIS — Z12.31 SCREENING MAMMOGRAM FOR BREAST CANCER: ICD-10-CM

## 2023-03-02 PROCEDURE — 77067 SCR MAMMO BI INCL CAD: CPT

## 2023-04-20 PROBLEM — I35.0 NONRHEUMATIC AORTIC VALVE STENOSIS: Status: ACTIVE | Noted: 2020-10-15

## 2023-04-20 PROBLEM — I25.10 CORONARY ATHEROSCLEROSIS OF NATIVE CORONARY VESSEL: Status: ACTIVE | Noted: 2017-03-21

## 2023-04-30 PROBLEM — I65.23 BILATERAL CAROTID ARTERY STENOSIS: Status: ACTIVE | Noted: 2017-03-30

## 2023-05-01 ENCOUNTER — OFFICE VISIT (OUTPATIENT)
Dept: CARDIOLOGY CLINIC | Age: 78
End: 2023-05-01
Payer: MEDICARE

## 2023-05-01 VITALS
HEART RATE: 71 BPM | HEIGHT: 63 IN | BODY MASS INDEX: 28.35 KG/M2 | SYSTOLIC BLOOD PRESSURE: 130 MMHG | DIASTOLIC BLOOD PRESSURE: 80 MMHG | WEIGHT: 160 LBS

## 2023-05-01 DIAGNOSIS — I25.10 ATHEROSCLEROSIS OF NATIVE CORONARY ARTERY OF NATIVE HEART WITHOUT ANGINA PECTORIS: ICD-10-CM

## 2023-05-01 DIAGNOSIS — I10 PRIMARY HYPERTENSION: Primary | ICD-10-CM

## 2023-05-01 DIAGNOSIS — I35.0 NONRHEUMATIC AORTIC VALVE STENOSIS: ICD-10-CM

## 2023-05-01 DIAGNOSIS — I65.23 BILATERAL CAROTID ARTERY STENOSIS: ICD-10-CM

## 2023-05-01 DIAGNOSIS — D50.9 IRON DEFICIENCY ANEMIA, UNSPECIFIED IRON DEFICIENCY ANEMIA TYPE: ICD-10-CM

## 2023-05-01 PROCEDURE — G8427 DOCREV CUR MEDS BY ELIG CLIN: HCPCS | Performed by: INTERNAL MEDICINE

## 2023-05-01 PROCEDURE — 1090F PRES/ABSN URINE INCON ASSESS: CPT | Performed by: INTERNAL MEDICINE

## 2023-05-01 PROCEDURE — 1123F ACP DISCUSS/DSCN MKR DOCD: CPT | Performed by: INTERNAL MEDICINE

## 2023-05-01 PROCEDURE — 1036F TOBACCO NON-USER: CPT | Performed by: INTERNAL MEDICINE

## 2023-05-01 PROCEDURE — G8400 PT W/DXA NO RESULTS DOC: HCPCS | Performed by: INTERNAL MEDICINE

## 2023-05-01 PROCEDURE — 3079F DIAST BP 80-89 MM HG: CPT | Performed by: INTERNAL MEDICINE

## 2023-05-01 PROCEDURE — 3075F SYST BP GE 130 - 139MM HG: CPT | Performed by: INTERNAL MEDICINE

## 2023-05-01 PROCEDURE — 99214 OFFICE O/P EST MOD 30 MIN: CPT | Performed by: INTERNAL MEDICINE

## 2023-05-01 PROCEDURE — G8417 CALC BMI ABV UP PARAM F/U: HCPCS | Performed by: INTERNAL MEDICINE

## 2023-05-01 PROCEDURE — 93000 ELECTROCARDIOGRAM COMPLETE: CPT | Performed by: INTERNAL MEDICINE

## 2023-05-01 RX ORDER — NITROGLYCERIN 0.4 MG/1
0.4 TABLET SUBLINGUAL EVERY 5 MIN PRN
Qty: 25 TABLET | Refills: 1 | Status: SHIPPED | OUTPATIENT
Start: 2023-05-01

## 2023-05-01 ASSESSMENT — ENCOUNTER SYMPTOMS: SHORTNESS OF BREATH: 0

## 2023-05-01 NOTE — PROGRESS NOTES
[address] [place] [address], [address] [facility], [address] [place] [address]  PHONE: [phone]    Luis A Samaniego  1945      SUBJECTIVE:   Luis A Samaniego is a 65 y.o. female seen for a follow up visit regarding the following:     Chief Complaint   Patient presents with    Results     Echo and carotid    Coronary Artery Disease    Hypertension       HPI:    Luis A Samaniego presents for routine follow up for known Coronary Artery Disease. Multiple issues addressed as outlined below:     Coronary Artery Disease:  Patient denies any recent angina. Notes compliance with medical therapy. No recent NTG use. No intolerance to anti-platelet therapy. Hypertension:  Ambulatory BP readings have been controlled. Patient reports compliance with medical therapy without side effects. Carotid artery disease:  Recent duplex:  Carotid duplex (4/17/2023): Mild (less than 50%) bilateral internal carotid artery stenosis. Normal antegrade flow in vertebral arteries. Elevated velocities consistent with bilateral external carotid artery stenosis. Hyperlipidemia:  Unable to tolerate statins. No prescription plan and cannot afford Repatha/Praluent. Aortic Stenosis:  Recent echo. Echo (4/19/2023): EF 55%. Mild AS. MG 11.  PG 20. Mild TR    Anemia:  Still doing iron transfusion. 4 this year. Past Medical History, Past Surgical History, Family history, Social History, and Medications were all reviewed with the patient today and updated as necessary.            Current Outpatient Medications:     nitroGLYCERIN (NITROSTAT) 0.4 MG SL tablet, Place 1 tablet under the tongue every 5 minutes as needed for Chest pain, Disp: 25 tablet, Rfl: 1    losartan (COZAAR) 25 MG tablet, Take 1 tablet by mouth daily, Disp: 90 tablet, Rfl: 3    carvedilol (COREG) 3.125 MG tablet, Take 1 tablet by mouth 2 times daily (with meals), Disp: 180 tablet, Rfl: 3    furosemide (LASIX) 40 MG tablet, Take 1 tablet by mouth daily as

## 2023-05-02 ENCOUNTER — TELEPHONE (OUTPATIENT)
Dept: CARDIOLOGY CLINIC | Age: 78
End: 2023-05-02

## 2023-05-02 NOTE — TELEPHONE ENCOUNTER
Spoke with rep darcie Davis, she suggested that patient call [phone] and see what programs patient would possibly qualify for. I called [phone], spoke with representative, she suggested that patient call Anel Aldrich at [phone] or [employer]  Patient notified of above voiced understanding and will call me back with what she decides to do and with what Anel Aldrich told her//atif

## 2023-05-02 NOTE — TELEPHONE ENCOUNTER
Called pharmacy, spoke with Annel Posada, he ran both Praluent and repatha and states that product is not covered. Will call and speak with Repatha rep to see what programs they may have//brendab    ----- Message from Kirk Marks MD sent at 5/1/2023 12:16 PM EDT -----  Is there any way to see if she could qualify for patient assistance with Praluent or [employer]. She cannot tolerate statins and does not have a prescription plan. Any help would be appreciated.   Thank you

## 2023-10-31 ENCOUNTER — TELEPHONE (OUTPATIENT)
Dept: CARDIOLOGY CLINIC | Age: 78
End: 2023-10-31

## 2023-10-31 RX ORDER — LOSARTAN POTASSIUM 25 MG/1
25 TABLET ORAL DAILY
Qty: 30 TABLET | Refills: 0 | Status: SHIPPED | OUTPATIENT
Start: 2023-10-31 | End: 2023-11-03 | Stop reason: ALTCHOICE

## 2023-10-31 NOTE — TELEPHONE ENCOUNTER
Patient states that she was in [facility] due to passing out after colonoscopy. Patient states that her losartan was increased to 50 mg, she has one more day of pills left and would like to have prescription sent to pharmacy. Patient informed that I can send her in a 30 day supply of losartan 25 mg until she comes in on Friday.  Patient voiced understanding//chanellndab  Requested Prescriptions     Signed Prescriptions Disp Refills    losartan (COZAAR) 25 MG tablet 30 tablet 0     Sig: Take 1 tablet by mouth daily     Authorizing Provider: Sylvie Newsome     Ordering User: Bertin Morales

## 2023-10-31 NOTE — TELEPHONE ENCOUNTER
Pt called in stating the hospital upped her losartan it went up to 50MG but did not give her a rx she is out and does not have enough to cover until she comes in on Friday please send to [facility] in [place].

## 2023-11-03 ENCOUNTER — OFFICE VISIT (OUTPATIENT)
Age: 78
End: 2023-11-03
Payer: MEDICARE

## 2023-11-03 VITALS
DIASTOLIC BLOOD PRESSURE: 68 MMHG | HEART RATE: 62 BPM | BODY MASS INDEX: 27.85 KG/M2 | WEIGHT: 157.2 LBS | SYSTOLIC BLOOD PRESSURE: 138 MMHG | HEIGHT: 63 IN

## 2023-11-03 DIAGNOSIS — N18.32 STAGE 3B CHRONIC KIDNEY DISEASE (HCC): ICD-10-CM

## 2023-11-03 DIAGNOSIS — D50.9 IRON DEFICIENCY ANEMIA, UNSPECIFIED IRON DEFICIENCY ANEMIA TYPE: ICD-10-CM

## 2023-11-03 DIAGNOSIS — I35.0 NONRHEUMATIC AORTIC VALVE STENOSIS: ICD-10-CM

## 2023-11-03 DIAGNOSIS — I25.10 ATHEROSCLEROSIS OF NATIVE CORONARY ARTERY OF NATIVE HEART WITHOUT ANGINA PECTORIS: Primary | ICD-10-CM

## 2023-11-03 DIAGNOSIS — E78.5 DYSLIPIDEMIA: ICD-10-CM

## 2023-11-03 DIAGNOSIS — I10 PRIMARY HYPERTENSION: ICD-10-CM

## 2023-11-03 PROCEDURE — G8484 FLU IMMUNIZE NO ADMIN: HCPCS | Performed by: INTERNAL MEDICINE

## 2023-11-03 PROCEDURE — 1090F PRES/ABSN URINE INCON ASSESS: CPT | Performed by: INTERNAL MEDICINE

## 2023-11-03 PROCEDURE — G8417 CALC BMI ABV UP PARAM F/U: HCPCS | Performed by: INTERNAL MEDICINE

## 2023-11-03 PROCEDURE — G8427 DOCREV CUR MEDS BY ELIG CLIN: HCPCS | Performed by: INTERNAL MEDICINE

## 2023-11-03 PROCEDURE — G8399 PT W/DXA RESULTS DOCUMENT: HCPCS | Performed by: INTERNAL MEDICINE

## 2023-11-03 PROCEDURE — 3075F SYST BP GE 130 - 139MM HG: CPT | Performed by: INTERNAL MEDICINE

## 2023-11-03 PROCEDURE — 1036F TOBACCO NON-USER: CPT | Performed by: INTERNAL MEDICINE

## 2023-11-03 PROCEDURE — 99214 OFFICE O/P EST MOD 30 MIN: CPT | Performed by: INTERNAL MEDICINE

## 2023-11-03 PROCEDURE — 3078F DIAST BP <80 MM HG: CPT | Performed by: INTERNAL MEDICINE

## 2023-11-03 PROCEDURE — 1123F ACP DISCUSS/DSCN MKR DOCD: CPT | Performed by: INTERNAL MEDICINE

## 2023-11-03 RX ORDER — LOSARTAN POTASSIUM 50 MG/1
50 TABLET ORAL DAILY
Qty: 90 TABLET | Refills: 3 | Status: SHIPPED | OUTPATIENT
Start: 2023-11-03

## 2023-11-03 RX ORDER — CARVEDILOL 3.12 MG/1
3.12 TABLET ORAL 2 TIMES DAILY WITH MEALS
Qty: 180 TABLET | Refills: 3 | Status: SHIPPED | OUTPATIENT
Start: 2023-11-03

## 2023-11-03 RX ORDER — FUROSEMIDE 40 MG/1
40 TABLET ORAL DAILY PRN
Qty: 30 TABLET | Refills: 3 | Status: SHIPPED | OUTPATIENT
Start: 2023-11-03

## 2023-11-03 ASSESSMENT — ENCOUNTER SYMPTOMS: SHORTNESS OF BREATH: 0

## 2023-11-03 NOTE — PROGRESS NOTES
Last 3 Encounters:   11/03/23 61   05/01/23 70   10/27/22 63       Physical Exam  Constitutional:       General: She is not in acute distress. Appearance: Normal appearance. HENT:      Mouth/Throat:      Mouth: Mucous membranes are moist.   Neck:      Vascular: No carotid bruit. Cardiovascular:      Rate and Rhythm: Normal rate and regular rhythm. Pulmonary:      Breath sounds: Normal breath sounds. No wheezing. Abdominal:      General: There is no distension. Palpations: Abdomen is soft. Musculoskeletal:         General: No swelling. Skin:     General: Skin is warm and dry. Neurological:      General: No focal deficit present. Psychiatric:         Mood and Affect: Mood normal.         Medical problems and test results were reviewed with the patient today. Lab Results   Component Value Date    WBC 6.9 12/10/2020    HGB 9.9 (L) 12/10/2020    HCT 29.7 (L) 12/10/2020    MCV 86.3 12/10/2020     12/10/2020       Lab Results   Component Value Date/Time     12/10/2020 03:19 AM    K 4.4 12/10/2020 03:19 AM     12/10/2020 03:19 AM    CO2 20 12/10/2020 03:19 AM    BUN 21 12/10/2020 03:19 AM    CREATININE 1.15 12/10/2020 03:19 AM    GLUCOSE 115 12/10/2020 03:19 AM    CALCIUM 9.0 12/10/2020 03:19 AM        Lab Results   Component Value Date    CHOL 233 (H) 04/25/2022     Lab Results   Component Value Date    TRIG 321 (H) 04/25/2022     Lab Results   Component Value Date    HDL 35 (L) 04/25/2022     Lab Results   Component Value Date    LDLCALC 133.8 (H) 04/25/2022     Lab Results   Component Value Date    LABVLDL 64.2 (H) 04/25/2022     Lab Results   Component Value Date    CHOLHDLRATIO 6.7 04/25/2022        Data from outside records/labs from outside providers have been reviewed and summarized as noted in the HPI, past history and data review sections of this note       ASSESSMENT and PLAN      1. Primary hypertension  BP well controlled. Continue Coreg and Losartan.       2.

## 2024-05-16 ENCOUNTER — OFFICE VISIT (OUTPATIENT)
Age: 79
End: 2024-05-16
Payer: MEDICARE

## 2024-05-16 ENCOUNTER — TELEPHONE (OUTPATIENT)
Age: 79
End: 2024-05-16

## 2024-05-16 VITALS
SYSTOLIC BLOOD PRESSURE: 158 MMHG | BODY MASS INDEX: 28.21 KG/M2 | DIASTOLIC BLOOD PRESSURE: 80 MMHG | HEIGHT: 63 IN | WEIGHT: 159.2 LBS | HEART RATE: 72 BPM

## 2024-05-16 DIAGNOSIS — E78.5 DYSLIPIDEMIA: ICD-10-CM

## 2024-05-16 DIAGNOSIS — I10 PRIMARY HYPERTENSION: Primary | ICD-10-CM

## 2024-05-16 DIAGNOSIS — I25.10 ATHEROSCLEROSIS OF NATIVE CORONARY ARTERY OF NATIVE HEART WITHOUT ANGINA PECTORIS: ICD-10-CM

## 2024-05-16 DIAGNOSIS — I45.10 RBBB: ICD-10-CM

## 2024-05-16 DIAGNOSIS — I35.0 NONRHEUMATIC AORTIC VALVE STENOSIS: ICD-10-CM

## 2024-05-16 PROBLEM — Z99.11 ENCOUNTER FOR WEANING FROM VENTILATOR (HCC): Status: RESOLVED | Noted: 2020-12-04 | Resolved: 2024-05-16

## 2024-05-16 PROCEDURE — G8399 PT W/DXA RESULTS DOCUMENT: HCPCS | Performed by: INTERNAL MEDICINE

## 2024-05-16 PROCEDURE — 99214 OFFICE O/P EST MOD 30 MIN: CPT | Performed by: INTERNAL MEDICINE

## 2024-05-16 PROCEDURE — 93000 ELECTROCARDIOGRAM COMPLETE: CPT | Performed by: INTERNAL MEDICINE

## 2024-05-16 PROCEDURE — G8417 CALC BMI ABV UP PARAM F/U: HCPCS | Performed by: INTERNAL MEDICINE

## 2024-05-16 PROCEDURE — 1123F ACP DISCUSS/DSCN MKR DOCD: CPT | Performed by: INTERNAL MEDICINE

## 2024-05-16 PROCEDURE — 1090F PRES/ABSN URINE INCON ASSESS: CPT | Performed by: INTERNAL MEDICINE

## 2024-05-16 PROCEDURE — 3077F SYST BP >= 140 MM HG: CPT | Performed by: INTERNAL MEDICINE

## 2024-05-16 PROCEDURE — 3079F DIAST BP 80-89 MM HG: CPT | Performed by: INTERNAL MEDICINE

## 2024-05-16 PROCEDURE — 1036F TOBACCO NON-USER: CPT | Performed by: INTERNAL MEDICINE

## 2024-05-16 PROCEDURE — G8428 CUR MEDS NOT DOCUMENT: HCPCS | Performed by: INTERNAL MEDICINE

## 2024-05-16 RX ORDER — ASCORBIC ACID 500 MG
500 TABLET ORAL DAILY
COMMUNITY

## 2024-05-16 ASSESSMENT — ENCOUNTER SYMPTOMS: SHORTNESS OF BREATH: 0

## 2024-05-16 NOTE — PROGRESS NOTES
[facility], [address], [address]  [place] [address]  PHONE: [phone]    Rhonda Poon  1945      SUBJECTIVE:   Rhonda Poon is a 79 y.o. female seen for a follow up visit regarding the following:     Chief Complaint   Patient presents with    Coronary Artery Disease    Hypertension       HPI:    Patient presents for follow-up.  She was last seen in November.  Multiple issues were addressed at today's visit.    Coronary artery disease: Prior bypass in 2020.  No recent anginal symptoms.    Aortic stenosis: Had recent echo   Echo (5/2/2024): EF 60-65%.  + DD.  Aortic stenosis.  MG 10.  PG 17.  Mild MR.  Moderate LAE.    Hypertension:  BP elevated in office but controlled at home 130/60-70.   Takes lasix 1/2 tablet a few times a week.     Hyperlipidemia: Unable to tolerate therapy.  Does not have a prescription drug plan.    Anemia: Recurrent anemia.  Requires iron transfusion.  Her last hemoglobin was 9.6 on April 24.      Past Medical History, Past Surgical History, Family history, Social History, and Medications were all reviewed with the patient today and updated as necessary.           Current Outpatient Medications:     vitamin C (ASCORBIC ACID) 500 MG tablet, Take 1 tablet by mouth daily, Disp: , Rfl:     carvedilol (COREG) 3.125 MG tablet, Take 1 tablet by mouth 2 times daily (with meals), Disp: 180 tablet, Rfl: 3    furosemide (LASIX) 40 MG tablet, Take 1 tablet by mouth daily as needed (edema.), Disp: 30 tablet, Rfl: 3    losartan (COZAAR) 50 MG tablet, Take 1 tablet by mouth daily, Disp: 90 tablet, Rfl: 3    nitroGLYCERIN (NITROSTAT) 0.4 MG SL tablet, Place 1 tablet under the tongue every 5 minutes as needed for Chest pain, Disp: 25 tablet, Rfl: 1    acetaminophen (TYLENOL) 325 MG tablet, Take 1 tablet by mouth as needed, Disp: , Rfl:     aspirin 81 MG EC tablet, Take 1 tablet by mouth, Disp: , Rfl:     esomeprazole (NEXIUM) 40 MG delayed release capsule, Take 20 mg by

## 2024-08-21 NOTE — PROGRESS NOTES
August 21, 2024       Naren Beasley MD  [address] [facility] [place] [address]  Via In Basket      Patient: Kathy Rodríguez   YOB: 1952   Date of Visit: 8/21/2024       Dear Dr. Beasley:    Thank you for referring Kathy Rodríguez to me for evaluation. Below are my notes for this visit with her.    If you have questions, please do not hesitate to call me. I look forward to following your patient along with you.      Sincerely,        Jayson Devine MD        CC: No Recipients  Jayson Devine MD  8/21/2024  1:46 PM  Signed    Patient is seen at the request of Jayson Chavarria MD    Subjective    Patient ID: Kathy is a 72 year old female.  Reason for Consult:    Chief Complaint   Patient presents with   • Office Visit   • Follow-up     Lv 05/21/2024         HPI:   ====  Kathy is a 72-year-old lady with history of CVA and HTN presenting for cardiology follow-up.     Patient was admitted to [facility] [place] on 06/26-06/28/24 due to chest pain. She presented with L sided chest pain with intermittent SOB. Pain is positional dependent and worse when lying flat. Pain is nontender to touch. Chest pain was resolved and noted to be pleuritic. Amlodipine was supposed to be discontinued due to swelling, however patient still takes it.     Patient complains of back pain when she's walking. She has to sit down so she does not fall.     She has some SOB with activity.     She denies any chest pain or palpitations. Denies any orthopnea, PND, or peripheral edema. Denies any dizziness, lightheadedness, or syncope. She denies any bleeding complications such as unusual bruising, epistaxis, hematuria, hematochezia, melena. She denies any muscle aches or pains. The patient has been tolerating their medication regimen.       Past Medical History:   Diagnosis Date   • Allergy    • Cerebral infarction  (CMD)    • Chest pain    • CVA (cerebral vascular accident)  (CMD) 08/25/2023   • Essential (primary) hypertension   Problem: Falls - Risk of  Goal: *Absence of Falls  Description: Document Ranjan Fowlertz Fall Risk and appropriate interventions in the flowsheet. Outcome: Progressing Towards Goal  Note: Fall Risk Interventions:  Mobility Interventions: Bed/chair exit alarm, Communicate number of staff needed for ambulation/transfer, Patient to call before getting OOB, PT Consult for mobility concerns, Strengthening exercises (ROM-active/passive), Utilize walker, cane, or other assistive device         Medication Interventions: Bed/chair exit alarm, Patient to call before getting OOB, Teach patient to arise slowly    Elimination Interventions: Bed/chair exit alarm, Call light in reach, Patient to call for help with toileting needs, Stay With Me (per policy)    History of Falls Interventions: Bed/chair exit alarm, Consult care management for discharge planning, Door open when patient unattended         Problem: Pressure Injury - Risk of  Goal: *Prevention of pressure injury  Description: Document Tyrell Scale and appropriate interventions in the flowsheet.   Outcome: Progressing Towards Goal  Note: Pressure Injury Interventions:  Sensory Interventions: Keep linens dry and wrinkle-free, Maintain/enhance activity level, Pressure redistribution bed/mattress (bed type)    Moisture Interventions: Absorbent underpads    Activity Interventions: Increase time out of bed, Pressure redistribution bed/mattress(bed type), PT/OT evaluation    Mobility Interventions: HOB 30 degrees or less, Pressure redistribution bed/mattress (bed type), PT/OT evaluation    Nutrition Interventions: Document food/fluid/supplement intake, Offer support with meals,snacks and hydration    Friction and Shear Interventions: Minimize layers   • GERD (gastroesophageal reflux disease)    • Goiter    • High cholesterol    • Hospital discharge follow-up 2023   • Light headedness    • OAB (overactive bladder)    • Obese    • Osteoarthritis    • Preop exam for internal medicine 06/10/2022   • Sciatica    • Severe left inguinal pain    • Sleep apnea    • SOB (shortness of breath)    • Use of cane as ambulatory aid     uses only at certain times- NOT all the time   • Vertigo    • Visit for screening mammogram 2019   • Wears glasses    • Wears partial dentures        ALLERGIES:   Allergen Reactions   • Lasix PRURITUS and THROAT SWELLING   • Clindamycin Other (See Comments)     \"doesn't remember reaction\"   • Penicillins PRURITUS and Other (See Comments)     Hair loss   • Sulfa Antibiotics HIVES and Other (See Comments)     burning        Past Surgical History:   Procedure Laterality Date   • Breast surgery      biopsy w/ chip placement    • Colonoscopy     • Esophagogastroduodenoscopy (egd)     • Extracapsular cataract removal w insert io lens prosth wo ecp Bilateral    • Eye surgery     • Foot surgery Bilateral     some bone removed from the toes of each foot   • Remv 2nd cataract,corn-scler sectn Bilateral    • Shoulder surgery Right    • Thyroidectomy, partial     • Tibial traction pin Right     right ankle/leg ORIF   • Total hip replacement Left    • Tubal ligation         Family History   Problem Relation Age of Onset   • Stroke Mother    • Tuberculosis Mother    • Patient is unaware of any medical problems Father    • Diabetes Sister    • Stroke Other    • Myocardial Infarction Other        Social History     Tobacco Use   • Smoking status: Former     Current packs/day: 0.00     Average packs/day: 2.0 packs/day for 13.0 years (26.0 ttl pk-yrs)     Types: Cigarettes     Start date:      Quit date:      Years since quittin.6   • Smokeless tobacco: Never   • Tobacco comments:     quit over 35 years ago   Vaping Use   • Vaping status:  never used   Substance Use Topics   • Alcohol use: Yes     Comment: on rare occasions   • Drug use: Never        Recent hospitalization:   Hospital Course 06/26-06/28/24:  72 year old female with PMHx of CVA, HTN, OLVIN, and HLD presents for evaluation of chest pain, shortness of breath. Patient reported L sided chest pain with intermittent shortness of breath. Pain is positional dependent and worse when lying flat. Pain is nontender to touch. Pt denies n/v/diaphoresis, palpitations. Admits to orthopnea and pnd as well as intermittent lower extremity swelling that resolves on own. Pt does state however that one moment over the past 2 weeks the swelling reached her thighs and abdomen. She describes her CP as a heavy sensation \"like something is sitting on my chest.\"      #Atypical CP (resolved)   - Appears pleuritic (positional, worse with coughing)   - Nonexertional  - Nontender to touch   - Pt does admit to orthopnea and pnd however so cardiac cannot be ruled out  - Trop neg x 2  - Probnp 1k  - TSH wnl   - EKG acutely nonischemic   - Pt does not appear hypervolemic at this time   - CP likely secondary to persistent cough from paralytic vocal cord  - S/p Toradol 15 mg q6h x 1 day   - F/u TTE   - Ethycranic acid with decent uop. As pt allergic to lasix, discuss with PCP whether or not to continue this medication as outpt   - Aldactone 12.5 mg qd   - PPI   - Telemetry while inpt  - Cardiology and ENT followed     #Hx of CVA  - Neuro exam at baseline   - ASA/Statin      #Vocal cord paralysis  #Persistent cough   - Gabapentin 300 mg nightly and 100 mg tid  - Symbicort 2 puff 2 times daily   - Robitussin prn   - ENT followed. F/u outpt      #HTN  - Dc amlodipine due to swelling episodes  - Ctm for swelling   - Continue Lopressor 50 mg bid      #OLVIN  - CPAP at night     #HLD   - Statin      #Microcytic anemia  - Hgb 11.6  - Iron panel wnl      #Elevated blood glucose  - Glucose 126 on admission   - Hold off on SSI       #Hypokalemia  - Replete prn              Review of Systems:   ================    Constitutional:  No chills, and no malaise  Eyes:  No change in eyesight, no pain  Ears, nose, mouth, throat, and face:  No pain  Respiratory:  No hemoptysis  Cardiovascular:  No palpitation  Gastrointestinal:  No melena  Genitourinary:  No hematuria  Musculoskeletal:  No muscle pain  Neurological:  No change in speech  Behvioral/Psych:  No change in mood      Objective  =========    Vitals:    08/21/24 1310   BP: 128/58   BP Location: RUE - Right upper extremity   Patient Position: Sitting   Cuff Size: Large Adult   Pulse: 71   Weight: 98.4 kg (217 lb 0.5 oz)   Height: 5' 4\" (1.626 m)                   Physical Exam:  =============      General: Alert, cooperative, no distress.  Head: No obvious abnormality  Eyes: Normal, no jaundice.  Throat: Lips, mucosa, moist and normal.  Neck: Supple.Carotid pulses: Left carotid bruit bruits ,enlarged thyroid.  Back: Symmetric.  Lungs: Clear, no wheezing, no rhonchi and no rales.  Heart:  Auscultation of heart: Bigeminal rhythm with a soft ejection systolic murmur.    Abdomen   No masses, tenderness or hepatosplenomegaly.    Examination of extremities  trace peripheral edema.    Musculoskeletal   Normal gait, normal muscle tone.    Neurologic   Oriented to person, place and time. Normal affect.  No apparent deficit        Lab:  ===      CBC:   Lab Results   Component Value Date    WBC 5.7 06/28/2024    WBC 5.1 11/03/2020    RBC 4.62 06/28/2024    RBC 5.46 (H) 11/03/2020     BMP:   Lab Results   Component Value Date    GLUCOSE 74 07/24/2024    GLUCOSE 94 11/03/2020    SODIUM 139 07/24/2024    SODIUM 143 11/03/2020    POTASSIUM 4.4 07/24/2024    POTASSIUM 4.0 11/03/2020    CHLORIDE 106 07/24/2024    CHLORIDE 107 11/03/2020    BUN 20 07/24/2024    BUN 15 11/03/2020    CREATININE 0.88 07/24/2024    CREATININE 0.92 11/03/2020    CALCIUM 9.2 07/24/2024    CALCIUM 9.1 11/03/2020     CMP:  Lab Results    Component Value Date    SODIUM 139 07/24/2024    SODIUM 143 11/03/2020    POTASSIUM 4.4 07/24/2024    POTASSIUM 4.0 11/03/2020    CHLORIDE 106 07/24/2024    CHLORIDE 107 11/03/2020    ANIONGAP 9 07/24/2024    ANIONGAP 12 11/03/2020    GLUCOSE 74 07/24/2024    GLUCOSE 94 11/03/2020    BUN 20 07/24/2024    BUN 15 11/03/2020    CREATININE 0.88 07/24/2024    CREATININE 0.92 11/03/2020    ALBUMIN 3.5 (L) 06/26/2024    ALBUMIN 3.9 11/03/2020    CALCIUM 9.2 07/24/2024    CALCIUM 9.1 11/03/2020    AST 34 06/26/2024    AST 14 11/03/2020    GFRNA 64 11/03/2020    GFRA 74 11/03/2020     Cardiac markers: No results found for: \"CPK\", \"CKMB\", \"TROPONINI\", \"MYOGLOBIN\"  BNP:   Lab Results   Component Value Date    BNP 46 12/14/2016       Magnesium level 2.3    Cardiac test :  ==========  Echocardiogram 12/16/2021  STUDY CONCLUSIONS  SUMMARY:     1. Left ventricle: The cavity size is normal. Wall thickness is normal.     The ejection fraction was measured by biplane method of disks. Doppler     parameters are consistent with abnormal left ventricular relaxation and     increased filling pressure (grade 2 diastolic dysfunction). The     ejection fraction is 51%.  2. Left atrium: The atrium is mildly dilated.  3. Right ventricle: Systolic function is normal. The RV pressure during     systole by Doppler is 39mm Hg.     Event monitor 30 days  Interpretation Summary    Mrs. Landeros is a 69-year-old lady presenting for the evaluation of palpitation PACs and PVCs and to exclude atrial fibrillation.  Quality of the recording is good  Event monitor duration 30 days 12/16/2021 until 1/15/2022.  Basic rhythm is sinus sinus rhythm rate ranging from 65 bpm and the highest 143 bpm.  Occasional premature ventricular complexes less than 1% of the total with couplets and short runs of ventricular tachycardia longest 8 beats her heart rate 192.  Rare premature supraventricular ectopic beats with short runs of PSVT no evidence of atrial  fibrillation.  No excessive pauses or blocks    Lexiscan Myoview was done on 2 /7  Summary:     1. Myocardial perfusion imaging: Left ventricular size is normal. There is     no transient ischemic dilation of the left ventricle during stress.     There is a small, mild, partially reversible defect involving the basal     and mid anterior wall(s), possibly due to attenuation from breast     tissue. The noncorrected images show a similar defect present in the     anterior walls on both rest and stress. WIth attenuation correction,     the anterior defect appears to worsen with the correction. I suspect     this is being generated because of significant radiotracer uptake noted     below the diaphragm. There is at baseline breast attenuation artifact.     Although i suspect again this a reversible defect most attributed to     breast attenuation and correction generated artifact, ischemia in the     LAD distribution cannot completely be excluded. Consider other tools to     assess for obstructive CAD like CTA or conventional angiography if     clinically warrented.        This is a borderline study.  2. Gated SPECT: The calculated left ventricular ejection fraction is 54%.     LV global systolic function is normal.  3. Stress ECG conclusions: The stress ECG is normal.  4. Baseline ECG: Normal sinus rhythm.  Impressions:  Left ventricular global systolic function is normal.  Atypical chest pain    Cardiac catheterization some irregularity of the RCA otherwise no obstructive disease    Echo 5/3/23  STUDY CONCLUSIONS  SUMMARY:     1. Left ventricle: The cavity size is at the upper limits of normal. Wall     thickness is increased. Systolic function is at the lower limits of normal.     The ejection fraction was measured by biplane method of disks. The ejection     fraction is 50%.  2. Right ventricle: The cavity size is normal. Wall thickness is normal.     Systolic function is normal.   holter monitore  10/23/2023    Diagnosis: TIA  Ordering physician: Dr. JJ Devine     Patient wore the monitor for total of 3 days.     Patient's baseline heart rhythm is sinus rhythm with an average heart rate of 65 bpm and range of heart rates 45 to 150 bpm.     There were occasional PACs and PVCs.     There were 17 runs of SVT lasting up to 12 beats in duration.     There were 3 runs of ventricular tachycardia lasting up to 6 beats in duration.     There were no significant pauses.     There were no symptoms reported.        Impression:  1.  Sinus rhythm with average heart rate 65 bpm and range of heart rates 45 to 150 bpm  2.  Occasional PACs and PVCs  3.  17 runs of SVT lasting up to 4 beats in duration  4.  Three runs of ventricular tachycardia lasting up to 6 beats in duration  5.  No significant pauses  6.  No symptoms reported   ------------------------------------------------------------------------------  STUDY CONCLUSIONS   10/23/2023   SUMMARY:     1. Left ventricle: The cavity size is normal. Wall thickness is mildly     increased. There is concentric hypertrophy. Systolic function is normal.     The ejection fraction was measured by biplane method of disks. The ejection     fraction is 65%.  2. Left atrium: The atrium is mildly to moderately dilated.  3. Right ventricle: The cavity size is normal. Systolic function is normal.  4. Bubble study negative       Radiology :  =========    Results for orders placed in visit on 06/03/20    XR ADVOCATE PROCEDURE    Impression  No acute fracture or dislocation.    -----  F I N A L  -----    Transcribed By: JADON  06/03/20 11:19 pm    Dictated By:            MARCO A, CARMEN MCDOWELL MD    Electronically Reviewed and Approved By:           MARCO A, CARMEN MCDOWELL MD  06/03/20 11:19 pm  MRI brain without contrast  IMPRESSION: 8/ 26/ 2023     Small acute infarctions involving the left angular gyrus and Wernicke's  area within the left middle cerebral artery territory.     CT head and  neck with contrast  IMPRESSION:     1. No acute intracranial hemorrhage.     2. No large arterial occlusions or significant stenoses identified in the  head or neck.     3. 2 mm outpouching from the left cavernous carotid noted. This may  represent a prominent infundibulum. A small vessel is seen arising from  this. Follow-up is advised.         The findings were communicated by telephone to Dr. ALEX CHAUDHRY by Dr. JONEL BANDA MD at 8/25/2023 2:29 PM.   swallow study no aspiration        Assessment:  ==============              DUONG (dyspnea on exertion)  (primary encounter diagnosis)  Benign essential hypertension      Plan:  ========    Recent hospital admission: Patient was admitted to [facility] on 06/26-06/28/24 due to chest pain. She presented with L sided chest pain with intermittent SOB. Pain is positional dependent and worse when lying flat. Pain is nontender to touch. Chest pain was resolved and noted to be pleuritic. Amlodipine was supposed to be discontinued due to swelling but she did not stop it and was following her WT .     DUONG: Patient has some SOB with activity. Will order dobutamine stress test.    Acute left middle cerebral artery territory infarction: Holter and ANGEL LUIS were ordered, could not have the ANGEL LUIS because of her thyroid not by Dr. Santos or Dr. Burnette  echocardiogram with bubbles was negative for shunt discussed with patient and daughter in detail.     Radiofrequency ablation of thyroid: Some hoarseness and no aspiration. Follow up with ENT.     Hypertension: Blood pressure is controlled, 128/58. Log reviewed few 140 every week Discontinue amlodipine. Continue spironolactone 25 mg qd and metoprolol 50 mg bid. Start losartan 50 mg qd.          Orders Placed This Encounter   • losartan (COZAAR) 50 MG tablet   • ECHOCARDIOGRAM STRESS TEST W/ W/O IMAGING AGENT             Current Outpatient Medications   Medication Sig Dispense Refill   • losartan (COZAAR) 50 MG tablet Take 1 tablet by mouth  daily. 30 tablet 1   • spironolactone (ALDACTONE) 25 MG tablet Take 1 tablet by mouth every Monday, Wednesday, and Friday. May take daily as needed for water weight gain of 2 lbs in a day or 5 lbs in a week 90 tablet 1   • gabapentin (NEURONTIN) 100 MG capsule Take 200mg in the morning, 200mg in the afternoon, 200mg in the evening, and 400mg at bedtime     • Calcium 600 MG tablet Take 2 tablets by mouth daily. 90 tablet 3   • acetaminophen (TYLENOL) 650 MG CR tablet Take 1 tablet by mouth every 8 hours as needed for Pain. 90 tablet 0   • lidocaine (LIDODERM) 5 % patch Place 1 patch onto the skin every 24 hours. Remove patch 12 hours after applying 30 patch 0   • atorvastatin (LIPITOR) 40 MG tablet Take 1 tablet by mouth nightly. 90 tablet 1   • aspirin (ECOTRIN) 81 MG EC tablet Take 1 tablet by mouth daily. Aspirin - Shared assessment risk and decision between treating physicians recommended. 30 tablet 0   • Multiple Vitamin (Multivitamin) Tab Take 1 tablet by mouth daily.     • albuterol 108 (90 Base) MCG/ACT inhaler Inhale 2 puffs into the lungs every 4 hours as needed for Shortness of Breath or Wheezing. 1 each 1   • mometasone-formoterol (DULERA) 100-5 MCG/ACT inhaler Inhale 2 puffs into the lungs in the morning and 2 puffs in the evening. 13 g 1   • omeprazole (PrilOSEC) 20 MG capsule TAKE 1 CAPSULE BY MOUTH DAILY. TAKE 30 MINUTES BEFORE BREAKFAST 90 capsule 1   • metoPROLOL tartrate (LOPRESSOR) 50 MG tablet Take 1 tablet by mouth in the morning and 1 tablet in the evening. 90 tablet 3   • meclizine (ANTIVERT) 12.5 MG tablet Take 1 tablet by mouth 3 times daily as needed for Dizziness. 90 tablet 1   • Cholecalciferol (Vitamin D) 125 MCG (5000 UT) Cap Take 125 mcg by mouth every other day. 30 capsule 3     No current facility-administered medications for this visit.      Follow up in 2 months.     On 08/21/24ANNELISE Aida Adriano scribed the services personally performed by Jayson Devine MD. The documentation  recorded by the scribe accurately and completely reflects the service(s) I personally performed and the decisions made by me.    Electronically signed by:  Jayson Devine MD, 8/21/2024

## 2024-11-07 ENCOUNTER — TELEPHONE (OUTPATIENT)
Age: 79
End: 2024-11-07

## 2024-11-07 RX ORDER — LOSARTAN POTASSIUM 50 MG/1
50 TABLET ORAL DAILY
Qty: 90 TABLET | Refills: 3 | Status: SHIPPED | OUTPATIENT
Start: 2024-11-07

## 2024-11-07 NOTE — TELEPHONE ENCOUNTER
Prescription sent to [facility]  Requested Prescriptions     Signed Prescriptions Disp Refills    losartan (COZAAR) 50 MG tablet 90 tablet 3     Sig: Take 1 tablet by mouth daily     Authorizing Provider: VINEET ZABALA     Ordering User: CHI AJ

## 2024-11-20 PROBLEM — R60.0 LOCALIZED EDEMA: Status: RESOLVED | Noted: 2020-12-18 | Resolved: 2024-11-20

## 2024-11-20 PROBLEM — Z95.1 S/P CABG X 3: Status: RESOLVED | Noted: 2020-12-04 | Resolved: 2024-11-20

## 2024-11-20 PROBLEM — I25.5 ISCHEMIC CARDIOMYOPATHY: Status: RESOLVED | Noted: 2020-12-17 | Resolved: 2024-11-20

## 2024-11-20 PROBLEM — R55 SYNCOPE AND COLLAPSE: Status: RESOLVED | Noted: 2020-11-29 | Resolved: 2024-11-20

## 2024-11-20 ASSESSMENT — ENCOUNTER SYMPTOMS: SHORTNESS OF BREATH: 0

## 2024-11-21 ENCOUNTER — OFFICE VISIT (OUTPATIENT)
Age: 79
End: 2024-11-21
Payer: MEDICARE

## 2024-11-21 VITALS
WEIGHT: 156 LBS | SYSTOLIC BLOOD PRESSURE: 110 MMHG | DIASTOLIC BLOOD PRESSURE: 62 MMHG | HEART RATE: 62 BPM | HEIGHT: 63 IN | BODY MASS INDEX: 27.64 KG/M2

## 2024-11-21 DIAGNOSIS — D50.9 IRON DEFICIENCY ANEMIA, UNSPECIFIED IRON DEFICIENCY ANEMIA TYPE: ICD-10-CM

## 2024-11-21 DIAGNOSIS — E78.5 DYSLIPIDEMIA: ICD-10-CM

## 2024-11-21 DIAGNOSIS — I35.0 NONRHEUMATIC AORTIC VALVE STENOSIS: ICD-10-CM

## 2024-11-21 DIAGNOSIS — I65.23 BILATERAL CAROTID ARTERY STENOSIS: Primary | ICD-10-CM

## 2024-11-21 DIAGNOSIS — I25.10 ATHEROSCLEROSIS OF NATIVE CORONARY ARTERY OF NATIVE HEART WITHOUT ANGINA PECTORIS: ICD-10-CM

## 2024-11-21 DIAGNOSIS — I45.10 RBBB: ICD-10-CM

## 2024-11-21 DIAGNOSIS — I10 PRIMARY HYPERTENSION: ICD-10-CM

## 2024-11-21 PROCEDURE — 1090F PRES/ABSN URINE INCON ASSESS: CPT | Performed by: INTERNAL MEDICINE

## 2024-11-21 PROCEDURE — G8484 FLU IMMUNIZE NO ADMIN: HCPCS | Performed by: INTERNAL MEDICINE

## 2024-11-21 PROCEDURE — 1159F MED LIST DOCD IN RCRD: CPT | Performed by: INTERNAL MEDICINE

## 2024-11-21 PROCEDURE — 1126F AMNT PAIN NOTED NONE PRSNT: CPT | Performed by: INTERNAL MEDICINE

## 2024-11-21 PROCEDURE — 99214 OFFICE O/P EST MOD 30 MIN: CPT | Performed by: INTERNAL MEDICINE

## 2024-11-21 PROCEDURE — 1036F TOBACCO NON-USER: CPT | Performed by: INTERNAL MEDICINE

## 2024-11-21 PROCEDURE — G8399 PT W/DXA RESULTS DOCUMENT: HCPCS | Performed by: INTERNAL MEDICINE

## 2024-11-21 PROCEDURE — 3078F DIAST BP <80 MM HG: CPT | Performed by: INTERNAL MEDICINE

## 2024-11-21 PROCEDURE — G8417 CALC BMI ABV UP PARAM F/U: HCPCS | Performed by: INTERNAL MEDICINE

## 2024-11-21 PROCEDURE — 3074F SYST BP LT 130 MM HG: CPT | Performed by: INTERNAL MEDICINE

## 2024-11-21 PROCEDURE — G8427 DOCREV CUR MEDS BY ELIG CLIN: HCPCS | Performed by: INTERNAL MEDICINE

## 2024-11-21 PROCEDURE — 1123F ACP DISCUSS/DSCN MKR DOCD: CPT | Performed by: INTERNAL MEDICINE

## 2024-11-21 RX ORDER — NITROGLYCERIN 0.4 MG/1
0.4 TABLET SUBLINGUAL EVERY 5 MIN PRN
Qty: 25 TABLET | Refills: 1 | Status: SHIPPED | OUTPATIENT
Start: 2024-11-21

## 2024-11-21 RX ORDER — CARVEDILOL 3.12 MG/1
3.12 TABLET ORAL 2 TIMES DAILY WITH MEALS
Qty: 180 TABLET | Refills: 3 | Status: SHIPPED | OUTPATIENT
Start: 2024-11-21

## 2024-11-21 RX ORDER — POTASSIUM CHLORIDE 750 MG/1
10 TABLET, EXTENDED RELEASE ORAL DAILY PRN
Qty: 90 TABLET | Refills: 3 | Status: SHIPPED | OUTPATIENT
Start: 2024-11-21

## 2024-11-21 RX ORDER — FUROSEMIDE 40 MG/1
40 TABLET ORAL DAILY PRN
Qty: 30 TABLET | Refills: 3 | Status: SHIPPED | OUTPATIENT
Start: 2024-11-21

## 2024-11-21 RX ORDER — LOSARTAN POTASSIUM 50 MG/1
50 TABLET ORAL DAILY
Qty: 90 TABLET | Refills: 3 | Status: SHIPPED | OUTPATIENT
Start: 2024-11-21

## 2024-11-21 NOTE — PROGRESS NOTES
[address], [address]  [place] [address]  PHONE: [phone]    Rhonda Poon  1945      SUBJECTIVE:   Rhonda Poon is a 79 y.o. female seen for a follow up visit regarding the following:     Chief Complaint   Patient presents with    Hypertension    Coronary Artery Disease       HPI:    Rhonda Poon presents for routine follow. Multiple issues addressed as outlined below:     Coronary Artery Disease  Status:  Patient denies any recent angina.  Notes compliance with medical therapy.  No recent NTG use.    Medications:  ASA 81 mg QD.  Prior History:     Admitted with syncope and NSTEMI (11/30/20)  2.  CABG (12/4/20):  LIMA to Diagonal, SVG to OM, SVG to PDA. Clipping of LUZ (no preop afib).      Aortic Stenosis  Status:  No exertional chest pain, limiting dyspnea or exertional syncope.  Last echo with mild aortic stenosis  Prior History:     Echo (11/6/20):  EF 60-65%.  +DD.  Mild LAE.  Aortic Stenosis:  MG 13.  PG 22.  DI 0.45.  Mild MR/TR.   2.  Echo (11/29/20):  EF 40-45%. Hypokinesis of inferolateral/apical walls. Mild AS.  MG 14. PG 24.  Mild TR.  3.  Echo (4/9/21):  EF 55-60%.  Mild bi-atrial enlargment.  Normal RV.  Mild AS.  MG 13. PG 21.  Mild MR/TR.    4.  Echo (4/19/2023): EF 55%.  Mild AS.  MG 11.  PG 20.  Mild TR  5.  Echo (5/2/2024): EF 60-65%.  + DD.  Aortic stenosis.  MG 10.  PG 17.  Mild MR.  Moderate LAE.    Carotid Artery Disease  Status:  No neurologic symptoms.  No TIA or CVA.    Medications: Aspirin 81 mg daily  Prior History:     1.   3/30/17 (Dr Parikh) Left carotid endarterectomy.  2.  5/15/17 (Dr. Parikh) Right carotid endarterectomy.  3.  Carotid duplex (4/17/2023): Mild (less than 50%) bilateral internal carotid artery stenosis.  Normal antegrade flow in vertebral arteries.  Elevated velocities consistent with bilateral external carotid artery stenosis.      Hypertension  Status:  Ambulatory BP readings have been controlled.  Patient

## 2025-05-12 ENCOUNTER — OFFICE VISIT (OUTPATIENT)
Age: 80
End: 2025-05-12
Payer: MEDICARE

## 2025-05-12 VITALS
SYSTOLIC BLOOD PRESSURE: 136 MMHG | BODY MASS INDEX: 27.36 KG/M2 | HEART RATE: 69 BPM | WEIGHT: 154.4 LBS | DIASTOLIC BLOOD PRESSURE: 78 MMHG | HEIGHT: 63 IN

## 2025-05-12 DIAGNOSIS — I25.10 ATHEROSCLEROSIS OF NATIVE CORONARY ARTERY OF NATIVE HEART WITHOUT ANGINA PECTORIS: ICD-10-CM

## 2025-05-12 DIAGNOSIS — I45.10 RBBB: ICD-10-CM

## 2025-05-12 DIAGNOSIS — I10 PRIMARY HYPERTENSION: Primary | ICD-10-CM

## 2025-05-12 DIAGNOSIS — E78.5 DYSLIPIDEMIA: ICD-10-CM

## 2025-05-12 DIAGNOSIS — I65.23 BILATERAL CAROTID ARTERY STENOSIS: ICD-10-CM

## 2025-05-12 DIAGNOSIS — I35.0 NONRHEUMATIC AORTIC VALVE STENOSIS: ICD-10-CM

## 2025-05-12 PROCEDURE — G8427 DOCREV CUR MEDS BY ELIG CLIN: HCPCS | Performed by: INTERNAL MEDICINE

## 2025-05-12 PROCEDURE — 99214 OFFICE O/P EST MOD 30 MIN: CPT | Performed by: INTERNAL MEDICINE

## 2025-05-12 PROCEDURE — 1126F AMNT PAIN NOTED NONE PRSNT: CPT | Performed by: INTERNAL MEDICINE

## 2025-05-12 PROCEDURE — 1090F PRES/ABSN URINE INCON ASSESS: CPT | Performed by: INTERNAL MEDICINE

## 2025-05-12 PROCEDURE — G8417 CALC BMI ABV UP PARAM F/U: HCPCS | Performed by: INTERNAL MEDICINE

## 2025-05-12 PROCEDURE — G8399 PT W/DXA RESULTS DOCUMENT: HCPCS | Performed by: INTERNAL MEDICINE

## 2025-05-12 PROCEDURE — 1159F MED LIST DOCD IN RCRD: CPT | Performed by: INTERNAL MEDICINE

## 2025-05-12 PROCEDURE — 3075F SYST BP GE 130 - 139MM HG: CPT | Performed by: INTERNAL MEDICINE

## 2025-05-12 PROCEDURE — 1123F ACP DISCUSS/DSCN MKR DOCD: CPT | Performed by: INTERNAL MEDICINE

## 2025-05-12 PROCEDURE — 3078F DIAST BP <80 MM HG: CPT | Performed by: INTERNAL MEDICINE

## 2025-05-12 PROCEDURE — 93000 ELECTROCARDIOGRAM COMPLETE: CPT | Performed by: INTERNAL MEDICINE

## 2025-05-12 PROCEDURE — 1036F TOBACCO NON-USER: CPT | Performed by: INTERNAL MEDICINE

## 2025-05-12 ASSESSMENT — ENCOUNTER SYMPTOMS: SHORTNESS OF BREATH: 0

## 2025-05-12 NOTE — PROGRESS NOTES
[address], [address]  [place] [address]  PHONE: [phone]    Rhonda Poon  1945      SUBJECTIVE:   Rhonda Poon is a 79 y.o. female seen for a follow up visit regarding the following:     Chief Complaint   Patient presents with    Coronary Artery Disease    Hypertension       HPI:    Rhonda Poon presents for routine follow. Multiple issues addressed as outlined below:     Coronary Artery Disease  Status:  Patient denies any recent angina.  Notes compliance with medical therapy.  No recent NTG use.    Medications:  ASA 81 mg QD.  Prior History:     Admitted with syncope and NSTEMI (11/30/20)  2.  CABG (12/4/20):  LIMA to Diagonal, SVG to OM, SVG to PDA. Clipping of LUZ (no preop afib).      Aortic Stenosis  Status:  No exertional chest pain, limiting dyspnea or exertional syncope.  Last echo with mild aortic stenosis  Prior History:     Echo (11/6/20):  EF 60-65%.  +DD.  Mild LAE.  Aortic Stenosis:  MG 13.  PG 22.  DI 0.45.  Mild MR/TR.   2.  Echo (11/29/20):  EF 40-45%. Hypokinesis of inferolateral/apical walls. Mild AS.  MG 14. PG 24.  Mild TR.  3.  Echo (4/9/21):  EF 55-60%.  Mild bi-atrial enlargment.  Normal RV.  Mild AS.  MG 13. PG 21.  Mild MR/TR.    4.  Echo (4/19/2023): EF 55%.  Mild AS.  MG 11.  PG 20.  Mild TR  5.  Echo (5/2/2024): EF 60-65%.  + DD.  Aortic stenosis.  MG 10.  PG 17.  Mild MR.  Moderate LAE.    Carotid Artery Disease  Status:  No neurologic symptoms.  Has been lost to vascular surgery follow-up and therefore we checked a recent duplex.  Recent duplex suggested significant worsening of her carotid disease compared to study in 2023  Carotid Duplex (5/1/24):      Moderate (50-69%) stenosis in the right internal carotid artery.    Moderate (50-79%) stenosis in the left internal carotid artery.    Normal antegrade flow involving the right vertebral artery.    Normal antegrade flow involving the left vertebral artery.  Medications: Aspirin 81

## 2025-05-13 DIAGNOSIS — E78.5 DYSLIPIDEMIA: ICD-10-CM

## 2025-05-13 LAB
CHOLEST SERPL-MCNC: 249 MG/DL (ref 0–200)
HDLC SERPL-MCNC: 38 MG/DL (ref 40–60)
HDLC SERPL: 6.6 (ref 0–5)
LDLC SERPL CALC-MCNC: 160 MG/DL (ref 0–100)
TRIGL SERPL-MCNC: 255 MG/DL (ref 0–150)
VLDLC SERPL CALC-MCNC: 51 MG/DL (ref 6–23)

## 2025-05-14 ENCOUNTER — RESULTS FOLLOW-UP (OUTPATIENT)
Dept: CARDIOLOGY | Age: 80
End: 2025-05-14

## 2025-05-14 DIAGNOSIS — I10 PRIMARY HYPERTENSION: ICD-10-CM

## 2025-05-14 RX ORDER — FUROSEMIDE 40 MG/1
40 TABLET ORAL DAILY PRN
Qty: 90 TABLET | Refills: 3 | Status: SHIPPED | OUTPATIENT
Start: 2025-05-14

## 2025-05-14 RX ORDER — CARVEDILOL 3.12 MG/1
3.12 TABLET ORAL 2 TIMES DAILY WITH MEALS
Qty: 180 TABLET | Refills: 3 | Status: SHIPPED | OUTPATIENT
Start: 2025-05-14

## 2025-05-14 RX ORDER — LOSARTAN POTASSIUM 50 MG/1
50 TABLET ORAL DAILY
Qty: 90 TABLET | Refills: 3 | Status: SHIPPED | OUTPATIENT
Start: 2025-05-14

## 2025-05-14 NOTE — TELEPHONE ENCOUNTER
Patient called with results, voiced understanding okay to do referral to infusion center. Patient thanked me. Also needed [facility]ills [facility]. Prescriptions escribed form filled ourt for Leqvio to [facility]  Requested Prescriptions     Signed Prescriptions Disp Refills    carvedilol (COREG) 3.125 MG tablet 180 tablet 3     Sig: Take 1 tablet by mouth 2 times daily (with meals)    furosemide (LASIX) 40 MG tablet 90 tablet 3     Sig: Take 1 tablet by mouth daily as needed (edema.)    losartan (COZAAR) 50 MG tablet 90 tablet 3     Sig: Take 1 tablet by mouth daily       ----- Message from Dr. Bud Mccord MD sent at 5/14/2025  1:44 PM EDT -----  Please call patient.  Her lipids remain poorly controlled.  She does not have a prescription drug plan.  Can we see if she is willing to consider inclisiran and whether this will be covered by her Medicare.  Thank you

## 2025-05-14 NOTE — RESULT ENCOUNTER NOTE
Please call patient.  Her lipids remain poorly controlled.  She does not have a prescription drug plan.  Can we see if she is willing to consider inclisiran and whether this will be covered by her Medicare.  Thank you

## 2025-06-03 ENCOUNTER — OFFICE VISIT (OUTPATIENT)
Dept: VASCULAR SURGERY | Age: 80
End: 2025-06-03
Payer: MEDICARE

## 2025-06-03 VITALS
BODY MASS INDEX: 27.46 KG/M2 | HEIGHT: 63 IN | HEART RATE: 70 BPM | SYSTOLIC BLOOD PRESSURE: 197 MMHG | DIASTOLIC BLOOD PRESSURE: 76 MMHG | OXYGEN SATURATION: 96 % | WEIGHT: 155 LBS

## 2025-06-03 DIAGNOSIS — I65.23 BILATERAL CAROTID ARTERY STENOSIS: Primary | ICD-10-CM

## 2025-06-03 PROCEDURE — 1090F PRES/ABSN URINE INCON ASSESS: CPT | Performed by: SURGERY

## 2025-06-03 PROCEDURE — G8399 PT W/DXA RESULTS DOCUMENT: HCPCS | Performed by: SURGERY

## 2025-06-03 PROCEDURE — G8417 CALC BMI ABV UP PARAM F/U: HCPCS | Performed by: SURGERY

## 2025-06-03 PROCEDURE — 1036F TOBACCO NON-USER: CPT | Performed by: SURGERY

## 2025-06-03 PROCEDURE — 1123F ACP DISCUSS/DSCN MKR DOCD: CPT | Performed by: SURGERY

## 2025-06-03 PROCEDURE — 3077F SYST BP >= 140 MM HG: CPT | Performed by: SURGERY

## 2025-06-03 PROCEDURE — 99213 OFFICE O/P EST LOW 20 MIN: CPT | Performed by: SURGERY

## 2025-06-03 PROCEDURE — G8427 DOCREV CUR MEDS BY ELIG CLIN: HCPCS | Performed by: SURGERY

## 2025-06-03 PROCEDURE — 3078F DIAST BP <80 MM HG: CPT | Performed by: SURGERY

## 2025-06-03 PROCEDURE — 1159F MED LIST DOCD IN RCRD: CPT | Performed by: SURGERY

## 2025-06-03 NOTE — PROGRESS NOTES
[address] [address]  [phone] FAX: [phone]    Rhonda Poon  : 1945    Chief Complaint:     History of Present Illness:   Patient follows up today for follow-up duplex study.  Patient status post bilateral carotid endarterectomy done back in 2017.  She denies any strokelike symptoms.    CURRENT MEDICATIONS:  Current Outpatient Medications   Medication Sig Dispense Refill    Inclisiran Sodium (LEQVIO SC) Inject into the skin      carvedilol (COREG) 3.125 MG tablet Take 1 tablet by mouth 2 times daily (with meals) 180 tablet 3    furosemide (LASIX) 40 MG tablet Take 1 tablet by mouth daily as needed (edema.) 90 tablet 3    losartan (COZAAR) 50 MG tablet Take 1 tablet by mouth daily 90 tablet 3    nitroGLYCERIN (NITROSTAT) 0.4 MG SL tablet Place 1 tablet under the tongue every 5 minutes as needed for Chest pain 25 tablet 1    potassium chloride (KLOR-CON M) 10 MEQ extended release tablet Take 1 tablet by mouth daily as needed (with lasix) 90 tablet 3    vitamin C (ASCORBIC ACID) 500 MG tablet Take 1 tablet by mouth daily      acetaminophen (TYLENOL) 325 MG tablet Take 1 tablet by mouth as needed      aspirin 81 MG EC tablet Take 1 tablet by mouth      esomeprazole (NEXIUM) 40 MG delayed release capsule Take 20 mg by mouth      vitamin E 400 UNIT capsule Take 1 capsule by mouth       No current facility-administered medications for this visit.       Past Medical History:   Diagnosis Date    Anemia     Hernandez's esophagus     Coronary atherosclerosis of native coronary vessel     no stents, no CABG, increased calcium score    GERD (gastroesophageal reflux disease)     daily meds    HLD (hyperlipidemia)     Hypercholesterolemia     Hypertension     Mild aortic stenosis     echo: 3/24/17  MACO 1.7 cm2, peak velocity 2.2 m/s, mean pressure gradient 10 mmHg, peak pressure gradient 212 mm Hg.     Occlusion and stenosis of carotid artery without mention of cerebral

## 2025-06-10 ENCOUNTER — HOSPITAL ENCOUNTER (OUTPATIENT)
Dept: CT IMAGING | Age: 80
Discharge: HOME | End: 2025-06-13
Attending: SURGERY
Payer: MEDICARE

## 2025-06-10 DIAGNOSIS — I65.23 BILATERAL CAROTID ARTERY STENOSIS: ICD-10-CM

## 2025-06-10 LAB — CREAT BLD-MCNC: 1.08 MG/DL (ref 0.8–1.5)

## 2025-06-10 PROCEDURE — 70498 CT ANGIOGRAPHY NECK: CPT

## 2025-06-10 PROCEDURE — 70498 CT ANGIOGRAPHY NECK: CPT | Performed by: RADIOLOGY

## 2025-06-10 PROCEDURE — 82565 ASSAY OF CREATININE: CPT

## 2025-06-10 PROCEDURE — 6360000004 HC RX CONTRAST MEDICATION: Performed by: SURGERY

## 2025-06-10 RX ORDER — IOPAMIDOL 755 MG/ML
60 INJECTION, SOLUTION INTRAVASCULAR
Status: COMPLETED | OUTPATIENT
Start: 2025-06-10 | End: 2025-06-10

## 2025-06-10 RX ADMIN — IOPAMIDOL 60 ML: 755 INJECTION, SOLUTION INTRAVENOUS at 14:14

## 2025-06-16 ENCOUNTER — HOSPITAL ENCOUNTER (INPATIENT)
Age: 80
LOS: 1 days | Discharge: HOME | DRG: 065 | End: 2025-06-19
Attending: EMERGENCY MEDICINE | Admitting: FAMILY MEDICINE
Payer: MEDICARE

## 2025-06-16 ENCOUNTER — APPOINTMENT (OUTPATIENT)
Dept: CT IMAGING | Age: 80
DRG: 065 | End: 2025-06-16
Payer: MEDICARE

## 2025-06-16 DIAGNOSIS — G45.3 AMAUROSIS FUGAX OF LEFT EYE: Primary | ICD-10-CM

## 2025-06-16 DIAGNOSIS — I10 ESSENTIAL HYPERTENSION: ICD-10-CM

## 2025-06-16 PROBLEM — I65.22 CAROTID STENOSIS, ASYMPTOMATIC, LEFT: Status: ACTIVE | Noted: 2017-03-30

## 2025-06-16 LAB
ALBUMIN SERPL-MCNC: 3.9 G/DL (ref 3.2–4.6)
ALBUMIN/GLOB SERPL: 1 (ref 1–1.9)
ALP SERPL-CCNC: 146 U/L (ref 35–104)
ALT SERPL-CCNC: 26 U/L (ref 8–45)
ANION GAP SERPL CALC-SCNC: 11 MMOL/L (ref 7–16)
AST SERPL-CCNC: 24 U/L (ref 15–37)
BASOPHILS # BLD: 0.06 K/UL (ref 0–0.2)
BASOPHILS NFR BLD: 1 % (ref 0–2)
BILIRUB SERPL-MCNC: 0.3 MG/DL (ref 0–1.2)
BUN SERPL-MCNC: 27 MG/DL (ref 8–23)
CALCIUM SERPL-MCNC: 10.5 MG/DL (ref 8.8–10.2)
CHLORIDE SERPL-SCNC: 104 MMOL/L (ref 98–107)
CO2 SERPL-SCNC: 22 MMOL/L (ref 20–29)
CREAT SERPL-MCNC: 1.28 MG/DL (ref 0.6–1.1)
DIFFERENTIAL METHOD BLD: ABNORMAL
EOSINOPHIL # BLD: 0.28 K/UL (ref 0–0.8)
EOSINOPHIL NFR BLD: 4.5 % (ref 0.5–7.8)
ERYTHROCYTE [DISTWIDTH] IN BLOOD BY AUTOMATED COUNT: 13.6 % (ref 11.9–14.6)
GLOBULIN SER CALC-MCNC: 4.1 G/DL (ref 2.3–3.5)
GLUCOSE SERPL-MCNC: 139 MG/DL (ref 70–99)
HCT VFR BLD AUTO: 35.5 % (ref 35.8–46.3)
HGB BLD-MCNC: 11.5 G/DL (ref 11.7–15.4)
IMM GRANULOCYTES # BLD AUTO: 0.03 K/UL (ref 0–0.5)
IMM GRANULOCYTES NFR BLD AUTO: 0.5 % (ref 0–5)
INR PPP: 1
LYMPHOCYTES # BLD: 1.53 K/UL (ref 0.5–4.6)
LYMPHOCYTES NFR BLD: 24.6 % (ref 13–44)
MCH RBC QN AUTO: 29.3 PG (ref 26.1–32.9)
MCHC RBC AUTO-ENTMCNC: 32.4 G/DL (ref 31.4–35)
MCV RBC AUTO: 90.6 FL (ref 82–102)
MONOCYTES # BLD: 0.6 K/UL (ref 0.1–1.3)
MONOCYTES NFR BLD: 9.6 % (ref 4–12)
NEUTS SEG # BLD: 3.73 K/UL (ref 1.7–8.2)
NEUTS SEG NFR BLD: 59.8 % (ref 43–78)
NRBC # BLD: 0 K/UL (ref 0–0.2)
PLATELET # BLD AUTO: 221 K/UL (ref 150–450)
PMV BLD AUTO: 9.5 FL (ref 9.4–12.3)
POTASSIUM SERPL-SCNC: 4.8 MMOL/L (ref 3.5–5.1)
PROT SERPL-MCNC: 7.9 G/DL (ref 6.3–8.2)
PROTHROMBIN TIME: 13.2 SEC (ref 11.3–14.9)
RBC # BLD AUTO: 3.92 M/UL (ref 4.05–5.2)
SODIUM SERPL-SCNC: 137 MMOL/L (ref 136–145)
WBC # BLD AUTO: 6.2 K/UL (ref 4.3–11.1)

## 2025-06-16 PROCEDURE — 70496 CT ANGIOGRAPHY HEAD: CPT

## 2025-06-16 PROCEDURE — 99285 EMERGENCY DEPT VISIT HI MDM: CPT

## 2025-06-16 PROCEDURE — 94760 N-INVAS EAR/PLS OXIMETRY 1: CPT

## 2025-06-16 PROCEDURE — G0378 HOSPITAL OBSERVATION PER HR: HCPCS

## 2025-06-16 PROCEDURE — 85025 COMPLETE CBC W/AUTO DIFF WBC: CPT

## 2025-06-16 PROCEDURE — 80053 COMPREHEN METABOLIC PANEL: CPT

## 2025-06-16 PROCEDURE — 70450 CT HEAD/BRAIN W/O DYE: CPT

## 2025-06-16 PROCEDURE — 93005 ELECTROCARDIOGRAM TRACING: CPT | Performed by: EMERGENCY MEDICINE

## 2025-06-16 PROCEDURE — 85610 PROTHROMBIN TIME: CPT

## 2025-06-16 PROCEDURE — 96374 THER/PROPH/DIAG INJ IV PUSH: CPT

## 2025-06-16 PROCEDURE — 6360000004 HC RX CONTRAST MEDICATION: Performed by: EMERGENCY MEDICINE

## 2025-06-16 PROCEDURE — 6360000002 HC RX W HCPCS: Performed by: EMERGENCY MEDICINE

## 2025-06-16 RX ORDER — VITAMIN E 268 MG
400 CAPSULE ORAL DAILY
Status: DISCONTINUED | OUTPATIENT
Start: 2025-06-17 | End: 2025-06-17 | Stop reason: RX

## 2025-06-16 RX ORDER — SODIUM CHLORIDE 9 MG/ML
INJECTION, SOLUTION INTRAVENOUS PRN
Status: DISCONTINUED | OUTPATIENT
Start: 2025-06-16 | End: 2025-06-19 | Stop reason: HOSPADM

## 2025-06-16 RX ORDER — SODIUM CHLORIDE 0.9 % (FLUSH) 0.9 %
5-40 SYRINGE (ML) INJECTION PRN
Status: DISCONTINUED | OUTPATIENT
Start: 2025-06-16 | End: 2025-06-19 | Stop reason: HOSPADM

## 2025-06-16 RX ORDER — ASPIRIN 300 MG/1
300 SUPPOSITORY RECTAL DAILY
Status: DISCONTINUED | OUTPATIENT
Start: 2025-06-17 | End: 2025-06-17

## 2025-06-16 RX ORDER — ASPIRIN 81 MG/1
81 TABLET ORAL DAILY
Status: DISCONTINUED | OUTPATIENT
Start: 2025-06-17 | End: 2025-06-17 | Stop reason: SDUPTHER

## 2025-06-16 RX ORDER — PANTOPRAZOLE SODIUM 40 MG/1
40 TABLET, DELAYED RELEASE ORAL
Status: DISCONTINUED | OUTPATIENT
Start: 2025-06-17 | End: 2025-06-19 | Stop reason: HOSPADM

## 2025-06-16 RX ORDER — FUROSEMIDE 40 MG/1
40 TABLET ORAL DAILY PRN
Status: DISCONTINUED | OUTPATIENT
Start: 2025-06-16 | End: 2025-06-19 | Stop reason: HOSPADM

## 2025-06-16 RX ORDER — CARVEDILOL 3.12 MG/1
3.12 TABLET ORAL 2 TIMES DAILY WITH MEALS
Status: DISCONTINUED | OUTPATIENT
Start: 2025-06-17 | End: 2025-06-19 | Stop reason: HOSPADM

## 2025-06-16 RX ORDER — ONDANSETRON 4 MG/1
4 TABLET, ORALLY DISINTEGRATING ORAL EVERY 8 HOURS PRN
Status: DISCONTINUED | OUTPATIENT
Start: 2025-06-16 | End: 2025-06-19 | Stop reason: HOSPADM

## 2025-06-16 RX ORDER — POLYETHYLENE GLYCOL 3350 17 G/17G
17 POWDER, FOR SOLUTION ORAL DAILY PRN
Status: DISCONTINUED | OUTPATIENT
Start: 2025-06-16 | End: 2025-06-19 | Stop reason: HOSPADM

## 2025-06-16 RX ORDER — POTASSIUM CHLORIDE 750 MG/1
10 TABLET, EXTENDED RELEASE ORAL DAILY PRN
Status: DISCONTINUED | OUTPATIENT
Start: 2025-06-16 | End: 2025-06-19 | Stop reason: HOSPADM

## 2025-06-16 RX ORDER — ONDANSETRON 2 MG/ML
4 INJECTION INTRAMUSCULAR; INTRAVENOUS EVERY 6 HOURS PRN
Status: DISCONTINUED | OUTPATIENT
Start: 2025-06-16 | End: 2025-06-19 | Stop reason: HOSPADM

## 2025-06-16 RX ORDER — LOSARTAN POTASSIUM 50 MG/1
50 TABLET ORAL DAILY
Status: DISCONTINUED | OUTPATIENT
Start: 2025-06-17 | End: 2025-06-19 | Stop reason: HOSPADM

## 2025-06-16 RX ORDER — IOPAMIDOL 755 MG/ML
100 INJECTION, SOLUTION INTRAVASCULAR
Status: COMPLETED | OUTPATIENT
Start: 2025-06-16 | End: 2025-06-16

## 2025-06-16 RX ORDER — ENOXAPARIN SODIUM 100 MG/ML
40 INJECTION SUBCUTANEOUS DAILY
Status: DISCONTINUED | OUTPATIENT
Start: 2025-06-17 | End: 2025-06-18

## 2025-06-16 RX ORDER — SODIUM CHLORIDE 0.9 % (FLUSH) 0.9 %
5-40 SYRINGE (ML) INJECTION EVERY 12 HOURS SCHEDULED
Status: DISCONTINUED | OUTPATIENT
Start: 2025-06-16 | End: 2025-06-19 | Stop reason: HOSPADM

## 2025-06-16 RX ORDER — HYDRALAZINE HYDROCHLORIDE 20 MG/ML
10 INJECTION INTRAMUSCULAR; INTRAVENOUS ONCE
Status: COMPLETED | OUTPATIENT
Start: 2025-06-16 | End: 2025-06-16

## 2025-06-16 RX ORDER — ATORVASTATIN CALCIUM 40 MG/1
40 TABLET, FILM COATED ORAL NIGHTLY
Status: DISCONTINUED | OUTPATIENT
Start: 2025-06-16 | End: 2025-06-19 | Stop reason: HOSPADM

## 2025-06-16 RX ORDER — ASCORBIC ACID 500 MG
500 TABLET ORAL DAILY
Status: DISCONTINUED | OUTPATIENT
Start: 2025-06-17 | End: 2025-06-19 | Stop reason: HOSPADM

## 2025-06-16 RX ORDER — ASPIRIN 81 MG/1
81 TABLET, CHEWABLE ORAL DAILY
Status: DISCONTINUED | OUTPATIENT
Start: 2025-06-17 | End: 2025-06-19 | Stop reason: HOSPADM

## 2025-06-16 RX ADMIN — IOPAMIDOL 100 ML: 755 INJECTION, SOLUTION INTRAVENOUS at 20:42

## 2025-06-16 RX ADMIN — HYDRALAZINE HYDROCHLORIDE 10 MG: 20 INJECTION, SOLUTION INTRAMUSCULAR; INTRAVENOUS at 19:31

## 2025-06-16 ASSESSMENT — ENCOUNTER SYMPTOMS
VISION LOSS: 1
ABDOMINAL PAIN: 0
EYE PAIN: 0
CONSTIPATION: 0
SHORTNESS OF BREATH: 0
BACK PAIN: 0
VOMITING: 0
DIARRHEA: 0
COUGH: 0
NAUSEA: 0
SORE THROAT: 0
COLOR CHANGE: 0
RHINORRHEA: 0

## 2025-06-16 ASSESSMENT — LIFESTYLE VARIABLES
HOW OFTEN DO YOU HAVE A DRINK CONTAINING ALCOHOL: NEVER
HOW MANY STANDARD DRINKS CONTAINING ALCOHOL DO YOU HAVE ON A TYPICAL DAY: PATIENT DOES NOT DRINK

## 2025-06-16 ASSESSMENT — PAIN SCALES - GENERAL: PAINLEVEL_OUTOF10: 0

## 2025-06-16 ASSESSMENT — PAIN - FUNCTIONAL ASSESSMENT: PAIN_FUNCTIONAL_ASSESSMENT: 0-10

## 2025-06-16 NOTE — ED PROVIDER NOTES
Emergency Department Provider Note       [facility] EMERGENCY DEPT   PCP: Sveta Hawthorne DO   Age: 80 y.o.   Sex: female     DISPOSITION Decision To Admit 06/16/2025 10:03:00 PM    ICD-10-CM    1. Amaurosis fugax of left eye  G45.3           Medical Decision Making     Patient had sudden vision loss in the left eye.  Lasted 10 to 12 minutes.  She had a mild left-sided headache.  Has known carotid artery disease.  No acute on CT head.  CTA shows 75% stenosis left common carotid artery.  Will admit for further treatment and evaluation.     1 or more acute illnesses that pose a threat to life or bodily function.   Prescription drug management performed.  Drug therapy given requiring intensive monitoring for toxicity.  Shared medical decision making was utilized in creating the patients health plan today.  I independently ordered and reviewed each unique test.    I reviewed external records: provider visit note from outside specialist.   The patients assessment required an independent historian: Family.  The reason they were needed is important historical information not provided by the patient.  ED cardiac monitoring rhythm strip was ordered and interpreted:  sinus rhythm, no evidence of an arrhythmia  ST Segments:Nonspecific ST segments - NO STEMI   Rate: 70  I interpreted the CT Scan no hemorrhage.  ED provider's independent EKG interpretation normal sinus rhythm rate 68.  Nonspecific ST changes.  The patient was admitted and I have discussed patient management with the admitting provider.          History     Patient had an episode of sudden vision loss in her left eye occurring at 1800.  Lasted 10 to 12 minutes.  Slowly regained her vision and now has complete intact vision in the left eye.  Mild left headache.  No slurred speech or facial droop.  No numbness or weakness in her arms or legs.  No gait imbalance.  Came here for evaluation.    The history is provided by the patient. No  was used.  g/dL    Hematocrit 35.5 (L) 35.8 - 46.3 %    MCV 90.6 82.0 - 102.0 FL    MCH 29.3 26.1 - 32.9 PG    MCHC 32.4 31.4 - 35.0 g/dL    RDW 13.6 11.9 - 14.6 %    Platelets 221 150 - 450 K/uL    MPV 9.5 9.4 - 12.3 FL    nRBC 0.00 0.0 - 0.2 K/uL    Differential Type AUTOMATED      Neutrophils % 59.8 43.0 - 78.0 %    Lymphocytes % 24.6 13.0 - 44.0 %    Monocytes % 9.6 4.0 - 12.0 %    Eosinophils % 4.5 0.5 - 7.8 %    Basophils % 1.0 0.0 - 2.0 %    Immature Granulocytes % 0.5 0.0 - 5.0 %    Neutrophils Absolute 3.73 1.70 - 8.20 K/UL    Lymphocytes Absolute 1.53 0.50 - 4.60 K/UL    Monocytes Absolute 0.60 0.10 - 1.30 K/UL    Eosinophils Absolute 0.28 0.00 - 0.80 K/UL    Basophils Absolute 0.06 0.00 - 0.20 K/UL    Immature Granulocytes Absolute 0.03 0.0 - 0.5 K/UL   Comprehensive Metabolic Panel   Result Value Ref Range    Sodium 137 136 - 145 mmol/L    Potassium 4.8 3.5 - 5.1 mmol/L    Chloride 104 98 - 107 mmol/L    CO2 22 20 - 29 mmol/L    Anion Gap 11 7 - 16 mmol/L    Glucose 139 (H) 70 - 99 mg/dL    BUN 27 (H) 8 - 23 MG/DL    Creatinine 1.28 (H) 0.60 - 1.10 MG/DL    Est, Glom Filt Rate 42 (L) >60 ml/min/1.73m2    Calcium 10.5 (H) 8.8 - 10.2 MG/DL    Total Bilirubin 0.3 0.0 - 1.2 MG/DL    ALT 26 8 - 45 U/L    AST 24 15 - 37 U/L    Alk Phosphatase 146 (H) 35 - 104 U/L    Total Protein 7.9 6.3 - 8.2 g/dL    Albumin 3.9 3.2 - 4.6 g/dL    Globulin 4.1 (H) 2.3 - 3.5 g/dL    Albumin/Globulin Ratio 1.0 1.0 - 1.9     Protime-INR   Result Value Ref Range    Protime 13.2 11.3 - 14.9 sec    INR 1.0     EKG 12 Lead   Result Value Ref Range    Ventricular Rate 68 BPM    Atrial Rate 68 BPM    P-R Interval 201 ms    QRS Duration 138 ms    Q-T Interval 387 ms    QTc Calculation (Bazett) 412 ms    P Axis 97 degrees    R Axis 84 degrees    T Axis 13 degrees    Diagnosis Sinus rhythm  Right bundle branch block            CT HEAD WO CONTRAST   Final Result   No acute intracranial process.      Electronically signed by Paul Kwan      CTA HEAD

## 2025-06-16 NOTE — ED TRIAGE NOTES
Patient to triage with c/o losing vision in her left eye stating it went completely black and came back like a curtain lifting up litte by little.

## 2025-06-17 ENCOUNTER — APPOINTMENT (OUTPATIENT)
Dept: NON INVASIVE DIAGNOSTICS | Age: 80
DRG: 065 | End: 2025-06-17
Attending: FAMILY MEDICINE
Payer: MEDICARE

## 2025-06-17 ENCOUNTER — APPOINTMENT (OUTPATIENT)
Dept: MRI IMAGING | Age: 80
DRG: 065 | End: 2025-06-17
Payer: MEDICARE

## 2025-06-17 LAB
ANION GAP SERPL CALC-SCNC: 14 MMOL/L (ref 7–16)
BUN SERPL-MCNC: 23 MG/DL (ref 8–23)
CALCIUM SERPL-MCNC: 10.5 MG/DL (ref 8.8–10.2)
CHLORIDE SERPL-SCNC: 104 MMOL/L (ref 98–107)
CHOLEST SERPL-MCNC: 157 MG/DL (ref 0–200)
CO2 SERPL-SCNC: 19 MMOL/L (ref 20–29)
CREAT SERPL-MCNC: 1.18 MG/DL (ref 0.6–1.1)
ECHO AO ASC DIAM: 2.6 CM
ECHO AO ASCENDING AORTA INDEX: 1.5 CM/M2
ECHO AO ROOT DIAM: 2.3 CM
ECHO AO ROOT INDEX: 1.33 CM/M2
ECHO AV AREA PEAK VELOCITY: 0.9 CM2
ECHO AV AREA VTI: 1 CM2
ECHO AV AREA/BSA PEAK VELOCITY: 0.5 CM2/M2
ECHO AV AREA/BSA VTI: 0.6 CM2/M2
ECHO AV MEAN GRADIENT: 14 MMHG
ECHO AV MEAN VELOCITY: 1.8 M/S
ECHO AV PEAK GRADIENT: 23 MMHG
ECHO AV PEAK VELOCITY: 2.4 M/S
ECHO AV VELOCITY RATIO: 0.33
ECHO AV VTI: 60.2 CM
ECHO BSA: 1.76 M2
ECHO EST RA PRESSURE: 3 MMHG
ECHO IVC EXP: 1.2 CM
ECHO LA AREA 2C: 16.3 CM2
ECHO LA AREA 4C: 19.8 CM2
ECHO LA DIAMETER INDEX: 2.31 CM/M2
ECHO LA DIAMETER: 4 CM
ECHO LA MAJOR AXIS: 5.7 CM
ECHO LA MINOR AXIS: 5.2 CM
ECHO LA TO AORTIC ROOT RATIO: 1.74
ECHO LA VOL BP: 52 ML (ref 22–52)
ECHO LA VOL MOD A2C: 44 ML (ref 22–52)
ECHO LA VOL MOD A4C: 57 ML (ref 22–52)
ECHO LA VOL/BSA BIPLANE: 30 ML/M2 (ref 16–34)
ECHO LA VOLUME INDEX MOD A2C: 25 ML/M2 (ref 16–34)
ECHO LA VOLUME INDEX MOD A4C: 33 ML/M2 (ref 16–34)
ECHO LV E' LATERAL VELOCITY: 6.48 CM/S
ECHO LV E' SEPTAL VELOCITY: 4.25 CM/S
ECHO LV EDV A2C: 72 ML
ECHO LV EDV A4C: 65 ML
ECHO LV EDV INDEX A4C: 38 ML/M2
ECHO LV EDV NDEX A2C: 42 ML/M2
ECHO LV EF PHYSICIAN: 70 %
ECHO LV EJECTION FRACTION A2C: 70 %
ECHO LV EJECTION FRACTION A4C: 71 %
ECHO LV EJECTION FRACTION BIPLANE: 70 % (ref 55–100)
ECHO LV ESV A2C: 22 ML
ECHO LV ESV A4C: 19 ML
ECHO LV ESV INDEX A2C: 13 ML/M2
ECHO LV ESV INDEX A4C: 11 ML/M2
ECHO LV FRACTIONAL SHORTENING: 34 % (ref 28–44)
ECHO LV INTERNAL DIMENSION DIASTOLE INDEX: 2.02 CM/M2
ECHO LV INTERNAL DIMENSION DIASTOLIC: 3.5 CM (ref 3.9–5.3)
ECHO LV INTERNAL DIMENSION SYSTOLIC INDEX: 1.33 CM/M2
ECHO LV INTERNAL DIMENSION SYSTOLIC: 2.3 CM
ECHO LV IVSD: 1.1 CM (ref 0.6–0.9)
ECHO LV MASS 2D: 127.3 G (ref 67–162)
ECHO LV MASS INDEX 2D: 73.6 G/M2 (ref 43–95)
ECHO LV POSTERIOR WALL DIASTOLIC: 1.2 CM (ref 0.6–0.9)
ECHO LV RELATIVE WALL THICKNESS RATIO: 0.69
ECHO LVOT AREA: 2.8 CM2
ECHO LVOT AV VTI INDEX: 0.36
ECHO LVOT DIAM: 1.9 CM
ECHO LVOT MEAN GRADIENT: 2 MMHG
ECHO LVOT PEAK GRADIENT: 2 MMHG
ECHO LVOT PEAK VELOCITY: 0.8 M/S
ECHO LVOT STROKE VOLUME INDEX: 35.5 ML/M2
ECHO LVOT SV: 61.5 ML
ECHO LVOT VTI: 21.7 CM
ECHO MV A VELOCITY: 0.82 M/S
ECHO MV AREA VTI: 2.1 CM2
ECHO MV E DECELERATION TIME (DT): 173 MS
ECHO MV E VELOCITY: 1.25 M/S
ECHO MV E/A RATIO: 1.52
ECHO MV E/E' LATERAL: 19.29
ECHO MV E/E' RATIO (AVERAGED): 24.35
ECHO MV E/E' SEPTAL: 29.41
ECHO MV LVOT VTI INDEX: 1.33
ECHO MV MAX VELOCITY: 1.3 M/S
ECHO MV MEAN GRADIENT: 2 MMHG
ECHO MV MEAN VELOCITY: 0.7 M/S
ECHO MV PEAK GRADIENT: 7 MMHG
ECHO MV VTI: 28.8 CM
ECHO PV ACCELERATION TIME (AT): 99 MS
ECHO PV MAX VELOCITY: 1.1 M/S
ECHO PV PEAK GRADIENT: 4 MMHG
ECHO RIGHT VENTRICULAR SYSTOLIC PRESSURE (RVSP): 28 MMHG
ECHO RV BASAL DIMENSION: 3.3 CM
ECHO RV FREE WALL PEAK S': 6.7 CM/S
ECHO RVOT MEAN GRADIENT: 2 MMHG
ECHO RVOT PEAK GRADIENT: 4 MMHG
ECHO RVOT PEAK VELOCITY: 1 M/S
ECHO RVOT VTI: 17.9 CM
ECHO TV REGURGITANT MAX VELOCITY: 2.5 M/S
EKG ATRIAL RATE: 68 BPM
EKG DIAGNOSIS: NORMAL
EKG P AXIS: 97 DEGREES
EKG P-R INTERVAL: 201 MS
EKG Q-T INTERVAL: 387 MS
EKG QRS DURATION: 138 MS
EKG QTC CALCULATION (BAZETT): 412 MS
EKG R AXIS: 84 DEGREES
EKG T AXIS: 13 DEGREES
EKG VENTRICULAR RATE: 68 BPM
ERYTHROCYTE [DISTWIDTH] IN BLOOD BY AUTOMATED COUNT: 13.7 % (ref 11.9–14.6)
EST. AVERAGE GLUCOSE BLD GHB EST-MCNC: 139 MG/DL
GLUCOSE SERPL-MCNC: 159 MG/DL (ref 70–99)
HBA1C MFR BLD: 6.5 % (ref 0–5.6)
HCT VFR BLD AUTO: 35.9 % (ref 35.8–46.3)
HDLC SERPL-MCNC: 38 MG/DL (ref 40–60)
HDLC SERPL: 4.1 (ref 0–5)
HGB BLD-MCNC: 11.7 G/DL (ref 11.7–15.4)
LDLC SERPL CALC-MCNC: 67 MG/DL (ref 0–100)
MCH RBC QN AUTO: 29.5 PG (ref 26.1–32.9)
MCHC RBC AUTO-ENTMCNC: 32.6 G/DL (ref 31.4–35)
MCV RBC AUTO: 90.7 FL (ref 82–102)
NRBC # BLD: 0 K/UL (ref 0–0.2)
PLATELET # BLD AUTO: 223 K/UL (ref 150–450)
PMV BLD AUTO: 9.7 FL (ref 9.4–12.3)
POTASSIUM SERPL-SCNC: 4.6 MMOL/L (ref 3.5–5.1)
RBC # BLD AUTO: 3.96 M/UL (ref 4.05–5.2)
SODIUM SERPL-SCNC: 137 MMOL/L (ref 136–145)
TRIGL SERPL-MCNC: 257 MG/DL (ref 0–150)
VLDLC SERPL CALC-MCNC: 51 MG/DL (ref 6–23)
WBC # BLD AUTO: 6.5 K/UL (ref 4.3–11.1)

## 2025-06-17 PROCEDURE — 36415 COLL VENOUS BLD VENIPUNCTURE: CPT

## 2025-06-17 PROCEDURE — C8929 TTE W OR WO FOL WCON,DOPPLER: HCPCS

## 2025-06-17 PROCEDURE — 80061 LIPID PANEL: CPT

## 2025-06-17 PROCEDURE — 2500000003 HC RX 250 WO HCPCS: Performed by: FAMILY MEDICINE

## 2025-06-17 PROCEDURE — 97165 OT EVAL LOW COMPLEX 30 MIN: CPT

## 2025-06-17 PROCEDURE — 97161 PT EVAL LOW COMPLEX 20 MIN: CPT

## 2025-06-17 PROCEDURE — 96372 THER/PROPH/DIAG INJ SC/IM: CPT

## 2025-06-17 PROCEDURE — 97535 SELF CARE MNGMENT TRAINING: CPT

## 2025-06-17 PROCEDURE — 93010 ELECTROCARDIOGRAM REPORT: CPT | Performed by: INTERNAL MEDICINE

## 2025-06-17 PROCEDURE — 85027 COMPLETE CBC AUTOMATED: CPT

## 2025-06-17 PROCEDURE — 6360000004 HC RX CONTRAST MEDICATION: Performed by: FAMILY MEDICINE

## 2025-06-17 PROCEDURE — 6370000000 HC RX 637 (ALT 250 FOR IP): Performed by: INTERNAL MEDICINE

## 2025-06-17 PROCEDURE — 83036 HEMOGLOBIN GLYCOSYLATED A1C: CPT

## 2025-06-17 PROCEDURE — 97530 THERAPEUTIC ACTIVITIES: CPT

## 2025-06-17 PROCEDURE — 70551 MRI BRAIN STEM W/O DYE: CPT

## 2025-06-17 PROCEDURE — 6360000002 HC RX W HCPCS: Performed by: FAMILY MEDICINE

## 2025-06-17 PROCEDURE — 6370000000 HC RX 637 (ALT 250 FOR IP): Performed by: FAMILY MEDICINE

## 2025-06-17 PROCEDURE — 93306 TTE W/DOPPLER COMPLETE: CPT | Performed by: INTERNAL MEDICINE

## 2025-06-17 PROCEDURE — G0378 HOSPITAL OBSERVATION PER HR: HCPCS

## 2025-06-17 PROCEDURE — 80048 BASIC METABOLIC PNL TOTAL CA: CPT

## 2025-06-17 RX ORDER — AMLODIPINE BESYLATE 10 MG/1
10 TABLET ORAL DAILY
Status: DISCONTINUED | OUTPATIENT
Start: 2025-06-17 | End: 2025-06-18

## 2025-06-17 RX ORDER — HYDRALAZINE HYDROCHLORIDE 25 MG/1
25 TABLET, FILM COATED ORAL EVERY 8 HOURS PRN
Status: DISCONTINUED | OUTPATIENT
Start: 2025-06-17 | End: 2025-06-19 | Stop reason: HOSPADM

## 2025-06-17 RX ORDER — ISOSORBIDE MONONITRATE 30 MG/1
30 TABLET, EXTENDED RELEASE ORAL DAILY
Status: DISCONTINUED | OUTPATIENT
Start: 2025-06-17 | End: 2025-06-18

## 2025-06-17 RX ORDER — CLOPIDOGREL BISULFATE 75 MG/1
75 TABLET ORAL DAILY
Status: DISCONTINUED | OUTPATIENT
Start: 2025-06-18 | End: 2025-06-19 | Stop reason: HOSPADM

## 2025-06-17 RX ORDER — CLOPIDOGREL BISULFATE 75 MG/1
300 TABLET ORAL ONCE
Status: COMPLETED | OUTPATIENT
Start: 2025-06-17 | End: 2025-06-17

## 2025-06-17 RX ADMIN — ISOSORBIDE MONONITRATE 30 MG: 30 TABLET, EXTENDED RELEASE ORAL at 17:24

## 2025-06-17 RX ADMIN — LOSARTAN POTASSIUM 50 MG: 50 TABLET, FILM COATED ORAL at 09:50

## 2025-06-17 RX ADMIN — ENOXAPARIN SODIUM 40 MG: 100 INJECTION SUBCUTANEOUS at 09:50

## 2025-06-17 RX ADMIN — SODIUM CHLORIDE, PRESERVATIVE FREE 10 ML: 5 INJECTION INTRAVENOUS at 09:50

## 2025-06-17 RX ADMIN — OXYCODONE HYDROCHLORIDE AND ACETAMINOPHEN 500 MG: 500 TABLET ORAL at 09:50

## 2025-06-17 RX ADMIN — SODIUM CHLORIDE, PRESERVATIVE FREE 10 ML: 5 INJECTION INTRAVENOUS at 01:03

## 2025-06-17 RX ADMIN — SODIUM CHLORIDE, PRESERVATIVE FREE 10 ML: 5 INJECTION INTRAVENOUS at 20:13

## 2025-06-17 RX ADMIN — CARVEDILOL 3.12 MG: 3.12 TABLET, FILM COATED ORAL at 17:24

## 2025-06-17 RX ADMIN — CLOPIDOGREL BISULFATE 300 MG: 75 TABLET, FILM COATED ORAL at 15:38

## 2025-06-17 RX ADMIN — SULFUR HEXAFLUORIDE 2 ML: KIT at 09:49

## 2025-06-17 RX ADMIN — PANTOPRAZOLE SODIUM 40 MG: 40 TABLET, DELAYED RELEASE ORAL at 05:54

## 2025-06-17 RX ADMIN — ASPIRIN 81 MG: 81 TABLET, CHEWABLE ORAL at 09:50

## 2025-06-17 RX ADMIN — AMLODIPINE BESYLATE 10 MG: 10 TABLET ORAL at 15:38

## 2025-06-17 RX ADMIN — CARVEDILOL 3.12 MG: 3.12 TABLET, FILM COATED ORAL at 09:50

## 2025-06-17 ASSESSMENT — PAIN SCALES - GENERAL
PAINLEVEL_OUTOF10: 0
PAINLEVEL_OUTOF10: 0

## 2025-06-17 NOTE — PROGRESS NOTES
Stroke Education provided to patient and the following topics were discussed    1. Patients personal risk factors for stroke are hyperlipidemia    2. Warning signs of Stroke:        * Sudden numbness or weakness of the face, arm or leg, especially on one side of          The body            * Sudden confusion, trouble speaking or understanding        * Sudden trouble seeing in one or both eyes        * Sudden trouble walking, dizziness, loss of balance or coordination        * Sudden severe headache with no known cause      3. Importance of activation Emergency Medical Services ( 9-1-1 ) immediately if experience any warning signs of stroke.    4. Be sure and schedule a follow-up appointment with your primary care doctor or any specialists as instructed.     5. You must take medicine every day to treat your risk factors for stroke.  Be sure to take your medicines exactly as your doctor tells you: no more, no less.  Know what your medicines are for , what they do.  Anti-thrombotics /anticoagulants can help prevent strokes.  You are taking the following medicine(s)  LEQVIO SC     6.  Smoking and second-hand smoke greatly increase your risk of stroke, cardiovascular disease and death. Smoking history never    7. Information provided was BS Stroke Education Binder    8. Documentation of teaching completed in Patient Education Activity and on Care Plan with teaching response noted?  yes

## 2025-06-17 NOTE — PROGRESS NOTES
ACUTE OCCUPATIONAL THERAPY GOALS:   (Developed with and agreed upon by patient and/or caregiver.)  1. Patient will perform toileting and toilet transfer with independence. -GOAL MET 6/17/25    2. Patient will perform ADL functional mobility and tranfers in room with independence. -GOAL MET 6/17/25         OCCUPATIONAL THERAPY Initial Assessment, Discharge, and AM       OT Visit Days: 1  Acknowledge Orders  Time  OT Charge Capture  Rehab Caseload Tracker      Rhnoda Poon is a 80 y.o. female   PRIMARY DIAGNOSIS: Amaurosis fugax of left eye  Essential hypertension [I10]  Amaurosis fugax of left eye [G45.3]       Reason for Referral: Generalized Muscle Weakness (M62.81)  Observation: Payor: MEDICARE / Plan: MEDICARE PART A AND B / Product Type: *No Product type* /     ASSESSMENT:     REHAB RECOMMENDATIONS:   Recommendation to date pending progress:  Setting:  No further skilled occupational therapy after discharge from hospital    Equipment:    None     ASSESSMENT:  Ms. Poon is 80 year old female with past medical history of anemia, Hernandez's esophagus, coronary atherosclerosis of native coronary vessel, GERD, HLD, hypercholesterolemia, hypertension, mild aortic stenosis, occlusion and stenosis of carotid artery without mention of cerebral infarction, OA, PAD and s/p insertion of iliac artery stent and shingles. Pt presented to the ER with report of sudden onset left eye blindness. It lasted about 10 minutes and slowly returned back to baseline. Pt lives with her spouse and is normally independent with ADLs and functional mobility. This am, pt continues to demonstrate independence with functional household distance ambulation and ADLs (toileting and grooming). No skilled OT indicated at this time. Will discharge OT.      [facility] AM-PAC™ “6 Clicks” Daily Activity Inpatient Short Form:    AM-PAC Daily Activity - Inpatient   How much help is needed for putting on and taking off regular lower body  clothing?: None  How much help is needed for bathing (which includes washing, rinsing, drying)?: None  How much help is needed for toileting (which includes using toilet, bedpan, or urinal)?: None  How much help is needed for putting on and taking off regular upper body clothing?: None  How much help is needed for taking care of personal grooming?: None  How much help for eating meals?: None  AM-PAC Inpatient Daily Activity Raw Score: 24  AM-PAC Inpatient ADL T-Scale Score : 57.54  ADL Inpatient CMS 0-100% Score: 0  ADL Inpatient CMS G-Code Modifier : CH           SUBJECTIVE:     Ms. Poon states, \"I am good\"     Social/Functional Lives With: Spouse  Type of Home: House  Home Layout: One level  Home Access: Level entry (sunken living room)  Bathroom Shower/Tub: Walk-in shower  Prior Level of Assist for ADLs: Independent  Prior Level of Assist for Transfers: Independent    OBJECTIVE:     LINES / DRAINS / AIRWAY: None    RESTRICTIONS/PRECAUTIONS:       PAIN: VITALS / O2:   Pre Treatment:   Pain Assessment: None - Denies Pain  Complained of headache; RN notified    Post Treatment: none       Vitals          Oxygen   Room air         GROSS EVALUATION: INTACT IMPAIRED   (See Comments)   UE AROM [x] []   UE PROM [] []   Strength [x]       Posture / Balance [x]     Sensation [x]     Coordination [x]       Tone [x]       Edema []    Activity Tolerance [x]       Hand Dominance R [x] L []      COGNITION/  PERCEPTION: INTACT IMPAIRED   (See Comments)   Orientation [x]     Vision [x]     Hearing [x]     Cognition  [x]     Perception [x]       MOBILITY: I Mod I S SBA CGA Min Mod Max Total  NT x2 Comments:   Bed Mobility    Rolling [] [] [] [] [] [] [] [] [] [] []    Supine to Sit [x] [] [] [] [] [] [] [] [] [] []    Scooting [x] [] [] [] [] [] [] [] [] [] []    Sit to Supine [x] [] [] [] [] [] [] [] [] [] []    Transfers    Sit to Stand [x] [] [] [] [] [] [] [] [] [] []    Bed to Chair [x] [] [] [] [] [] [] [] [] [] []    Stand

## 2025-06-17 NOTE — CONSULTS
Transient PARRA on the left in the setting of left CCA stenosis (75%). Consider loading with Plavix and continuing 75 mg daily with aspirin. She needs a vascular surgery consultation.       Addendum:     Full note    Neurology consultation note    This is an 80-year-old woman with a history of carotid artery stenosis status post carotid endarterectomy in 2017 (bilateral).  The patient had an episode of transient vision loss on the left which lasted 5 to 10 minutes in duration and then resolved.  She was found to have 75% stenosis of the common carotid artery.  Currently, she is back to her neurological baseline.    Vitals:    06/17/25 0756 06/17/25 0758 06/17/25 0950 06/17/25 1150   BP: (!) 216/63 (!) 182/69 (!) 182/69 (!) 189/69   Pulse: 71 69  64   Resp: 17   16   Temp: 98.2 °F (36.8 °C)   97.9 °F (36.6 °C)   TempSrc: Oral   Oral   SpO2: 99% 98%  98%   Weight:       Height:           General - Well developed, well nourished, in no apparent distress. Pleasant and conversant.   HEENT - Normocephalic, atraumatic.  Neck -No masses   Lungs - Normal work of breathing   Abdomen - No masses   Extremities - No edema and no rashes.   Psychiatric - Mood and affect are normal    Neurological examination - Comprehension, attention , memory and reasoning are intact. Language and speech are normal. On cranial nerve examination pupils are equal round and reactive to light (cranial nerve II and III).  Visual acuity is adequate (cranial nerve II). Visual fields are full to finger confrontation (CN II). Extraocular motility is normal (CN III, IV, VI). Face is symmetric (CN VII). Hearing is grossly intact to verbal communication (CN VIII). Motor examination - There is normal bulk. Power is full throughout (with spontaneous movement against environmental objects). Cerebellar examination is normal with finger-no-nose testing. Gait and stance are normal.    Assessment and plan    80-year-old woman with symptomatic carotid artery stenosis

## 2025-06-17 NOTE — H&P
[facility] Initial History and Physical Note    Patient: Rhonda Poon Date: 6/16/2025  female, 80 y.o.  Admit Date: 6/16/2025  Attending: Hector Butler MD     ASSESSMENT AND PLAN:     Principal Problem:    Amaurosis fugax of left eye  Plan: Resolved, Concerning given clinical findings. CT head unremarkable. CTA shows no acute vascular abnormality of the head and neck with 75% stenosis present in the left common carotid artery, at the carotid bulb, moderate stenosis of the proximal left subclavian artery, and moderate stenosis of the origin of the right vertebral artery.  - Neurology consulted, does not recommend tPA.   - Observe on remote telemetry.   - Neurochecks Q4H, bedside swallow test.   - Check A1c, lipids, TSH  - MRI brain, TTE with bubble study in AM.  - PT/OT/ST consults.  - Start ASA 81 mg daily, high intensity statin.   - Permissive hypertension up to  unless patient has substantial symptoms  - Smoking/diabetes education if necessary  - Depression Screening prior to discharge   Active Problems:    Carotid stenosis, asymptomatic, left  Plan: Per above    Iron deficiency anemia  Plan: Follow CBC    CKD (chronic kidney disease), stage III (HCC)  Plan: Stable, follow BMP    Coronary atherosclerosis of native coronary vessel  Plan: Stable. Continue home meds.    Primary hypertension  Plan: Stable. Continue home meds.    PAD (peripheral artery disease)  Plan: Stable. Continue home meds.    Dyslipidemia  Plan: Stable. Continue home meds.       DVT Prophylaxis: Lovenox      Code Status: FULL CODE      Disposition: Observe on remote tele for evaluation and treatment as per above.      Anticipated discharge: < 2 midnights     CHIEF COMPLAINT:  vision loss    HISTORY OF PRESENT ILLNESS:      Patient Active Problem List   Diagnosis    Hernandez's esophagus    Coronary atherosclerosis of native coronary vessel    Primary hypertension    Iron deficiency anemia    Hypoxia    CKD (chronic kidney  Axis 97    R Axis 84    T Axis 13    Diagnosis Sinus rhythm  Right bundle branch block          Hector Butler MD  6/16/2025 11:01 PM

## 2025-06-17 NOTE — PROGRESS NOTES
ACUTE PHYSICAL THERAPY GOALS:   (Developed with and agreed upon by patient and/or caregiver.)  STG:  (1.)Ms. Poon will move from supine to sit and sit to supine  with INDEPENDENT within one treatment day(s).    (2.)Ms. Poon will transfer from bed to chair and chair to bed with INDEPENDENT using the least restrictive device within one treatment day(s).    (3.)Ms. Poon will ambulate with INDEPENDENT for 100 feet with the least restrictive device within one treatment day(s).     Pt meeting above goals.  ________________________________________________________________________________________________     PHYSICAL THERAPY Initial Assessment, Daily Note, and Discharge  (Link to Caseload Tracking: PT Visit Days : 1  Acknowledge Orders  Time In/Out  PT Charge Capture  Rehab Caseload Tracker    Rhonda Poon is a 80 y.o. female   PRIMARY DIAGNOSIS: Amaurosis fugax of left eye  Essential hypertension [I10]  Amaurosis fugax of left eye [G45.3]       Reason for Referral: Difficulty in walking, Not elsewhere classified (R26.2)  Other abnormalities of gait and mobility (R26.89)  Observation: Payor: MEDICARE / Plan: MEDICARE PART A AND B / Product Type: *No Product type* /     ASSESSMENT:     REHAB RECOMMENDATIONS:   Recommendation to date pending progress:  Setting:  No skilled therapy needs at this time    Equipment:    None     ASSESSMENT:  Ms. Poon is admitted with above diagnosis. Pt performed supine to sit to stand. Pt ambulated in room and hallway. Pt meeting above goals with no further care needed. Pt with no skilled needs at this time.     [facility] AM-PAC™ “6 Clicks” Basic Mobility Inpatient Short Form  AM-PAC Basic Mobility - Inpatient   How much help is needed turning from your back to your side while in a flat bed without using bedrails?: None  How much help is needed moving from lying on your back to sitting on the side of a flat bed without using bedrails?: None  How much help is needed moving to

## 2025-06-17 NOTE — ED NOTES
TRANSFER - OUT REPORT:    Verbal report given to MICHELLE Jones on Rhonda Poon  being transferred to 362 for routine progression of patient care       Report consisted of patient's Situation, Background, Assessment and   Recommendations(SBAR).     Information from the following report(s) ED SBAR was reviewed with the receiving nurse.    Lines:   Peripheral IV 06/16/25 Left;Proximal Forearm (Active)        Opportunity for questions and clarification was provided.      Patient transported with:  Registered Nurse

## 2025-06-17 NOTE — CONSULTS
Pt established with Dr. Odom. Dr. Saeed providing coverage this week and discussed her case with Dr. Sawant. Recommend dual antiplatelet therapy and cholesterol lowering medication. Given her cerebral amyloid angiopathy that puts her at high risk for intracerebral hemorrhage during carotid intervention, therefore, he would not recommend any surgical intervention. She will return to the office on 4/27/25 @ 8:45 to again discuss this with Dr. Odom in the clinic. Ok to discharge from a vascular standpoint when deemed stable by primary team.    Mar Smith NP/ Dr. Get Saeed

## 2025-06-17 NOTE — ACP (ADVANCE CARE PLANNING)
Advance Care Planning   General Advance Care Planning (ACP) Conversation    Date of Conversation: 6/16/2025  Conducted with: Patient with Decision Making Capacity  Other persons present: None    Healthcare Decision Maker:  No healthcare decision makers have been documented.    Today we documented Decision Maker(s) consistent with Legal Next of Kin hierarchy.  Content/Action Overview:  DECLINED ACP Conversation - will revisit periodically    Length of Voluntary ACP Conversation in minutes:  <16 minutes (Non-Billable)    Duane Orozco RN

## 2025-06-17 NOTE — CARE COORDINATION
CASE MANAGEMENT ASSESSMENT NOTE    Patient is an 80 year old female with Essential Hypertension.  CM consult received for CVA workup.      Patient assessment completed at bedside.  Patient presents to assessment alert and oriented, and answers questions appropriately.  She lives at home with spouse.  At baseline, she is independent with transfers.  Does not use assistive devices.  She is an active .  Patient has medicare A and B insurance as well as BCBS supplement.    Patient has been evaluated by PT and OT and they recommend no further needs.  Is pending ST consult    At this time, anticipate patient to be discharged home.  Case management will continue to follow.  Please notify if there are any changes.     Attending Physician: Emily Buchanan MD  Admit Problem: Essential hypertension [I10]  Amaurosis fugax of left eye [G45.3]  Date/Time of Admission: 6/16/2025  6:24 PM  Problem List:  Patient Active Problem List   Diagnosis    Hernandez's esophagus    Coronary atherosclerosis of native coronary vessel    Primary hypertension    Iron deficiency anemia    Hypoxia    CKD (chronic kidney disease), stage III (HCC)    Cancer (HCC)    PAD (peripheral artery disease)    Hypomagnesemia    Post-operative nausea and vomiting    Nonrheumatic aortic valve stenosis    Dyslipidemia    Carotid stenosis, asymptomatic, left    RBBB    Amaurosis fugax of left eye          06/17/25 1142   Service Assessment   Patient Orientation Alert and Oriented   Cognition Alert   History Provided By Patient   Primary Caregiver Self   Accompanied By/Relationship N/A   Support Systems Spouse/Significant Other   Patient's Healthcare Decision Maker is: Legal Next of Kin   PCP Verified by CM Yes  (Dr. Sveta Hawthorne)   Last Visit to PCP Within last 3 months   Prior Functional Level Independent in ADLs/IADLs   Current Functional Level Independent in ADLs/IADLs   Can patient return to prior living arrangement Yes   Ability to make needs known: Good

## 2025-06-17 NOTE — PLAN OF CARE
Problem: Discharge Planning  Goal: Discharge to home or other facility with appropriate resources  6/17/2025 1102 by Torsten Jeong RN  Outcome: Progressing  Flowsheets (Taken 6/17/2025 1102)  Discharge to home or other facility with appropriate resources: Identify barriers to discharge with patient and caregiver  6/17/2025 0001 by Karen Dumas RN  Outcome: Progressing     Problem: Pain  Goal: Verbalizes/displays adequate comfort level or baseline comfort level  6/17/2025 1102 by Torsten Jeong RN  Outcome: Progressing  Flowsheets (Taken 6/17/2025 1102)  Verbalizes/displays adequate comfort level or baseline comfort level: Encourage patient to monitor pain and request assistance  6/17/2025 0001 by Karen Dumas RN  Outcome: Progressing     Problem: Chronic Conditions and Co-morbidities  Goal: Patient's chronic conditions and co-morbidity symptoms are monitored and maintained or improved  Outcome: Progressing  Flowsheets (Taken 6/17/2025 1102)  Care Plan - Patient's Chronic Conditions and Co-Morbidity Symptoms are Monitored and Maintained or Improved: Monitor and assess patient's chronic conditions and comorbid symptoms for stability, deterioration, or improvement     Problem: Neurosensory - Adult  Goal: Achieves maximal functionality and self care  Outcome: Progressing  Flowsheets (Taken 6/17/2025 1102)  Achieves maximal functionality and self care: Encourage and assist patient to increase activity and self care with guidance from physical therapy/occupational therapy

## 2025-06-17 NOTE — PROGRESS NOTES
SPEECH LANGUAGE PATHOLOGY: ATTEMPT     NAME: Rhonda Poon  : 1945  MRN: [id number]    ADMISSION DATE: 2025  PRIMARY DIAGNOSIS: Amaurosis fugax of left eye    Speech Therapy consult received and appreciated. Patient undergoing ECHO at time of SLP attempt. Will follow up later today.      ADDENDUM: Patient unavailable at time of second and third attempt; first working with MD, then leaving for MRI with transport.     Per discussion with MD, no speech therapy concerns. Symptoms isolated to visual changes which have now resolved. Passed nursing swallow screen.   Will discontinue orders per MD.  Speech therapy will remain available by consult if new concerns arise at a later time.     Dori Love, SLP  2025 8:27 AM

## 2025-06-18 PROBLEM — I16.9 HYPERTENSIVE CRISIS: Status: ACTIVE | Noted: 2025-06-18

## 2025-06-18 PROBLEM — E85.9 AMYLOIDOSIS (HCC): Status: ACTIVE | Noted: 2025-06-18

## 2025-06-18 LAB
ANION GAP SERPL CALC-SCNC: 11 MMOL/L (ref 7–16)
BUN SERPL-MCNC: 28 MG/DL (ref 8–23)
CALCIUM SERPL-MCNC: 9.5 MG/DL (ref 8.8–10.2)
CHLORIDE SERPL-SCNC: 105 MMOL/L (ref 98–107)
CO2 SERPL-SCNC: 20 MMOL/L (ref 20–29)
CREAT SERPL-MCNC: 1.64 MG/DL (ref 0.6–1.1)
ERYTHROCYTE [DISTWIDTH] IN BLOOD BY AUTOMATED COUNT: 13.8 % (ref 11.9–14.6)
GLUCOSE SERPL-MCNC: 146 MG/DL (ref 70–99)
HCT VFR BLD AUTO: 31.5 % (ref 35.8–46.3)
HGB BLD-MCNC: 10.4 G/DL (ref 11.7–15.4)
MCH RBC QN AUTO: 30.4 PG (ref 26.1–32.9)
MCHC RBC AUTO-ENTMCNC: 33 G/DL (ref 31.4–35)
MCV RBC AUTO: 92.1 FL (ref 82–102)
NRBC # BLD: 0 K/UL (ref 0–0.2)
PLATELET # BLD AUTO: 224 K/UL (ref 150–450)
PMV BLD AUTO: 9.8 FL (ref 9.4–12.3)
POTASSIUM SERPL-SCNC: 4.6 MMOL/L (ref 3.5–5.1)
RBC # BLD AUTO: 3.42 M/UL (ref 4.05–5.2)
SODIUM SERPL-SCNC: 136 MMOL/L (ref 136–145)
WBC # BLD AUTO: 6.1 K/UL (ref 4.3–11.1)

## 2025-06-18 PROCEDURE — 96372 THER/PROPH/DIAG INJ SC/IM: CPT

## 2025-06-18 PROCEDURE — 6370000000 HC RX 637 (ALT 250 FOR IP): Performed by: INTERNAL MEDICINE

## 2025-06-18 PROCEDURE — G0378 HOSPITAL OBSERVATION PER HR: HCPCS

## 2025-06-18 PROCEDURE — 80048 BASIC METABOLIC PNL TOTAL CA: CPT

## 2025-06-18 PROCEDURE — 2500000003 HC RX 250 WO HCPCS: Performed by: FAMILY MEDICINE

## 2025-06-18 PROCEDURE — 36415 COLL VENOUS BLD VENIPUNCTURE: CPT

## 2025-06-18 PROCEDURE — 6370000000 HC RX 637 (ALT 250 FOR IP): Performed by: FAMILY MEDICINE

## 2025-06-18 PROCEDURE — 85027 COMPLETE CBC AUTOMATED: CPT

## 2025-06-18 PROCEDURE — 6360000002 HC RX W HCPCS: Performed by: FAMILY MEDICINE

## 2025-06-18 RX ORDER — ISOSORBIDE MONONITRATE 30 MG/1
30 TABLET, EXTENDED RELEASE ORAL DAILY
Status: DISCONTINUED | OUTPATIENT
Start: 2025-06-18 | End: 2025-06-18

## 2025-06-18 RX ORDER — AMLODIPINE BESYLATE 5 MG/1
5 TABLET ORAL DAILY
Status: DISCONTINUED | OUTPATIENT
Start: 2025-06-18 | End: 2025-06-18

## 2025-06-18 RX ORDER — AMLODIPINE BESYLATE 5 MG/1
5 TABLET ORAL ONCE
Status: COMPLETED | OUTPATIENT
Start: 2025-06-18 | End: 2025-06-18

## 2025-06-18 RX ORDER — CARVEDILOL 3.12 MG/1
3.12 TABLET ORAL
Status: COMPLETED | OUTPATIENT
Start: 2025-06-18 | End: 2025-06-18

## 2025-06-18 RX ORDER — ENOXAPARIN SODIUM 100 MG/ML
30 INJECTION SUBCUTANEOUS DAILY
Status: DISCONTINUED | OUTPATIENT
Start: 2025-06-19 | End: 2025-06-19 | Stop reason: HOSPADM

## 2025-06-18 RX ORDER — AMLODIPINE BESYLATE 5 MG/1
5 TABLET ORAL ONCE
Status: DISCONTINUED | OUTPATIENT
Start: 2025-06-18 | End: 2025-06-18

## 2025-06-18 RX ORDER — ISOSORBIDE MONONITRATE 30 MG/1
15 TABLET, EXTENDED RELEASE ORAL DAILY
Status: DISCONTINUED | OUTPATIENT
Start: 2025-06-18 | End: 2025-06-19

## 2025-06-18 RX ORDER — LOSARTAN POTASSIUM 50 MG/1
50 TABLET ORAL
Status: COMPLETED | OUTPATIENT
Start: 2025-06-18 | End: 2025-06-18

## 2025-06-18 RX ORDER — AMLODIPINE BESYLATE 10 MG/1
10 TABLET ORAL DAILY
Status: DISCONTINUED | OUTPATIENT
Start: 2025-06-19 | End: 2025-06-19 | Stop reason: HOSPADM

## 2025-06-18 RX ADMIN — ASPIRIN 81 MG: 81 TABLET, CHEWABLE ORAL at 08:29

## 2025-06-18 RX ADMIN — CLOPIDOGREL BISULFATE 75 MG: 75 TABLET, FILM COATED ORAL at 08:29

## 2025-06-18 RX ADMIN — AMLODIPINE BESYLATE 5 MG: 5 TABLET ORAL at 16:53

## 2025-06-18 RX ADMIN — ATORVASTATIN CALCIUM 40 MG: 40 TABLET, FILM COATED ORAL at 20:40

## 2025-06-18 RX ADMIN — AMLODIPINE BESYLATE 5 MG: 5 TABLET ORAL at 13:44

## 2025-06-18 RX ADMIN — LOSARTAN POTASSIUM 50 MG: 50 TABLET, FILM COATED ORAL at 12:07

## 2025-06-18 RX ADMIN — CARVEDILOL 3.12 MG: 3.12 TABLET, FILM COATED ORAL at 16:53

## 2025-06-18 RX ADMIN — SODIUM CHLORIDE, PRESERVATIVE FREE 10 ML: 5 INJECTION INTRAVENOUS at 08:29

## 2025-06-18 RX ADMIN — OXYCODONE HYDROCHLORIDE AND ACETAMINOPHEN 500 MG: 500 TABLET ORAL at 08:29

## 2025-06-18 RX ADMIN — CARVEDILOL 3.12 MG: 3.12 TABLET, FILM COATED ORAL at 12:07

## 2025-06-18 RX ADMIN — PANTOPRAZOLE SODIUM 40 MG: 40 TABLET, DELAYED RELEASE ORAL at 05:48

## 2025-06-18 RX ADMIN — SODIUM CHLORIDE, PRESERVATIVE FREE 10 ML: 5 INJECTION INTRAVENOUS at 20:40

## 2025-06-18 RX ADMIN — ENOXAPARIN SODIUM 40 MG: 100 INJECTION SUBCUTANEOUS at 08:28

## 2025-06-18 RX ADMIN — ISOSORBIDE MONONITRATE 15 MG: 30 TABLET, EXTENDED RELEASE ORAL at 18:36

## 2025-06-18 NOTE — PROGRESS NOTES
Hospitalist Progress Note   Admit Date:  2025  6:24 PM   Name:  Rhonda Poon   Age:  80 y.o.  Sex:  female  :  1945   MRN:  [id number]   Room:  [facility]/    Presenting/Chief Complaint: Loss of Vision     Reason(s) for Admission: Essential hypertension [I10]  Amaurosis fugax of left eye [G45.3]     Hospital Course:   Rhonda Poon is a 80 y.o. female with medical history of hypertension, hyperlipidemia, CAD status post CABG, bilateral carotid artery stenosis status post carotid endarterectomy, anemia, multiple myeloma, PAD, who presents emergency room with who presented with acute onset of left eye blindness without pain that started around 6 PM on the evening of .  Symptom lasted for about 10 minutes and has resolved by the time patient arrived to the ED.  Denies any neurological symptoms such as weakness, numbness, dizziness or slurred speech.  CT head without any acute intracranial processes.  CTA head and neck with 75 stenosis present in the left common carotid artery at the carotid bulb.  Teleneurology recommending admission for further workup.  Not a candidate for tPA.    Subjective & 24hr Events:   Patient was seen and examined at bedside.  No acute complaints at this time.  Patient blood pressures were well-controlled yesterday evening into this morning.  However, patient blood pressures were held by staff due to SBP being in the 100s.  In the afternoon, patient blood pressures shot up to 160s and required administration of her medications.  Patient does not have any symptoms.      Assessment & Plan:   Cerebral amyloid angiopathy  Amaurosis fugax of left eye, resolved  Carotid artery stenosis, left  MRI brain with possible small cortical infarction with numerous peripheral predominant punctate chronic microhemorrhages favoring cerebral amyloid angiopathy.  Neurology was consulted and recommending tight blood pressure control with goal less than 130/80 and 2 antiplatelet therapy.  Also

## 2025-06-18 NOTE — PLAN OF CARE
Problem: Discharge Planning  Goal: Discharge to home or other facility with appropriate resources  Outcome: Progressing  Flowsheets (Taken 6/17/2025 1254)  Discharge to home or other facility with appropriate resources: Identify barriers to discharge with patient and caregiver     Problem: Pain  Goal: Verbalizes/displays adequate comfort level or baseline comfort level  Outcome: Progressing  Flowsheets (Taken 6/17/2025 1102)  Verbalizes/displays adequate comfort level or baseline comfort level: Encourage patient to monitor pain and request assistance     Problem: Chronic Conditions and Co-morbidities  Goal: Patient's chronic conditions and co-morbidity symptoms are monitored and maintained or improved  Outcome: Progressing  Flowsheets (Taken 6/17/2025 1254)  Care Plan - Patient's Chronic Conditions and Co-Morbidity Symptoms are Monitored and Maintained or Improved: Monitor and assess patient's chronic conditions and comorbid symptoms for stability, deterioration, or improvement     Problem: Neurosensory - Adult  Goal: Achieves maximal functionality and self care  Outcome: Progressing  Flowsheets (Taken 6/17/2025 1102)  Achieves maximal functionality and self care: Encourage and assist patient to increase activity and self care with guidance from physical therapy/occupational therapy

## 2025-06-18 NOTE — PROGRESS NOTES
Hospitalist Progress Note   Admit Date:  2025  6:24 PM   Name:  Rhonda Poon   Age:  80 y.o.  Sex:  female  :  1945   MRN:  [id number]   Room:  [facility]/    Presenting/Chief Complaint: Loss of Vision     Reason(s) for Admission: Essential hypertension [I10]  Amaurosis fugax of left eye [G45.3]     Hospital Course:   Rhonda Poon is a 80 y.o. female with medical history of hypertension, hyperlipidemia, CAD status post CABG, bilateral carotid artery stenosis status post carotid endarterectomy, anemia, multiple myeloma, PAD, who presents emergency room with who presented with acute onset of left eye blindness without pain that started around 6 PM on the evening of .  Symptom lasted for about 10 minutes and has resolved by the time patient arrived to the ED.  Denies any neurological symptoms such as weakness, numbness, dizziness or slurred speech.  CT head without any acute intracranial processes.  CTA head and neck with 75 stenosis present in the left common carotid artery at the carotid bulb.  Teleneurology recommending admission for further workup.  Not a candidate for tPA.    Subjective & 24hr Events:   Patient was seen and examined at bedside.  No acute complaints at this time.  No longer having any neurological symptoms.  Denies any vision difficulties, weakness, numbness.,      Assessment & Plan:   Cerebral amyloid angiopathy  Amaurosis fugax of left eye, resolved  Carotid artery stenosis, left  MRI brain with possible small cortical infarction with numerous peripheral predominant punctate chronic microhemorrhages favoring cerebral amyloid angiopathy.  Neurology was consulted and recommending tight blood pressure control with goal less than 130/80 and 2 antiplatelet therapy.  Also recommending follow-up with vascular surgery.    -Appreciate neurology's recommendation  -Continue with aspirin and Plavix  -Continue with atorvastatin  -Appreciate vascular surgery's recommendation: Patient to  results for input(s): \"COVID19\" in the last 72 hours.    Current Meds:  Current Facility-Administered Medications   Medication Dose Route Frequency    amLODIPine (NORVASC) tablet 5 mg  5 mg Oral Daily    clopidogrel (PLAVIX) tablet 75 mg  75 mg Oral Daily    hydrALAZINE (APRESOLINE) tablet 25 mg  25 mg Oral Q8H PRN    [Held by provider] isosorbide mononitrate (IMDUR) extended release tablet 30 mg  30 mg Oral Daily    carvedilol (COREG) tablet 3.125 mg  3.125 mg Oral BID WC    pantoprazole (PROTONIX) tablet 40 mg  40 mg Oral QAM AC    furosemide (LASIX) tablet 40 mg  40 mg Oral Daily PRN    losartan (COZAAR) tablet 50 mg  50 mg Oral Daily    potassium chloride (KLOR-CON M) extended release tablet 10 mEq  10 mEq Oral Daily PRN    ascorbic acid (VITAMIN C) tablet 500 mg  500 mg Oral Daily    sodium chloride flush 0.9 % injection 5-40 mL  5-40 mL IntraVENous 2 times per day    sodium chloride flush 0.9 % injection 5-40 mL  5-40 mL IntraVENous PRN    0.9 % sodium chloride infusion   IntraVENous PRN    ondansetron (ZOFRAN-ODT) disintegrating tablet 4 mg  4 mg Oral Q8H PRN    Or    ondansetron (ZOFRAN) injection 4 mg  4 mg IntraVENous Q6H PRN    polyethylene glycol (GLYCOLAX) packet 17 g  17 g Oral Daily PRN    aspirin chewable tablet 81 mg  81 mg Oral Daily    atorvastatin (LIPITOR) tablet 40 mg  40 mg Oral Nightly    enoxaparin (LOVENOX) injection 40 mg  40 mg SubCUTAneous Daily       Signed:  Emily Buchanan MD    Part of this note may have been written by using a voice dictation software.  The note has been proof read but may still contain some grammatical/other typographical errors.

## 2025-06-18 NOTE — PLAN OF CARE
Problem: Discharge Planning  Goal: Discharge to home or other facility with appropriate resources  6/18/2025 1022 by Torsten Jeong RN  Outcome: Progressing  Flowsheets (Taken 6/17/2025 1254)  Discharge to home or other facility with appropriate resources: Identify barriers to discharge with patient and caregiver  6/18/2025 1017 by Torsten Jeong RN  Outcome: Progressing  Flowsheets (Taken 6/17/2025 1254)  Discharge to home or other facility with appropriate resources: Identify barriers to discharge with patient and caregiver     Problem: Pain  Goal: Verbalizes/displays adequate comfort level or baseline comfort level  6/18/2025 1022 by Torsten Jeong RN  Outcome: Progressing  Flowsheets (Taken 6/17/2025 1102)  Verbalizes/displays adequate comfort level or baseline comfort level: Encourage patient to monitor pain and request assistance  6/18/2025 1017 by Torsten Jeong RN  Outcome: Progressing  Flowsheets (Taken 6/17/2025 1102)  Verbalizes/displays adequate comfort level or baseline comfort level: Encourage patient to monitor pain and request assistance     Problem: Chronic Conditions and Co-morbidities  Goal: Patient's chronic conditions and co-morbidity symptoms are monitored and maintained or improved  6/18/2025 1022 by Torsten Jeong RN  Outcome: Progressing  Flowsheets (Taken 6/17/2025 1254)  Care Plan - Patient's Chronic Conditions and Co-Morbidity Symptoms are Monitored and Maintained or Improved: Monitor and assess patient's chronic conditions and comorbid symptoms for stability, deterioration, or improvement  6/18/2025 1017 by Torsten Jeong RN  Outcome: Progressing  Flowsheets (Taken 6/17/2025 1254)  Care Plan - Patient's Chronic Conditions and Co-Morbidity Symptoms are Monitored and Maintained or Improved: Monitor and assess patient's chronic conditions and comorbid symptoms for stability, deterioration, or improvement     Problem: Neurosensory - Adult  Goal: Achieves maximal functionality and

## 2025-06-19 VITALS
OXYGEN SATURATION: 97 % | DIASTOLIC BLOOD PRESSURE: 55 MMHG | WEIGHT: 154 LBS | RESPIRATION RATE: 16 BRPM | SYSTOLIC BLOOD PRESSURE: 137 MMHG | TEMPERATURE: 97.9 F | HEART RATE: 66 BPM | HEIGHT: 63 IN | BODY MASS INDEX: 27.29 KG/M2

## 2025-06-19 LAB
ANION GAP SERPL CALC-SCNC: 10 MMOL/L (ref 7–16)
BUN SERPL-MCNC: 42 MG/DL (ref 8–23)
CALCIUM SERPL-MCNC: 10.1 MG/DL (ref 8.8–10.2)
CHLORIDE SERPL-SCNC: 103 MMOL/L (ref 98–107)
CO2 SERPL-SCNC: 21 MMOL/L (ref 20–29)
CREAT SERPL-MCNC: 1.51 MG/DL (ref 0.6–1.1)
ERYTHROCYTE [DISTWIDTH] IN BLOOD BY AUTOMATED COUNT: 13.7 % (ref 11.9–14.6)
GLUCOSE SERPL-MCNC: 179 MG/DL (ref 70–99)
HCT VFR BLD AUTO: 30.2 % (ref 35.8–46.3)
HGB BLD-MCNC: 9.7 G/DL (ref 11.7–15.4)
MCH RBC QN AUTO: 29.7 PG (ref 26.1–32.9)
MCHC RBC AUTO-ENTMCNC: 32.1 G/DL (ref 31.4–35)
MCV RBC AUTO: 92.4 FL (ref 82–102)
NRBC # BLD: 0 K/UL (ref 0–0.2)
PLATELET # BLD AUTO: 217 K/UL (ref 150–450)
PMV BLD AUTO: 9.9 FL (ref 9.4–12.3)
POTASSIUM SERPL-SCNC: 4.7 MMOL/L (ref 3.5–5.1)
RBC # BLD AUTO: 3.27 M/UL (ref 4.05–5.2)
SODIUM SERPL-SCNC: 134 MMOL/L (ref 136–145)
WBC # BLD AUTO: 5.6 K/UL (ref 4.3–11.1)

## 2025-06-19 PROCEDURE — 1100000000 HC RM PRIVATE

## 2025-06-19 PROCEDURE — 85027 COMPLETE CBC AUTOMATED: CPT

## 2025-06-19 PROCEDURE — 6370000000 HC RX 637 (ALT 250 FOR IP): Performed by: INTERNAL MEDICINE

## 2025-06-19 PROCEDURE — 96372 THER/PROPH/DIAG INJ SC/IM: CPT

## 2025-06-19 PROCEDURE — 80048 BASIC METABOLIC PNL TOTAL CA: CPT

## 2025-06-19 PROCEDURE — 2500000003 HC RX 250 WO HCPCS: Performed by: FAMILY MEDICINE

## 2025-06-19 PROCEDURE — 6370000000 HC RX 637 (ALT 250 FOR IP): Performed by: FAMILY MEDICINE

## 2025-06-19 PROCEDURE — 36415 COLL VENOUS BLD VENIPUNCTURE: CPT

## 2025-06-19 PROCEDURE — G0378 HOSPITAL OBSERVATION PER HR: HCPCS

## 2025-06-19 PROCEDURE — 6360000002 HC RX W HCPCS: Performed by: INTERNAL MEDICINE

## 2025-06-19 RX ORDER — ISOSORBIDE MONONITRATE 30 MG/1
30 TABLET, EXTENDED RELEASE ORAL DAILY
Qty: 30 TABLET | Refills: 1 | Status: SHIPPED | OUTPATIENT
Start: 2025-06-20

## 2025-06-19 RX ORDER — ATORVASTATIN CALCIUM 40 MG/1
40 TABLET, FILM COATED ORAL NIGHTLY
Qty: 30 TABLET | Refills: 1 | Status: SHIPPED | OUTPATIENT
Start: 2025-06-19

## 2025-06-19 RX ORDER — ISOSORBIDE MONONITRATE 30 MG/1
30 TABLET, EXTENDED RELEASE ORAL DAILY
Status: DISCONTINUED | OUTPATIENT
Start: 2025-06-19 | End: 2025-06-19 | Stop reason: HOSPADM

## 2025-06-19 RX ORDER — CLOPIDOGREL BISULFATE 75 MG/1
75 TABLET ORAL DAILY
Qty: 19 TABLET | Refills: 0 | Status: SHIPPED | OUTPATIENT
Start: 2025-06-20 | End: 2025-07-09

## 2025-06-19 RX ORDER — AMLODIPINE BESYLATE 10 MG/1
10 TABLET ORAL DAILY
Qty: 30 TABLET | Refills: 1 | Status: SHIPPED | OUTPATIENT
Start: 2025-06-20

## 2025-06-19 RX ADMIN — ASPIRIN 81 MG: 81 TABLET, CHEWABLE ORAL at 09:06

## 2025-06-19 RX ADMIN — CLOPIDOGREL BISULFATE 75 MG: 75 TABLET, FILM COATED ORAL at 09:06

## 2025-06-19 RX ADMIN — PANTOPRAZOLE SODIUM 40 MG: 40 TABLET, DELAYED RELEASE ORAL at 06:03

## 2025-06-19 RX ADMIN — OXYCODONE HYDROCHLORIDE AND ACETAMINOPHEN 500 MG: 500 TABLET ORAL at 09:06

## 2025-06-19 RX ADMIN — LOSARTAN POTASSIUM 50 MG: 50 TABLET, FILM COATED ORAL at 09:06

## 2025-06-19 RX ADMIN — AMLODIPINE BESYLATE 10 MG: 10 TABLET ORAL at 09:06

## 2025-06-19 RX ADMIN — ISOSORBIDE MONONITRATE 30 MG: 30 TABLET, EXTENDED RELEASE ORAL at 09:05

## 2025-06-19 RX ADMIN — ENOXAPARIN SODIUM 30 MG: 100 INJECTION SUBCUTANEOUS at 09:05

## 2025-06-19 RX ADMIN — CARVEDILOL 3.12 MG: 3.12 TABLET, FILM COATED ORAL at 09:06

## 2025-06-19 RX ADMIN — SODIUM CHLORIDE, PRESERVATIVE FREE 10 ML: 5 INJECTION INTRAVENOUS at 09:09

## 2025-06-19 NOTE — DISCHARGE SUMMARY
Hospitalist Discharge Summary   Admit Date:  2025  6:24 PM   DC Note date: 2025  Name:  Rhonda Poon   Age:  80 y.o.  Sex:  female  :  1945   MRN:  [id number]   Room:  [facility]  PCP:  Sveta Hawthorne DO    Presenting Complaint: Loss of Vision     Initial Admission Diagnosis: Essential hypertension [I10]  Amaurosis fugax of left eye [G45.3]  Hypertensive crisis [I16.9]     Problem List for this Hospitalization (present on admission):    Principal Problem:    Hypertensive crisis  Active Problems:    Coronary atherosclerosis of native coronary vessel    Primary hypertension    Iron deficiency anemia    CKD (chronic kidney disease), stage III (HCC)    PAD (peripheral artery disease)    Dyslipidemia    Carotid stenosis, asymptomatic, left    Amaurosis fugax of left eye    Amyloidosis (HCC)  Resolved Problems:    * No resolved hospital problems. *      Hospital Course:  Please refer to the admission H&P for details of presentation. In summary, Rhonda Poon is a 80 y.o. female with past medical history significant for hypertension, hyperlipidemia, CAD status post CABG, bilateral carotid artery stenosis status post carotid endarterectomy, anemia, multiple myeloma, PAD, who presents emergency room with who presented with acute onset of left eye blindness without pain that started around 6 PM on the evening of .  Symptom lasted for about 10 minutes and has resolved by the time patient arrived to the ED.  Denies any neurological symptoms such as weakness, numbness, dizziness or slurred speech.  CT head without any acute intracranial processes.  CTA head and neck with 75 stenosis present in the left common carotid artery at the carotid bulb.  Teleneurology recommending admission for further workup.  Not a candidate for tPA.     MRI brain with possible small cortical infarction with numerous peripheral predominant punctate chronic microhemorrhages favoring cerebral amyloid angiopathy. Neurology was  normal.  Pupils equally round.    HENT:  Nares appear normal, no drainage.  Moist mucous membranes  Neck:  No restricted ROM.  Trachea midline  CV:   RRR.  No m/r/g.  No JVD  Lungs:   CTAB.  No wheezing.  Respirations even, unlabored  Abdomen:   Soft, nontender, nondistended.    Extremities: Warm and dry.   No edema.    Skin:     No rashes.  Normal coloration  Neuro:  CN II-XII grossly intact.  Psych:  Normal mood and affect.        Time spent in patient discharge and coordination 40 minutes.      Signed:  Emily Buchanan MD    Part of this note may have been written by using a voice dictation software.  The note has been proof read but may still contain some grammatical/other typographical errors.

## 2025-06-19 NOTE — PLAN OF CARE
Problem: Discharge Planning  Goal: Discharge to home or other facility with appropriate resources  6/19/2025 1218 by Rach Kaye RN  Outcome: Progressing  6/19/2025 0210 by Ferdinand Grossman RN  Outcome: Progressing     Problem: Pain  Goal: Verbalizes/displays adequate comfort level or baseline comfort level  6/19/2025 1218 by Rach Kaye RN  Outcome: Progressing  6/19/2025 0210 by Ferdinand Grossman RN  Outcome: Progressing     Problem: Chronic Conditions and Co-morbidities  Goal: Patient's chronic conditions and co-morbidity symptoms are monitored and maintained or improved  6/19/2025 1218 by Rach Kaye RN  Outcome: Progressing  6/19/2025 0210 by Ferdinand Grossman RN  Outcome: Progressing     Problem: Neurosensory - Adult  Goal: Achieves maximal functionality and self care  6/19/2025 1218 by Rach Kaye RN  Outcome: Progressing  6/19/2025 0210 by Ferdinand Grossman RN  Outcome: Progressing

## 2025-06-19 NOTE — CONSULTS
-Patient chart reviewed. Denied for inpatient rehab services at this time, currently does not meet diagnostic admission criteria for IRF level setting:   Patient currently functioning at too high of level to meet admission criteria  -Thank you for this consult. PM&R to continue monitoring functional improvements and rehab potential during acute hospital stay

## 2025-06-19 NOTE — CARE COORDINATION
Patient with discharge orders for today.  Referral made to [facility] to establish PCP services.  No needs per PT, OT. No additional needs made known to CM. Patient has met all treatment goals and milestones for discharge. Family to provide transportation home. CM following until patient is discharged.        06/19/25 1309   Services At/After Discharge   Transition of Care Consult (CM Consult) N/A   Services At/After Discharge None   [place] Resource Information Provided? No   Mode of Transport at Discharge Other (see comment)  (Family)   Confirm Follow Up Transport Family   Condition of Participation: Discharge Planning   The Plan for Transition of Care is related to the following treatment goals: Patient to discharge home and return to baseline level of function   The Patient and/or Patient Representative was provided with a Choice of Provider? Patient   The Patient and/Or Patient Representative agree with the Discharge Plan? Yes   Freedom of Choice list was provided with basic dialogue that supports the patient's individualized plan of care/goals, treatment preferences, and shares the quality data associated with the providers?  Yes

## 2025-06-27 ENCOUNTER — OFFICE VISIT (OUTPATIENT)
Dept: VASCULAR SURGERY | Age: 80
End: 2025-06-27
Payer: MEDICARE

## 2025-06-27 ENCOUNTER — TELEPHONE (OUTPATIENT)
Age: 80
End: 2025-06-27

## 2025-06-27 VITALS
OXYGEN SATURATION: 98 % | SYSTOLIC BLOOD PRESSURE: 198 MMHG | WEIGHT: 152 LBS | HEART RATE: 81 BPM | HEIGHT: 63 IN | BODY MASS INDEX: 26.93 KG/M2 | DIASTOLIC BLOOD PRESSURE: 75 MMHG

## 2025-06-27 DIAGNOSIS — I65.23 BILATERAL CAROTID ARTERY STENOSIS: Primary | ICD-10-CM

## 2025-06-27 PROCEDURE — 1123F ACP DISCUSS/DSCN MKR DOCD: CPT | Performed by: SURGERY

## 2025-06-27 PROCEDURE — 1090F PRES/ABSN URINE INCON ASSESS: CPT | Performed by: SURGERY

## 2025-06-27 PROCEDURE — 3077F SYST BP >= 140 MM HG: CPT | Performed by: SURGERY

## 2025-06-27 PROCEDURE — 99213 OFFICE O/P EST LOW 20 MIN: CPT | Performed by: SURGERY

## 2025-06-27 PROCEDURE — G8417 CALC BMI ABV UP PARAM F/U: HCPCS | Performed by: SURGERY

## 2025-06-27 PROCEDURE — 3078F DIAST BP <80 MM HG: CPT | Performed by: SURGERY

## 2025-06-27 PROCEDURE — G8428 CUR MEDS NOT DOCUMENT: HCPCS | Performed by: SURGERY

## 2025-06-27 PROCEDURE — 1111F DSCHRG MED/CURRENT MED MERGE: CPT | Performed by: SURGERY

## 2025-06-27 PROCEDURE — 1036F TOBACCO NON-USER: CPT | Performed by: SURGERY

## 2025-06-27 PROCEDURE — G8399 PT W/DXA RESULTS DOCUMENT: HCPCS | Performed by: SURGERY

## 2025-06-27 NOTE — TELEPHONE ENCOUNTER
Should be okay to keep the July 7 appointment.   Patient called with Dr Mccord's response. Patient voiced understanding//[email or web address]

## 2025-06-27 NOTE — TELEPHONE ENCOUNTER
Patient had mild stroke after having Leqvio. Had stroke one week after she had the infusion.   Had MRI and CT of head  Can't do vascular surgery due to brain bleed per Dr Odom and neurologist.  Left eye went completely dark. Was at [facility]   Patient wanted to know if she should be seen earlier than 7-7 or what is the next step.

## 2025-06-27 NOTE — TELEPHONE ENCOUNTER
Pt d/c from hosp 6/19 due to a mild stroke. The hosp said she also has a brain bleed. Denied carotid surg due to the bleed. I have her gena to see Dr. Mccord 7/7. She wants Daniela to call her.

## 2025-06-27 NOTE — PROGRESS NOTES
[address] [place] [id number]  [phone] FAX: [phone]    Rhonda Poon  : 1945    Chief Complaint:     History of Present Illness:   Patient follows up today for follow-up strep strokelike symptoms.  Patient was found to have a recurrent stenosis in the left common carotid artery.  She status post bilateral carotid endarterectomies.    CURRENT MEDICATIONS:  Current Outpatient Medications   Medication Sig Dispense Refill    amLODIPine (NORVASC) 10 MG tablet Take 1 tablet by mouth daily 30 tablet 1    atorvastatin (LIPITOR) 40 MG tablet Take 1 tablet by mouth nightly 30 tablet 1    clopidogrel (PLAVIX) 75 MG tablet Take 1 tablet by mouth daily for 19 days 19 tablet 0    isosorbide mononitrate (IMDUR) 30 MG extended release tablet Take 1 tablet by mouth daily 30 tablet 1    Inclisiran Sodium (LEQVIO SC) Inject into the skin      carvedilol (COREG) 3.125 MG tablet Take 1 tablet by mouth 2 times daily (with meals) 180 tablet 3    furosemide (LASIX) 40 MG tablet Take 1 tablet by mouth daily as needed (edema.) (Patient not taking: Reported on 2025) 90 tablet 3    losartan (COZAAR) 50 MG tablet Take 1 tablet by mouth daily 90 tablet 3    nitroGLYCERIN (NITROSTAT) 0.4 MG SL tablet Place 1 tablet under the tongue every 5 minutes as needed for Chest pain (Patient not taking: Reported on 2025) 25 tablet 1    potassium chloride (KLOR-CON M) 10 MEQ extended release tablet Take 1 tablet by mouth daily as needed (with lasix) (Patient not taking: Reported on 2025) 90 tablet 3    vitamin C (ASCORBIC ACID) 500 MG tablet Take 1 tablet by mouth daily      acetaminophen (TYLENOL) 325 MG tablet Take 1 tablet by mouth as needed      aspirin 81 MG EC tablet Take 1 tablet by mouth      esomeprazole (NEXIUM) 40 MG delayed release capsule Take 20 mg by mouth      vitamin E 400 UNIT capsule Take 1 capsule by mouth       No current facility-administered medications for this visit.

## 2025-06-28 NOTE — PROGRESS NOTES
Physician Progress Note      PATIENT:               FELICITA JOHNSON  CSN #:                  [id number]  :                       1945  ADMIT DATE:       2025 6:24 PM  DISCH DATE:        2025 1:38 PM  RESPONDING  PROVIDER #:        Emily Buchanan MD          QUERY TEXT:    Please clarify in documentation the relationship, if any, between left eye   amaurosis fugax and CVA. Are the conditions:    The clinical indicators include:  Per d/c summary, \"presents emergency room with who presented with acute onset   of left eye blindness without pain that started around 6 PM on the evening of   .  Symptom lasted for about 10 minutes and has resolved by the time   patient arrived to the ED...MRI brain with possible small cortical infarction   with numerous peripheral predominant punctate chronic microhemorrhages   favoring cerebral amyloid angiopathy.\"  -----  Per neurology consult : \"symptomatic carotid artery stenosis on the left   resulting in a transient retinal artery occlusion.  Typically, we would   recommend carotid intervention (carotid endarterectomy); however, this is   complicated by likely cerebral amyloid angiopathy.\"     MRI:  1.There is linear increased diffusion signal in the region of the posterior  left occipital cortex, without signal loss on the ADC map or associated T2  hyperintensity.This favors susceptibility artifact.If there are clinical  symptoms attributable to this area, a small cortical infarction would be  difficult to completely exclude.    2.There are numerous peripheral predominant punctate chronic microhemorrhages,  worst in the left occipital lobe, left parietal lobe, and left frontal lobe.  Although there is no associated cerebral convexity hemosiderin staining, the  peripheral distribution favors cerebral amyloid angiopathy.    Risk factors: carotid stenosis, cerebral amyloid angiopathy, age 80, HTN,   dyslipidemia  Treatment: labs, imaging, neuro & vasc consult,

## 2025-07-07 ENCOUNTER — OFFICE VISIT (OUTPATIENT)
Age: 80
End: 2025-07-07
Payer: MEDICARE

## 2025-07-07 VITALS
HEART RATE: 68 BPM | SYSTOLIC BLOOD PRESSURE: 132 MMHG | DIASTOLIC BLOOD PRESSURE: 72 MMHG | HEIGHT: 63 IN | BODY MASS INDEX: 27.25 KG/M2 | WEIGHT: 153.8 LBS

## 2025-07-07 DIAGNOSIS — G45.3 AMAUROSIS FUGAX OF LEFT EYE: Primary | ICD-10-CM

## 2025-07-07 DIAGNOSIS — I35.0 NONRHEUMATIC AORTIC VALVE STENOSIS: ICD-10-CM

## 2025-07-07 DIAGNOSIS — I10 PRIMARY HYPERTENSION: ICD-10-CM

## 2025-07-07 DIAGNOSIS — I45.10 RBBB: ICD-10-CM

## 2025-07-07 DIAGNOSIS — I25.10 ATHEROSCLEROSIS OF NATIVE CORONARY ARTERY OF NATIVE HEART WITHOUT ANGINA PECTORIS: ICD-10-CM

## 2025-07-07 DIAGNOSIS — E78.5 DYSLIPIDEMIA: ICD-10-CM

## 2025-07-07 PROCEDURE — 1111F DSCHRG MED/CURRENT MED MERGE: CPT | Performed by: INTERNAL MEDICINE

## 2025-07-07 PROCEDURE — G8427 DOCREV CUR MEDS BY ELIG CLIN: HCPCS | Performed by: INTERNAL MEDICINE

## 2025-07-07 PROCEDURE — 99214 OFFICE O/P EST MOD 30 MIN: CPT | Performed by: INTERNAL MEDICINE

## 2025-07-07 PROCEDURE — 1123F ACP DISCUSS/DSCN MKR DOCD: CPT | Performed by: INTERNAL MEDICINE

## 2025-07-07 PROCEDURE — 1159F MED LIST DOCD IN RCRD: CPT | Performed by: INTERNAL MEDICINE

## 2025-07-07 PROCEDURE — G8399 PT W/DXA RESULTS DOCUMENT: HCPCS | Performed by: INTERNAL MEDICINE

## 2025-07-07 PROCEDURE — G8417 CALC BMI ABV UP PARAM F/U: HCPCS | Performed by: INTERNAL MEDICINE

## 2025-07-07 PROCEDURE — 3078F DIAST BP <80 MM HG: CPT | Performed by: INTERNAL MEDICINE

## 2025-07-07 PROCEDURE — 1126F AMNT PAIN NOTED NONE PRSNT: CPT | Performed by: INTERNAL MEDICINE

## 2025-07-07 PROCEDURE — 1090F PRES/ABSN URINE INCON ASSESS: CPT | Performed by: INTERNAL MEDICINE

## 2025-07-07 PROCEDURE — 1036F TOBACCO NON-USER: CPT | Performed by: INTERNAL MEDICINE

## 2025-07-07 PROCEDURE — 3075F SYST BP GE 130 - 139MM HG: CPT | Performed by: INTERNAL MEDICINE

## 2025-07-07 RX ORDER — AMLODIPINE BESYLATE 2.5 MG/1
2.5 TABLET ORAL DAILY
Qty: 90 TABLET | Refills: 3 | Status: SHIPPED | OUTPATIENT
Start: 2025-07-07

## 2025-07-07 ASSESSMENT — ENCOUNTER SYMPTOMS
LEFT EYE: 1
SHORTNESS OF BREATH: 0

## 2025-07-07 NOTE — PROGRESS NOTES
Family history, Social History, and Medications were all reviewed with the patient today and updated as necessary.           Current Outpatient Medications:     amLODIPine (NORVASC) 2.5 MG tablet, Take 1 tablet by mouth daily, Disp: 90 tablet, Rfl: 3    atorvastatin (LIPITOR) 40 MG tablet, Take 1 tablet by mouth nightly, Disp: 30 tablet, Rfl: 1    clopidogrel (PLAVIX) 75 MG tablet, Take 1 tablet by mouth daily for 19 days, Disp: 19 tablet, Rfl: 0    carvedilol (COREG) 3.125 MG tablet, Take 1 tablet by mouth 2 times daily (with meals), Disp: 180 tablet, Rfl: 3    furosemide (LASIX) 40 MG tablet, Take 1 tablet by mouth daily as needed (edema.), Disp: 90 tablet, Rfl: 3    losartan (COZAAR) 50 MG tablet, Take 1 tablet by mouth daily, Disp: 90 tablet, Rfl: 3    nitroGLYCERIN (NITROSTAT) 0.4 MG SL tablet, Place 1 tablet under the tongue every 5 minutes as needed for Chest pain, Disp: 25 tablet, Rfl: 1    potassium chloride (KLOR-CON M) 10 MEQ extended release tablet, Take 1 tablet by mouth daily as needed (with lasix), Disp: 90 tablet, Rfl: 3    vitamin C (ASCORBIC ACID) 500 MG tablet, Take 1 tablet by mouth daily, Disp: , Rfl:     acetaminophen (TYLENOL) 325 MG tablet, Take 1 tablet by mouth as needed, Disp: , Rfl:     aspirin 81 MG EC tablet, Take 1 tablet by mouth, Disp: , Rfl:     esomeprazole (NEXIUM) 40 MG delayed release capsule, Take 20 mg by mouth, Disp: , Rfl:     vitamin E 400 UNIT capsule, Take 1 capsule by mouth, Disp: , Rfl:     Inclisiran Sodium (LEQVIO SC), Inject into the skin (Patient not taking: Reported on 7/7/2025), Disp: , Rfl:      Allergies   Allergen Reactions    Metoprolol Swelling     Pt states extremity swelling when taking this medication    Rosuvastatin Other (See Comments)     Pt states caused severe \"gout\" pain.        Patient Active Problem List    Diagnosis Date Noted    RBBB 05/16/2024     Priority: High    Amyloidosis (HCC) 06/18/2025     Priority: Low    Hypertensive crisis 06/18/2025

## 2025-07-21 ENCOUNTER — OFFICE VISIT (OUTPATIENT)
Dept: NEUROLOGY | Age: 80
End: 2025-07-21
Payer: MEDICARE

## 2025-07-21 VITALS
HEIGHT: 63 IN | HEART RATE: 87 BPM | DIASTOLIC BLOOD PRESSURE: 80 MMHG | SYSTOLIC BLOOD PRESSURE: 142 MMHG | BODY MASS INDEX: 26.58 KG/M2 | OXYGEN SATURATION: 100 % | WEIGHT: 150 LBS

## 2025-07-21 DIAGNOSIS — Z09 HOSPITAL DISCHARGE FOLLOW-UP: Primary | ICD-10-CM

## 2025-07-21 DIAGNOSIS — I10 HTN, GOAL BELOW 130/80: ICD-10-CM

## 2025-07-21 DIAGNOSIS — I65.22 STENOSIS OF LEFT CAROTID ARTERY WITHOUT CEREBRAL INFARCTION: ICD-10-CM

## 2025-07-21 DIAGNOSIS — G45.3 AMAUROSIS FUGAX OF LEFT EYE: ICD-10-CM

## 2025-07-21 DIAGNOSIS — I68.0 CEREBRAL AMYLOID ANGIOPATHY (CODE): ICD-10-CM

## 2025-07-21 DIAGNOSIS — E11.9 DIABETES MELLITUS TYPE II, NON INSULIN DEPENDENT (HCC): ICD-10-CM

## 2025-07-21 PROBLEM — K64.8 INTERNAL HEMORRHOIDS: Status: ACTIVE | Noted: 2025-07-21

## 2025-07-21 PROBLEM — D50.0 IRON DEFICIENCY ANEMIA DUE TO CHRONIC BLOOD LOSS: Status: ACTIVE | Noted: 2025-07-21

## 2025-07-21 PROBLEM — K55.20: Status: ACTIVE | Noted: 2025-07-21

## 2025-07-21 PROBLEM — K21.9 GERD (GASTROESOPHAGEAL REFLUX DISEASE): Status: ACTIVE | Noted: 2025-07-21

## 2025-07-21 PROCEDURE — 1111F DSCHRG MED/CURRENT MED MERGE: CPT | Performed by: NURSE PRACTITIONER

## 2025-07-21 PROCEDURE — G8427 DOCREV CUR MEDS BY ELIG CLIN: HCPCS | Performed by: NURSE PRACTITIONER

## 2025-07-21 PROCEDURE — 3079F DIAST BP 80-89 MM HG: CPT | Performed by: NURSE PRACTITIONER

## 2025-07-21 PROCEDURE — 3077F SYST BP >= 140 MM HG: CPT | Performed by: NURSE PRACTITIONER

## 2025-07-21 PROCEDURE — 1160F RVW MEDS BY RX/DR IN RCRD: CPT | Performed by: NURSE PRACTITIONER

## 2025-07-21 PROCEDURE — G8399 PT W/DXA RESULTS DOCUMENT: HCPCS | Performed by: NURSE PRACTITIONER

## 2025-07-21 PROCEDURE — 1036F TOBACCO NON-USER: CPT | Performed by: NURSE PRACTITIONER

## 2025-07-21 PROCEDURE — 1159F MED LIST DOCD IN RCRD: CPT | Performed by: NURSE PRACTITIONER

## 2025-07-21 PROCEDURE — 1123F ACP DISCUSS/DSCN MKR DOCD: CPT | Performed by: NURSE PRACTITIONER

## 2025-07-21 PROCEDURE — 1090F PRES/ABSN URINE INCON ASSESS: CPT | Performed by: NURSE PRACTITIONER

## 2025-07-21 PROCEDURE — 1126F AMNT PAIN NOTED NONE PRSNT: CPT | Performed by: NURSE PRACTITIONER

## 2025-07-21 PROCEDURE — 3044F HG A1C LEVEL LT 7.0%: CPT | Performed by: NURSE PRACTITIONER

## 2025-07-21 PROCEDURE — 99204 OFFICE O/P NEW MOD 45 MIN: CPT | Performed by: NURSE PRACTITIONER

## 2025-07-21 PROCEDURE — G8417 CALC BMI ABV UP PARAM F/U: HCPCS | Performed by: NURSE PRACTITIONER

## 2025-07-21 NOTE — PROGRESS NOTES
[facility] Neurology [address], [address]  [place] [address]  [phone]      No chief complaint on file.      Rhonda Poon is a 80 y.o. female who presents for hospital follow up for amaurosis fugax 2/2 left CC stenosis.     PMH significant for carotid artery stenosis status post carotid endarterectomy in 2017 (bilateral). The patient had an episode of transient vision loss on the left which lasted 5 to 10 minutes in duration and then resolved. Evaluated by Neurology. CT of head negative for acute intracranial abnormalities. CTA of head/neck showed 75% stenosis of left CCA, moderate stenosis of proximal left subclavian artery and moderate stenosis of right vertebral artery. MRI of brain negative for acute infarct, however there are numerous peripheral predominant punctate chronic microhemorrhages, worst in the left occipital lobe, left parietal lobe, and left frontal lobe. Although there is no associated cerebral convexity hemosiderin staining, the peripheral distribution favors cerebral amyloid angiopathy. TTE EF 65-70 % mild LAD and negative for PFO.  Vascular surgery and cardiology consulted. She was evaluated by therapies. Discharged home on DAPT x21 days and leqvio for secondary stroke prevention.    Interval history:     She is here today with her daughter. EARLINE. She is back to her neurological baseline. Denies focal deficits. She remains independent of her ADLS. She has completed her clopidogrel. She is currently on asa and levquio. Tolerating medications without side effects.     She has been seen by Cardiology and vascular surgery. She is not candidate for surgical intervention at present time due to high risk 2/2 CAA. Recommendation to continue medical management.     Denies depression or SI.     Denies tobacco use, alcohol use or recreational drug use.     Denies snoring, daytime somnolence or apnea. No hx of sleep apnea or prior sleep study.      Past Medical History:

## (undated) DEVICE — CATHETER THOR 32FR L23IN PVC 6 EYELET STR ATRAUM

## (undated) DEVICE — BLADE SURG NO20 S STL STR DISP GLASSVAN

## (undated) DEVICE — PREP SKN CHLRAPRP APL 26ML STR --

## (undated) DEVICE — SUT PROL 3-0 36IN SH DA BLU --

## (undated) DEVICE — DISH PTRI STRL --

## (undated) DEVICE — INTENDED TO BE USED TO OCCLUDE, RETRACT AND IDENTIFY ARTERIES, VEINS, TENDONS AND NERVES IN SURGICAL PROCEDURES: Brand: STERION®  VESSEL LOOP

## (undated) DEVICE — SUTURE ETHBND EXCEL SZ 0 L18IN NONABSORBABLE GRN L36MM CT-1 CX21D

## (undated) DEVICE — AMD ANTIMICROBIAL GAUZE SPONGES,12 PLY USP TYPE VII, 0.2% POLYHEXAMETHYLENE BIGUANIDE HCI (PHMB): Brand: CURITY

## (undated) DEVICE — MEDI-TRACE CADENCE ADULT, DEFIBRILLATION ELECTRODE -RTS  (10 PR/PK) - ZOLL: Brand: MEDI-TRACE CADENCE

## (undated) DEVICE — TAPE UMB 1/8X30IN MP COT WHT --

## (undated) DEVICE — INTENDED FOR TISSUE SEPARATION, AND OTHER PROCEDURES THAT REQUIRE A SHARP SURGICAL BLADE TO PUNCTURE OR CUT.: Brand: BARD-PARKER ® STAINLESS STEEL BLADES

## (undated) DEVICE — STERILE HOOK LOCK LATEX FREE ELASTIC BANDAGE 4INX5YD: Brand: HOOK LOCK™

## (undated) DEVICE — SUTURE PROL SZ 6-0 L30IN NONABSORBABLE BLU L13MM CC-1 3/8 M8707

## (undated) DEVICE — AORTIC PUNCHES ARE USED TO CREATE A UNIFORM OPENING IN BLOOD VESSELS DURING CARDIOVASCULAR SURGERY. THE VESSEL GRAFT IS INSERTED INTO THE CREATED OPENING AND SUTURED TO THE VESSEL WALL. AORTIC LANCETS ARE USED TO MAKE A SMALL UNIFORM CUT IN A BLOOD VESSEL TO FACILITATE INSERTION OF AN AORTIC PUNCH.  PUNCHES COME IN VARIOUS LENGTHS, DIAMETERS AND TIP CONFIGURATIONS.: Brand: CLEANCUT ROTATING AORTIC PUNCH

## (undated) DEVICE — Device

## (undated) DEVICE — Device: Brand: JELCO

## (undated) DEVICE — SOLUTION IV 1000ML 0.9% SOD CHL

## (undated) DEVICE — (D)PREP SKN CHLRAPRP APPL 26ML -- CONVERT TO ITEM 371833

## (undated) DEVICE — KENDALL RADIOLUCENT FOAM MONITORING ELECTRODE RECTANGULAR SHAPE: Brand: KENDALL

## (undated) DEVICE — AMD ANTIMICROBIAL BANDAGE ROLL,6 PLY: Brand: KERLIX

## (undated) DEVICE — SUTURE NONABSORBABLE MONOFILAMENT 5-0 C-1 1X24 IN PROLENE 8725H

## (undated) DEVICE — BLADE SCALP SURG BARD-PARK 10 --

## (undated) DEVICE — OXYGENATOR 1.5 M2 SURF AREA 0.5 TO 0.5 LPM FLO RATE W/

## (undated) DEVICE — MAGNETIC DRAPE: Brand: DEVON

## (undated) DEVICE — BASIC SINGLE BASIN-LF: Brand: MEDLINE INDUSTRIES, INC.

## (undated) DEVICE — GLOVE SURG SZ 7 L11.2IN THK9.8MIL STRW LTX POLYMER BEAD CUF

## (undated) DEVICE — CLIP INT SM TI EZ LD LIG SYS WECK HORZ

## (undated) DEVICE — CLIPPING DEVICE: Brand: RESOLUTION CLIP

## (undated) DEVICE — SUTURE VCRL SZ 3-0 L36IN ABSRB UD L36MM CT-1 1/2 CIR J944H

## (undated) DEVICE — INTENDED FOR TISSUE SEPARATION, AND OTHER PROCEDURES THAT REQUIRE A SHARP SURGICAL BLADE TO PUNCTURE OR CUT.: Brand: BARD-PARKER SAFETY BLADES SIZE 15, STERILE

## (undated) DEVICE — AGENT HEMSTAT W4XL4IN OXIDIZED REGENERATED CELOS ABSRB SFT

## (undated) DEVICE — SUTURE PROL SZ 6-0 L30IN NONABSORBABLE BLU L9.3MM BV-1 3/8 M8709

## (undated) DEVICE — WORKING LENGTH 235CM, WORKING CHANNEL 2.8MM: Brand: RESOLUTION 360 CLIP

## (undated) DEVICE — SURGIFOAM SPNG SZ 100

## (undated) DEVICE — LIGHT GLOVE: Brand: DEVON

## (undated) DEVICE — PVC URETHRAL CATHETER: Brand: DOVER

## (undated) DEVICE — APPLIER CLP LIG SM TI PREM SURGCLP SUPER INTLOK 20 DISP

## (undated) DEVICE — SUTURE MCRYL SZ 4-0 L27IN ABSRB UD L19MM PS-2 1/2 CIR PRIM Y426H

## (undated) DEVICE — SUTURE PROL SZ 5-0 L24IN NONABSORBABLE BLU L9.3MM BV-1 3/8 9702H

## (undated) DEVICE — DRAPE TWL SURG 16X26IN BLU ORB04] ALLCARE INC]

## (undated) DEVICE — 2000CC GUARDIAN II: Brand: GUARDIAN

## (undated) DEVICE — GOWN,PREVENTION PLUS,2XL,ST,22/CS: Brand: MEDLINE

## (undated) DEVICE — BLOCK BITE AD 60FR W/ VELC STRP ADDRESSES MOST PT AND

## (undated) DEVICE — APPLIER CLP L9.38IN M LIG TI DISP STR RNG HNDL LIGACLP

## (undated) DEVICE — UNIVERSAL DRAPES: Brand: MEDLINE INDUSTRIES, INC.

## (undated) DEVICE — SUTURE NONABSORBABLE L24IN SZ 7-0 M0-5 BV175-8 EP 24 BLU M8745

## (undated) DEVICE — PERFUSION PACK XCOATING [76320] [TERUMO CARDIOVASCULAR]

## (undated) DEVICE — GAUZE,SPONGE,8"X4",12PLY,XRAY,STRL,LF: Brand: MEDLINE

## (undated) DEVICE — BUTTON SWITCH PENCIL BLADE ELECTRODE, HOLSTER: Brand: EDGE

## (undated) DEVICE — SUTURE PROL SZ 6-0 L24IN NONABSORBABLE BLU BV-1 L9.3MM 3/8 M8805

## (undated) DEVICE — SUTURE PERMAHAND SZ 2-0 L12X18IN NONABSORBABLE BLK SILK A185H

## (undated) DEVICE — SUTURE PROL DBL ARMED 8 0 BV130 5 24IN N ABSRB MFIL BLU M8739

## (undated) DEVICE — 3M™ STERI-STRIP™ REINFORCED ADHESIVE SKIN CLOSURES, R1547, 1/2 IN X 4 IN (12 MM X 100 MM), 6 STRIPS/ENVELOPE: Brand: 3M™ STERI-STRIP™

## (undated) DEVICE — SUTURE NONABSORBABLE MONOFILAMENT 6-0 BV-1 1X30 IN PROLENE 8709H

## (undated) DEVICE — CLAMP INSERT: Brand: STEALTH® CLAMP INSERT

## (undated) DEVICE — APPLIER CLP SM BLK TI AUTO HNDL DISP W/ 20 CLP PREM SURGICLP

## (undated) DEVICE — SUTURE ETHBND EXCEL 2-0 L30IN NONABSORBABLE GRN L26MM SH MX563

## (undated) DEVICE — BLADE SURG NO15 S STL STR DISP GLASSVAN

## (undated) DEVICE — PAD,NON-ADHERENT,3X8,STERILE,LF,1/PK: Brand: MEDLINE

## (undated) DEVICE — GEL US 20GM NONIRRITATING OVERWRAPPED FILE PCH TRNSMIT

## (undated) DEVICE — STERILE HOOK LOCK LATEX FREE ELASTIC BANDAGE 6INX5YD: Brand: HOOK LOCK™

## (undated) DEVICE — BLADE SCALP SURG BARD-PARK 11 --

## (undated) DEVICE — DERMABOND SKIN ADH 0.7ML -- DERMABOND ADVANCED 12/BX

## (undated) DEVICE — 1/4 FORCE SURGICAL SPRING CLIP: Brand: STEALTH® SPRING CLIP

## (undated) DEVICE — SUTURE SILK PERMAHAND PRECUT 6 X 30 IN SZ 1 BLK BRAID A307H

## (undated) DEVICE — (D)SYR 10ML 1/5ML GRAD NSAF -- PKGING CHANGE USE ITEM 338027

## (undated) DEVICE — TRAY CATH 16F URIN MTR LTX -- CONVERT TO ITEM 363111

## (undated) DEVICE — CONNECTOR TBNG OD5-7MM O2 END DISP

## (undated) DEVICE — STANDARD HYPODERMIC NEEDLE,POLYPROPYLENE HUB: Brand: MONOJECT

## (undated) DEVICE — SUTURE VCRL SZ 3-0 L27IN ABSRB UD L26MM SH 1/2 CIR J416H

## (undated) DEVICE — 3000CC GUARDIAN II: Brand: GUARDIAN

## (undated) DEVICE — 48" PROBE COVER W/GEL, ULTRASOUND, STERILE: Brand: SITE-RITE

## (undated) DEVICE — SUTURE VCRL SZ 4-0 L27IN ABSRB UD L19MM PS-2 3/8 CIR PRIM J426H

## (undated) DEVICE — KENDALL SCD EXPRESS SLEEVES, KNEE LENGTH, MEDIUM: Brand: KENDALL SCD

## (undated) DEVICE — SUTURE PERMAHAND SZ 3-0 L18IN NONABSORBABLE BLK SILK BRAID A184H

## (undated) DEVICE — CAROTID ARTERY SHUNT KIT,RADIOPAQUE LINE, STRAIGHT: Brand: ARGYLE

## (undated) DEVICE — SUT PROL 4-0 30IN SH1 DA BLU --

## (undated) DEVICE — 3M™ IOBAN™ 2 ANTIMICROBIAL INCISE DRAPE 6650EZ: Brand: IOBAN™ 2

## (undated) DEVICE — SS SUTURE, 3 PER SLEEVE: Brand: MYO/WIRE II

## (undated) DEVICE — SUTURE PDS II SZ 1 L36IN ABSRB VLT L48MM CTX 1/2 CIR Z371T

## (undated) DEVICE — REM POLYHESIVE ADULT PATIENT RETURN ELECTRODE: Brand: VALLEYLAB

## (undated) DEVICE — DRAPE SLUSH DISC W44XL66IN ST FOR RND BSIN HUSH SLUSH SYS

## (undated) DEVICE — CABG DR WILLIAMS: Brand: MEDLINE INDUSTRIES, INC.

## (undated) DEVICE — SPONGE LAP 18X18IN STRL -- 5/PK

## (undated) DEVICE — SOLUTION IRRIG 3000ML 0.9% SOD CHL FLX CONT 0797208] ICU MEDICAL INC]

## (undated) DEVICE — CANNULA INJ L2.5IN BLNT TIP 3MM CLR BODY W/ 1 W VLV DLP

## (undated) DEVICE — CANNULA NSL ORAL AD FOR CAPNOFLEX CO2 O2 AIRLFE

## (undated) DEVICE — SINGLE BASIN: Brand: CARDINAL HEALTH

## (undated) DEVICE — BLADE SAW W10XL54MM FOR PRI REPEAT STRNOTMY

## (undated) DEVICE — 3M™ IOBAN™ 2 ANTIMICROBIAL INCISE DRAPE 6648EZ: Brand: IOBAN™ 2

## (undated) DEVICE — BLADE OPHTH MINI BEAV SHRP --

## (undated) DEVICE — CATH URETH INTMIT ROB 14FR FUN -- USE ITEM 179521

## (undated) DEVICE — GAUZE,SPONGE,4"X4",16PLY,STRL,LF,10/TRAY: Brand: MEDLINE

## (undated) DEVICE — SYS VSL HARV HEMOPRO2 VASOVIEW -- HARV SYS MINIMUM ORDER 5/EA

## (undated) DEVICE — CONNECTOR IV 3/8X3/8X3/8 Y

## (undated) DEVICE — SUT PROL 6-0 30IN C1 DA BLU --

## (undated) DEVICE — INTENDED FOR TISSUE SEPARATION, AND OTHER PROCEDURES THAT REQUIRE A SHARP SURGICAL BLADE TO PUNCTURE OR CUT.: Brand: BARD-PARKER SAFETY BLADES SIZE 11, STERILE

## (undated) DEVICE — CATHETER THOR 24FR L22IN PVC 5 EYELET STR ATRAUM

## (undated) DEVICE — CANNULA PERF L1.8MM TIP L1MM S STL SHFT BLB SHP TIP FEM

## (undated) DEVICE — SUTURE S STL SZ 6 L18IN NONABSORBABLE SIL L48MM CCS 1/2 CIR M654G

## (undated) DEVICE — CATHETER ETER TY TEMP SENS F MBO W URIN M FOLLOWS CDC GUIDELINES TO